# Patient Record
Sex: FEMALE | Race: WHITE | NOT HISPANIC OR LATINO | Employment: OTHER | URBAN - METROPOLITAN AREA
[De-identification: names, ages, dates, MRNs, and addresses within clinical notes are randomized per-mention and may not be internally consistent; named-entity substitution may affect disease eponyms.]

---

## 2017-01-09 ENCOUNTER — OFFICE VISIT (OUTPATIENT)
Dept: INTERNAL MEDICINE | Facility: CLINIC | Age: 82
End: 2017-01-09

## 2017-01-09 ENCOUNTER — APPOINTMENT (OUTPATIENT)
Dept: WOMENS IMAGING | Facility: HOSPITAL | Age: 82
End: 2017-01-09

## 2017-01-09 VITALS
HEIGHT: 57 IN | BODY MASS INDEX: 21.57 KG/M2 | SYSTOLIC BLOOD PRESSURE: 110 MMHG | HEART RATE: 80 BPM | DIASTOLIC BLOOD PRESSURE: 68 MMHG | OXYGEN SATURATION: 97 % | WEIGHT: 100 LBS | TEMPERATURE: 97.5 F

## 2017-01-09 DIAGNOSIS — J06.9 ACUTE URI: ICD-10-CM

## 2017-01-09 DIAGNOSIS — R04.2 HEMOPTYSIS: Primary | ICD-10-CM

## 2017-01-09 LAB
ALBUMIN SERPL-MCNC: 4.01 G/DL (ref 3.4–4.6)
ALBUMIN/GLOB SERPL: 1.1 G/DL
ALP SERPL-CCNC: 130 U/L (ref 46–116)
ALT SERPL W P-5'-P-CCNC: 16 U/L (ref 14–59)
ANION GAP SERPL CALCULATED.3IONS-SCNC: 9 MMOL/L
AST SERPL-CCNC: 22 U/L (ref 7–37)
BASOPHILS # BLD AUTO: 0.02 10*3/MM3 (ref 0–0.2)
BASOPHILS NFR BLD AUTO: 0.3 % (ref 0–2)
BILIRUB SERPL-MCNC: 0.4 MG/DL (ref 0.2–1)
BUN BLD-MCNC: 23 MG/DL (ref 6–22)
BUN/CREAT SERPL: 15 (ref 7–25)
CALCIUM SPEC-SCNC: 9.2 MG/DL (ref 8.6–10.5)
CHLORIDE SERPL-SCNC: 102 MMOL/L (ref 95–107)
CO2 SERPL-SCNC: 30 MMOL/L (ref 23–32)
CREAT BLD-MCNC: 1.53 MG/DL (ref 0.55–1.02)
DEPRECATED RDW RBC AUTO: 45.9 FL (ref 37–54)
EOSINOPHIL # BLD AUTO: 0.16 10*3/MM3 (ref 0–0.7)
EOSINOPHIL NFR BLD AUTO: 2.4 % (ref 0–5)
ERYTHROCYTE [DISTWIDTH] IN BLOOD BY AUTOMATED COUNT: 13.4 % (ref 11.5–15)
GFR SERPL CREATININE-BSD FRML MDRD: 33 ML/MIN/1.73
GLOBULIN UR ELPH-MCNC: 3.7 GM/DL
GLUCOSE BLD-MCNC: 93 MG/DL (ref 70–100)
HCT VFR BLD AUTO: 46.4 % (ref 34.1–44.9)
HGB BLD-MCNC: 14.6 G/DL (ref 11.2–15.7)
LYMPHOCYTES # BLD AUTO: 1.7 10*3/MM3 (ref 0.8–7)
LYMPHOCYTES NFR BLD AUTO: 26 % (ref 10–60)
MCH RBC QN AUTO: 29.9 PG (ref 26–34)
MCHC RBC AUTO-ENTMCNC: 31.5 G/DL (ref 31–37)
MCV RBC AUTO: 95.1 FL (ref 80–100)
MONOCYTES # BLD AUTO: 0.66 10*3/MM3 (ref 0–1)
MONOCYTES NFR BLD AUTO: 10.1 % (ref 0–13)
NEUTROPHILS # BLD AUTO: 4.01 10*3/MM3 (ref 1–11)
NEUTROPHILS NFR BLD AUTO: 61.2 % (ref 30–85)
PLATELET # BLD AUTO: 184 10*3/MM3 (ref 150–450)
PMV BLD AUTO: 8.9 FL (ref 6–12)
POTASSIUM BLD-SCNC: 4.5 MMOL/L (ref 3.3–5.3)
PROT SERPL-MCNC: 7.7 G/DL (ref 6.3–8.4)
RBC # BLD AUTO: 4.88 10*6/MM3 (ref 3.93–5.22)
SODIUM BLD-SCNC: 141 MMOL/L (ref 136–145)
WBC NRBC COR # BLD: 6.55 10*3/MM3 (ref 5–10)

## 2017-01-09 PROCEDURE — 99214 OFFICE O/P EST MOD 30 MIN: CPT | Performed by: NURSE PRACTITIONER

## 2017-01-09 PROCEDURE — 71020 CHG CHEST X-RAY 2 VW: CPT | Performed by: RADIOLOGY

## 2017-01-09 PROCEDURE — 85025 COMPLETE CBC W/AUTO DIFF WBC: CPT | Performed by: NURSE PRACTITIONER

## 2017-01-09 PROCEDURE — 71020 XR CHEST 2 VW: CPT | Performed by: NURSE PRACTITIONER

## 2017-01-09 PROCEDURE — 80053 COMPREHEN METABOLIC PANEL: CPT | Performed by: NURSE PRACTITIONER

## 2017-01-09 RX ORDER — AZITHROMYCIN 250 MG/1
TABLET, FILM COATED ORAL
Qty: 6 TABLET | Refills: 0 | Status: SHIPPED | OUTPATIENT
Start: 2017-01-09 | End: 2017-04-26

## 2017-01-09 NOTE — MR AVS SNAPSHOT
Marilynn Gil   1/9/2017 11:15 AM   Office Visit    Dept Phone:  [phone]   Encounter #:  [id number]    Provider:  NATIVIDAD Spears   Department:  [facility] INTERNAL MEDICINE                Your Full Care Plan              Today's Medication Changes          These changes are accurate as of: 1/9/17 12:35 PM.  If you have any questions, ask your nurse or doctor.               New Medication(s)Ordered:     azithromycin 250 MG tablet   Commonly known as:  ZITHROMAX Z-HUSAM   Take 2 tablets the first day, then 1 tablet daily for 4 days.   Started by:  NATIVIDAD Spears         Stop taking medication(s)listed here:     sucralfate 1 G tablet   Commonly known as:  CARAFATE   Stopped by:  NATIVIDAD Spears           vitamin D 36887 UNITS capsule capsule   Commonly known as:  ERGOCALCIFEROL   Stopped by:  NATIVIDAD Spears                Where to Get Your Medications      These medications were sent to [facility] [address] [place] [address] [place]  AT [facility]) &  - [phone] [facility] [phone] [address] [address]     Phone:  [phone]     azithromycin 250 MG tablet                  Your Updated Medication List          This list is accurate as of: 1/9/17 12:35 PM.  Always use your most recent med list.                aspirin 325 MG tablet       azithromycin 250 MG tablet   Commonly known as:  ZITHROMAX Z-HUSAM   Take 2 tablets the first day, then 1 tablet daily for 4 days.       diphenoxylate-atropine 2.5-0.025 MG per tablet   Commonly known as:  LOMOTIL       furosemide 20 MG tablet   Commonly known as:  LASIX       isosorbide mononitrate 60 MG 24 hr tablet   Commonly known as:  IMDUR       lisinopril 5 MG tablet   Commonly known as:  PRINIVIL,ZESTRIL       meclizine 25 MG chewable tablet chewable tablet       megestrol 40 MG/ML suspension   Commonly known as:  MEGACE       metoprolol tartrate 25 MG tablet   Commonly known as:  LOPRESSOR       NITROSTAT 0.4 MG SL tablet   Generic drug:  nitroglycerin       ondansetron 4 MG tablet   Commonly known as:  ZOFRAN       pantoprazole 40 MG EC tablet   Commonly known as:  PROTONIX       pentoxifylline 400 MG CR tablet   Commonly known as:  TRENtal       pilocarpine 5 MG tablet   Commonly known as:  SALAGEN       potassium chloride 10 MEQ CR tablet   Commonly known as:  K-DUR,KLOR-CON       prasugrel 5 MG tablet   Commonly known as:  EFFIENT       simvastatin 10 MG tablet   Commonly known as:  ZOCOR       zolpidem 10 MG tablet   Commonly known as:  AMBIEN   TAKE 1/2 TO 1 TABLET BY MOUTH EVERY NIGHT AT BEDTIME               We Performed the Following     XR Chest 2 View       You Were Diagnosed With        Codes Comments    Hemoptysis    -  Primary ICD-10-CM: R04.2  ICD-9-CM: 786.30     Acute URI     ICD-10-CM: J06.9  ICD-9-CM: 465.9       Instructions     None    Patient Instructions History      Upcoming Appointments     Visit Type Date Time Department    OFFICE VISIT 1/9/2017 11:15 AM [employer]    FOLLOW UP 4/26/2017  9:30 AM [employer]     Our records indicate that you have declined [facility] signup. If you would like to sign up for [employer], please email [email or web address] or call [phone] to obtain an activation code.             Other Info from Your Visit           Your Appointments     Apr 26, 2017  9:30 AM EDT   Follow Up with Joycelyn Zapata MD   [facility] (--)    [address] [place] [id number] [phone]           Arrive 15 minutes prior to appointment.              Allergies     Codeine      Other      Darvocet    Shellfish-derived Products      Sulfa Antibiotics        Reason for Visit     URI coughing up blood    Cough           Vital Signs     Blood Pressure Pulse Temperature Height Weight Oxygen Saturation    110/68  "(BP Location: Left arm, Patient Position: Sitting, Cuff Size: Adult) 80 97.5 °F (36.4 °C) (Oral) 57\" (144.8 cm) 100 lb (45.4 kg) 97%    Body Mass Index Smoking Status                21.64 kg/m2 Former Smoker          Problems and Diagnoses Noted     Coughing blood    -  Primary    Acute upper respiratory infection          Results     XR Chest 2 View               "

## 2017-01-10 NOTE — PROGRESS NOTES
Subjective   Marilynn Gil is a 81 y.o. female who presents due to hemoptysis.    HPI Comments: She c/o a mild headache last week with sneezing and nasal itching, sx have since resolved. However, she c/o a cough which has been productive of sputum. She noticed bright red blood Friday evening with coughing which has lingered since then. Denies shortness of breath. She denies abdominal pain, no rectal bleeding. She currently takes Effient and ASA due to hx of venous thrombosis.    URI    This is a new problem. The current episode started in the past 7 days. The problem has been gradually improving. There has been no fever. Associated symptoms include congestion, coughing and rhinorrhea. Pertinent negatives include no abdominal pain, chest pain, dysuria, ear pain, headaches, nausea, sore throat, vomiting or wheezing.   Cough   This is a new problem. The current episode started in the past 7 days. The cough is productive of bloody sputum. Associated symptoms include rhinorrhea. Pertinent negatives include no chest pain, chills, ear pain, fever, headaches, postnasal drip, sore throat or wheezing.        The following portions of the patient's history were reviewed and updated as appropriate: allergies, current medications, past social history and problem list.    Past Medical History   Diagnosis Date   • Anxiety    • CAD in native artery    • Chronic kidney disease, stage 3    • GERD (gastroesophageal reflux disease)    • Hypercholesterolemia    • Hypertension    • Insomnia    • Mesenteric ischemia, chronic    • Osteoporosis    • Pancreatitis    • Venous thrombosis and embolism          Current Outpatient Prescriptions:   •  aspirin 325 MG tablet, Take 325 mg by mouth daily., Disp: , Rfl:   •  diphenoxylate-atropine (LOMOTIL) 2.5-0.025 MG per tablet, Take 1 tablet by mouth. 1-2 tablet every six hours prn diarrhea, Disp: , Rfl:   •  furosemide (LASIX) 20 MG tablet, 1 tablet daily., Disp: , Rfl: 3  •  isosorbide  mononitrate (IMDUR) 60 MG 24 hr tablet, 1 tablet daily., Disp: , Rfl: 3  •  lisinopril (PRINIVIL,ZESTRIL) 5 MG tablet, Take 5 mg by mouth daily., Disp: , Rfl:   •  meclizine 25 MG chewable tablet chewable tablet, Chew 25 mg. 1 tablet every 6-8 hours PRN, Disp: , Rfl:   •  megestrol (MEGACE) 40 MG/ML suspension, TK 10 ML PO QHS, Disp: , Rfl: 3  •  metoprolol tartrate (LOPRESSOR) 25 MG tablet, 1/2 tablet two times daily, Disp: , Rfl: 3  •  NITROSTAT 0.4 MG SL tablet, 1 tab sublingual every 5 minutes x 3 as needed for chest pressure, Disp: , Rfl: 0  •  ondansetron (ZOFRAN) 4 MG tablet, Take 4 mg by mouth. 1 tablet every 4-6 hours as needed for nausea, Disp: , Rfl:   •  pantoprazole (PROTONIX) 40 MG EC tablet, 1 tablet daily., Disp: , Rfl:   •  pentoxifylline (TRENTal) 400 MG CR tablet, 1 tablet 3 (three) times a day., Disp: , Rfl: 3  •  pilocarpine (SALAGEN) 5 MG tablet, TK 1 T PO  TID, Disp: , Rfl: 1  •  potassium chloride (K-DUR,KLOR-CON) 10 MEQ CR tablet, 1 tablet daily., Disp: , Rfl: 4  •  prasugrel (EFFIENT) tablet, Take 5 mg by mouth daily., Disp: , Rfl:   •  simvastatin (ZOCOR) 10 MG tablet, TK 1 T PO D UTD, Disp: , Rfl: 3  •  zolpidem (AMBIEN) 10 MG tablet, TAKE 1/2 TO 1 TABLET BY MOUTH EVERY NIGHT AT BEDTIME, Disp: 90 tablet, Rfl: 0  •  azithromycin (ZITHROMAX Z-HUSAM) 250 MG tablet, Take 2 tablets the first day, then 1 tablet daily for 4 days., Disp: 6 tablet, Rfl: 0    Allergies   Allergen Reactions   • Codeine    • Other      Darvocet   • Shellfish-Derived Products    • Sulfa Antibiotics        Review of Systems   Constitutional: Negative for chills, fatigue, fever and unexpected weight change.   HENT: Positive for congestion and rhinorrhea. Negative for ear pain, postnasal drip, sinus pressure, sore throat and trouble swallowing.    Eyes: Negative for visual disturbance.   Respiratory: Positive for cough. Negative for chest tightness and wheezing.    Cardiovascular: Negative for chest pain, palpitations and  "leg swelling.   Gastrointestinal: Negative for abdominal pain, blood in stool, nausea and vomiting.   Endocrine: Negative for cold intolerance and heat intolerance.   Genitourinary: Negative for dysuria, frequency and urgency.   Musculoskeletal: Negative for arthralgias and joint swelling.   Skin: Negative for color change.   Allergic/Immunologic: Negative for immunocompromised state.   Neurological: Negative for syncope, weakness and headaches.   Hematological: Does not bruise/bleed easily.       Objective   Vitals:    01/09/17 1132   BP: 110/68   BP Location: Left arm   Patient Position: Sitting   Cuff Size: Adult   Pulse: 80   Temp: 97.5 °F (36.4 °C)   TempSrc: Oral   SpO2: 97%   Weight: 100 lb (45.4 kg)   Height: 57\" (144.8 cm)     Physical Exam   Constitutional: She is oriented to person, place, and time. She appears well-developed and well-nourished. No distress.   HENT:   Head: Normocephalic and atraumatic.   Right Ear: External ear normal.   Left Ear: External ear normal.   Eyes: Conjunctivae and EOM are normal. Pupils are equal, round, and reactive to light. No scleral icterus.   Neck: Normal range of motion. Neck supple. No thyromegaly present.   Cardiovascular: Normal rate, regular rhythm, normal heart sounds and intact distal pulses.  Exam reveals no gallop and no friction rub.    No murmur heard.  Pulmonary/Chest: Effort normal and breath sounds normal. No respiratory distress.   Abdominal: Soft. There is no tenderness.   Lymphadenopathy:     She has no cervical adenopathy.   Neurological: She is alert and oriented to person, place, and time.   Skin: Skin is warm and dry. No rash noted. No erythema.   Psychiatric: She has a normal mood and affect. Her behavior is normal.   Nursing note and vitals reviewed.      Assessment/Plan   Marilynn was seen today for uri and cough.    Diagnoses and all orders for this visit:    Hemoptysis  Comments:  no acute abnl on CXR (bronchial thickening present on CXR in " 2014)-will send for review  Orders:  -     XR Chest 2 View  -     CBC Auto Differential; Future  -     Comprehensive Metabolic Panel; Future  -     CBC Auto Differential  -     Comprehensive Metabolic Panel    Acute URI  Comments:  will treat for URI but also send for CT scan to re-evaluate lungs  Orders:  -     azithromycin (ZITHROMAX Z-HUSAM) 250 MG tablet; Take 2 tablets the first day, then 1 tablet daily for 4 days.

## 2017-01-11 ENCOUNTER — TELEPHONE (OUTPATIENT)
Dept: INTERNAL MEDICINE | Facility: CLINIC | Age: 82
End: 2017-01-11

## 2017-01-11 DIAGNOSIS — R04.2 HEMOPTYSIS: Primary | ICD-10-CM

## 2017-01-11 NOTE — TELEPHONE ENCOUNTER
----- Message from Loree Hernandez sent at 1/11/2017  1:39 PM EST -----  Contact: Pt  I called to notify the pt of her CT scan scheduled for 1/12/17 @ 4:30 pm.  Pt was unaware of order, but will try to make appt and understood need for CT.  However, pt is still feeling very poorly and coughing a great deal.  No longer coughing up blood, but is coughing up a lot of phlegm and generally feeling bad.  Pt said no new symptoms, but no better.    Pt would like to speak to Leida if she could please return her call today prior to her CT tomorrow.      Thanks    Pt's # [phone]

## 2017-01-11 NOTE — TELEPHONE ENCOUNTER
----- Message from Meena Philippe MA sent at 1/11/2017 12:23 PM EST -----  Pt calling for lab and xray results.    Pt#[phone]

## 2017-01-12 ENCOUNTER — HOSPITAL ENCOUNTER (OUTPATIENT)
Dept: CT IMAGING | Facility: HOSPITAL | Age: 82
Discharge: HOME | End: 2017-01-12
Admitting: NURSE PRACTITIONER

## 2017-01-12 DIAGNOSIS — R04.2 HEMOPTYSIS: ICD-10-CM

## 2017-01-12 LAB — CREAT BLDA-MCNC: 1.6 MG/DL (ref 0.6–1.3)

## 2017-01-12 PROCEDURE — 71250 CT THORAX DX C-: CPT

## 2017-01-12 PROCEDURE — 82565 ASSAY OF CREATININE: CPT

## 2017-01-12 NOTE — TELEPHONE ENCOUNTER
Discussed with patient and discussed radiologist CXR report. She is no longer coughing up blood but c/o worsening chest congestion and cough. Scheduled CT of chest tomorrow, will follow results.

## 2017-02-28 DIAGNOSIS — G47.00 INSOMNIA: ICD-10-CM

## 2017-03-01 DIAGNOSIS — G47.00 INSOMNIA: ICD-10-CM

## 2017-03-01 RX ORDER — ZOLPIDEM TARTRATE 10 MG/1
TABLET ORAL
Qty: 90 TABLET | Refills: 0 | OUTPATIENT
Start: 2017-03-01 | End: 2017-05-24 | Stop reason: SDUPTHER

## 2017-03-02 ENCOUNTER — TELEPHONE (OUTPATIENT)
Dept: INTERNAL MEDICINE | Facility: CLINIC | Age: 82
End: 2017-03-02

## 2017-03-02 RX ORDER — POTASSIUM CHLORIDE 750 MG/1
TABLET, EXTENDED RELEASE ORAL
Qty: 90 TABLET | Refills: 1 | Status: SHIPPED | OUTPATIENT
Start: 2017-03-02 | End: 2017-08-28 | Stop reason: SDUPTHER

## 2017-03-02 RX ORDER — ZOLPIDEM TARTRATE 10 MG/1
TABLET ORAL
Qty: 90 TABLET | Refills: 0 | OUTPATIENT
Start: 2017-03-02

## 2017-03-02 NOTE — TELEPHONE ENCOUNTER
----- Message from Leida Barajas sent at 3/2/2017  9:25 AM EST -----  Pt calling for a refill on her potassium chloride (K-DUR,KLOR-CON) 10 MEQ CR tablet    Please send to [facility] [id number] [place] [address] [phone] AT [facility]([place]) &  - [phone] [facility] [phone] FX

## 2017-03-02 NOTE — TELEPHONE ENCOUNTER
Refill for potassium requested by patient sent to pharmacy. Receipt of confirmation of receiving the Rx for potassium confirmed by pharmacy

## 2017-04-26 ENCOUNTER — OFFICE VISIT (OUTPATIENT)
Dept: INTERNAL MEDICINE | Facility: CLINIC | Age: 82
End: 2017-04-26

## 2017-04-26 VITALS
BODY MASS INDEX: 21.57 KG/M2 | DIASTOLIC BLOOD PRESSURE: 70 MMHG | SYSTOLIC BLOOD PRESSURE: 114 MMHG | WEIGHT: 100 LBS | HEIGHT: 57 IN | RESPIRATION RATE: 14 BRPM

## 2017-04-26 DIAGNOSIS — G89.29 OTHER CHRONIC BACK PAIN: ICD-10-CM

## 2017-04-26 DIAGNOSIS — R20.2 PARESTHESIA OF RIGHT LEG: ICD-10-CM

## 2017-04-26 DIAGNOSIS — I10 ESSENTIAL HYPERTENSION: Primary | ICD-10-CM

## 2017-04-26 DIAGNOSIS — M54.9 OTHER CHRONIC BACK PAIN: ICD-10-CM

## 2017-04-26 DIAGNOSIS — E78.00 HYPERCHOLESTEROLEMIA: ICD-10-CM

## 2017-04-26 DIAGNOSIS — K86.1 CHRONIC RECURRENT PANCREATITIS (HCC): ICD-10-CM

## 2017-04-26 LAB
ALBUMIN SERPL-MCNC: 4.12 G/DL (ref 3.4–4.6)
ALBUMIN/GLOB SERPL: 1.2 G/DL
ALP SERPL-CCNC: 145 U/L (ref 46–116)
ALT SERPL W P-5'-P-CCNC: 20 U/L (ref 14–59)
AMYLASE SERPL-CCNC: 117 U/L (ref 28–100)
ANION GAP SERPL CALCULATED.3IONS-SCNC: 11 MMOL/L
AST SERPL-CCNC: 26 U/L (ref 7–37)
BILIRUB SERPL-MCNC: 0.4 MG/DL (ref 0.2–1)
BUN BLD-MCNC: 35 MG/DL (ref 6–22)
BUN/CREAT SERPL: 22.2 (ref 7–25)
CALCIUM SPEC-SCNC: 9.4 MG/DL (ref 8.6–10.5)
CHLORIDE SERPL-SCNC: 103 MMOL/L (ref 95–107)
CHOLEST SERPL-MCNC: 255 MG/DL (ref 0–200)
CO2 SERPL-SCNC: 28 MMOL/L (ref 23–32)
CREAT BLD-MCNC: 1.58 MG/DL (ref 0.55–1.02)
GFR SERPL CREATININE-BSD FRML MDRD: 31 ML/MIN/1.73
GLOBULIN UR ELPH-MCNC: 3.4 GM/DL
GLUCOSE BLD-MCNC: 97 MG/DL (ref 70–100)
HDLC SERPL-MCNC: 49 MG/DL (ref 40–81)
LDLC SERPL CALC-MCNC: 146 MG/DL (ref 0–100)
LDLC/HDLC SERPL: 2.98 {RATIO}
LIPASE SERPL-CCNC: 48 U/L (ref 13–60)
POTASSIUM BLD-SCNC: 4.7 MMOL/L (ref 3.3–5.3)
PROT SERPL-MCNC: 7.5 G/DL (ref 6.3–8.4)
SODIUM BLD-SCNC: 142 MMOL/L (ref 136–145)
TRIGL SERPL-MCNC: 299 MG/DL (ref 0–150)
VLDLC SERPL-MCNC: 59.8 MG/DL

## 2017-04-26 PROCEDURE — 99214 OFFICE O/P EST MOD 30 MIN: CPT | Performed by: INTERNAL MEDICINE

## 2017-04-26 PROCEDURE — 80053 COMPREHEN METABOLIC PANEL: CPT | Performed by: INTERNAL MEDICINE

## 2017-04-26 PROCEDURE — 80061 LIPID PANEL: CPT | Performed by: INTERNAL MEDICINE

## 2017-04-26 NOTE — PROGRESS NOTES
Chief Complaint   Patient presents with   • Hypertension     4 month check up    • Hyperlipidemia        Subjective   Marilynn Gil is a 82 y.o. female     HPI: Hypertension:  No management changes were made at her last appointment.     She has not had problems with headaches, visual disturbance.  Recent blood pressures have been controlled.  She has not had associated symptoms of chest pain, syncope, edema, orthopnea, GALINDO, PND.   She is currently taking an ACE-I, BB.  Prudent diet and regular exercise have also been recommended. By report, there is good compliance and good tolerance of medication.     Hyperlipidemia:  No management changes were made at her last appointment. Her most recent lipid panel was done on 12/15/2016  Total cholesterol was 267, Triglycerides 305, HDL 55, .  She is currently taking a statin.  Prudent diet and regular exercise have also been recommended. By report, there is good compliance with treatment and fair tolerance.  She has leg cramps with simvastatin.       Leg numbness is worse - notices it after sitting, has almost fallen. Right leg.  Feels weaker. Foot especially is numb. She had nerve conduction test done last year.  She had flare up of abdominal symptoms recently with loose bowel movements    Dizziness:  Notices this when she gets up from sitting position, feesl faint and dizzy. No spinning. Sometimes feels dizzy when she is walking.     She has noticed some sharp left low back pain at times. This has been going on for several months. No change.     Her gastroenterologist will just be doing hospital work.  Her next appointment will be with a physician assistant.             The following portions of the patient's history were reviewed and updated as appropriate: allergies, current medications, past social history, problem list, past surgical history    Review of Systems   Constitutional: Negative for activity change and appetite change.   HENT: Negative for  "nosebleeds.    Eyes: Negative for visual disturbance.   Respiratory: Negative for cough and shortness of breath.    Cardiovascular: Negative for chest pain, palpitations and leg swelling.   Gastrointestinal: Positive for abdominal pain.   Neurological: Negative for headaches.   Psychiatric/Behavioral: Negative for sleep disturbance.       /70 (BP Location: Right arm, Patient Position: Sitting, Cuff Size: Adult)  Resp 14  Ht 57\" (144.8 cm)  Wt 100 lb (45.4 kg)  BMI 21.64 kg/m2     Objective   Physical Exam   Constitutional: She is oriented to person, place, and time. She appears well-developed and well-nourished. No distress.   Neck: Normal carotid pulses present. Carotid bruit is not present.   Cardiovascular: Normal rate, regular rhythm, S1 normal, S2 normal and intact distal pulses.  Exam reveals no gallop and no friction rub.    No murmur heard.  Pulses:       Carotid pulses are 2+ on the right side, and 2+ on the left side.  Pulmonary/Chest: Effort normal and breath sounds normal. No respiratory distress. She has no wheezes. She has no rhonchi. She has no rales. Chest wall is not dull to percussion.   Musculoskeletal: She exhibits no edema.   Neurological: She is alert and oriented to person, place, and time.   Right leg weaker than left   Skin: Skin is warm and dry.   Nursing note and vitals reviewed.      Assessment/Plan   Problem List Items Addressed This Visit        Cardiovascular and Mediastinum    Hypertension - Primary    Relevant Orders    Comprehensive Metabolic Panel    Lipid Panel    Hypercholesterolemia       Other    Chronic recurrent pancreatitis    Relevant Orders    Amylase    Lipase      Other Visit Diagnoses     Paresthesia of right leg        Relevant Orders    Ambulatory Referral to Physical Therapy Evaluate and treat    Other chronic back pain        Relevant Orders    Ambulatory Referral to Physical Therapy Evaluate and treat        Will try PT for back pain and leg numbness. She " has had several back surgeries in the past.

## 2017-05-04 ENCOUNTER — HOSPITAL ENCOUNTER (OUTPATIENT)
Dept: PHYSICAL THERAPY | Facility: HOSPITAL | Age: 82
Setting detail: THERAPIES SERIES
Discharge: HOME | End: 2017-05-04
Attending: INTERNAL MEDICINE

## 2017-05-04 DIAGNOSIS — R53.1 WEAKNESS GENERALIZED: ICD-10-CM

## 2017-05-04 DIAGNOSIS — R20.0 RIGHT LEG NUMBNESS: Primary | ICD-10-CM

## 2017-05-04 PROCEDURE — G8978 MOBILITY CURRENT STATUS: HCPCS | Performed by: PHYSICAL THERAPIST

## 2017-05-04 PROCEDURE — 97110 THERAPEUTIC EXERCISES: CPT | Performed by: PHYSICAL THERAPIST

## 2017-05-04 PROCEDURE — 97161 PT EVAL LOW COMPLEX 20 MIN: CPT | Performed by: PHYSICAL THERAPIST

## 2017-05-04 PROCEDURE — G8979 MOBILITY GOAL STATUS: HCPCS | Performed by: PHYSICAL THERAPIST

## 2017-05-08 ENCOUNTER — APPOINTMENT (OUTPATIENT)
Dept: PHYSICAL THERAPY | Facility: HOSPITAL | Age: 82
End: 2017-05-08
Attending: INTERNAL MEDICINE

## 2017-05-19 ENCOUNTER — DOCUMENTATION (OUTPATIENT)
Dept: PHYSICAL THERAPY | Facility: HOSPITAL | Age: 82
End: 2017-05-19

## 2017-05-19 DIAGNOSIS — R20.0 RIGHT LEG NUMBNESS: Primary | ICD-10-CM

## 2017-05-19 DIAGNOSIS — R53.1 WEAKNESS GENERALIZED: ICD-10-CM

## 2017-05-19 PROCEDURE — G8979 MOBILITY GOAL STATUS: HCPCS | Performed by: PHYSICAL THERAPIST

## 2017-05-19 PROCEDURE — G8980 MOBILITY D/C STATUS: HCPCS | Performed by: PHYSICAL THERAPIST

## 2017-05-24 DIAGNOSIS — G47.00 INSOMNIA: ICD-10-CM

## 2017-05-24 RX ORDER — ZOLPIDEM TARTRATE 10 MG/1
TABLET ORAL
Qty: 90 TABLET | Refills: 1 | OUTPATIENT
Start: 2017-05-24 | End: 2017-11-19 | Stop reason: SDUPTHER

## 2017-07-07 ENCOUNTER — DOCUMENTATION (OUTPATIENT)
Dept: PHYSICAL THERAPY | Facility: HOSPITAL | Age: 82
End: 2017-07-07

## 2017-08-28 RX ORDER — POTASSIUM CHLORIDE 750 MG/1
TABLET, EXTENDED RELEASE ORAL
Qty: 90 TABLET | Refills: 0 | Status: SHIPPED | OUTPATIENT
Start: 2017-08-28 | End: 2017-11-24 | Stop reason: SDUPTHER

## 2017-09-01 ENCOUNTER — OFFICE VISIT (OUTPATIENT)
Dept: INTERNAL MEDICINE | Facility: CLINIC | Age: 82
End: 2017-09-01

## 2017-09-01 VITALS
BODY MASS INDEX: 21.57 KG/M2 | WEIGHT: 100 LBS | SYSTOLIC BLOOD PRESSURE: 130 MMHG | DIASTOLIC BLOOD PRESSURE: 78 MMHG | HEIGHT: 57 IN

## 2017-09-01 DIAGNOSIS — K86.1 CHRONIC RECURRENT PANCREATITIS (HCC): ICD-10-CM

## 2017-09-01 DIAGNOSIS — I10 ESSENTIAL HYPERTENSION: Primary | ICD-10-CM

## 2017-09-01 DIAGNOSIS — G47.00 INSOMNIA, UNSPECIFIED TYPE: ICD-10-CM

## 2017-09-01 DIAGNOSIS — E78.00 HYPERCHOLESTEROLEMIA: ICD-10-CM

## 2017-09-01 LAB
ALBUMIN SERPL-MCNC: 3.76 G/DL (ref 3.4–4.6)
ALBUMIN/GLOB SERPL: 1 G/DL
ALP SERPL-CCNC: 132 U/L (ref 46–116)
ALT SERPL W P-5'-P-CCNC: 22 U/L (ref 14–59)
AMYLASE SERPL-CCNC: 114 U/L (ref 28–100)
ANION GAP SERPL CALCULATED.3IONS-SCNC: 7 MMOL/L
AST SERPL-CCNC: 24 U/L (ref 7–37)
BILIRUB SERPL-MCNC: 0.4 MG/DL (ref 0.2–1)
BUN BLD-MCNC: 32 MG/DL (ref 6–22)
BUN/CREAT SERPL: 18.8 (ref 7–25)
CALCIUM SPEC-SCNC: 9.5 MG/DL (ref 8.6–10.5)
CHLORIDE SERPL-SCNC: 104 MMOL/L (ref 95–107)
CHOLEST SERPL-MCNC: 224 MG/DL (ref 0–200)
CO2 SERPL-SCNC: 29 MMOL/L (ref 23–32)
CREAT BLD-MCNC: 1.7 MG/DL (ref 0.55–1.02)
GFR SERPL CREATININE-BSD FRML MDRD: 29 ML/MIN/1.73
GLOBULIN UR ELPH-MCNC: 3.9 GM/DL
GLUCOSE BLD-MCNC: 113 MG/DL (ref 70–100)
HDLC SERPL-MCNC: 47 MG/DL (ref 40–81)
LDLC SERPL CALC-MCNC: 133 MG/DL (ref 0–100)
LDLC/HDLC SERPL: 2.83 {RATIO}
LIPASE SERPL-CCNC: 58 U/L (ref 13–60)
POTASSIUM BLD-SCNC: 4.3 MMOL/L (ref 3.3–5.3)
PROT SERPL-MCNC: 7.7 G/DL (ref 6.3–8.4)
SODIUM BLD-SCNC: 140 MMOL/L (ref 136–145)
TRIGL SERPL-MCNC: 221 MG/DL (ref 0–150)
VLDLC SERPL-MCNC: 44.2 MG/DL

## 2017-09-01 PROCEDURE — 36415 COLL VENOUS BLD VENIPUNCTURE: CPT | Performed by: INTERNAL MEDICINE

## 2017-09-01 PROCEDURE — 99213 OFFICE O/P EST LOW 20 MIN: CPT | Performed by: INTERNAL MEDICINE

## 2017-09-01 PROCEDURE — 80061 LIPID PANEL: CPT | Performed by: INTERNAL MEDICINE

## 2017-09-01 PROCEDURE — 80053 COMPREHEN METABOLIC PANEL: CPT | Performed by: INTERNAL MEDICINE

## 2017-09-01 NOTE — PROGRESS NOTES
Chief Complaint   Patient presents with   • Hypertension     4 month follow up   • Hyperlipidemia        Subjective   Marilynn Gil is a 82 y.o. female with history of coronary artery disease, chronic kidney disease, chronic pancreatitis, hypertension and hyperlipidemia.  She has had recent follow-up with her cardiologist.  She would like referral to [facility] gastroenterologist.       HPI: Hypertension: This is a chronic problem.   No management changes were made at her last appointment.       She has not had problems with headaches, visual disturbance, dizziness.  Recent blood pressures have been controlled.    She has chest pain from time to time.   No dizziness.  Current treatment: ACE inhibitor, beta blocker, diuretic  Prudent diet and regular exercise have also been recommended. By report, there is good compliance and good tolerance of medication.  Medical history is significant for coronary artery disease, history of MI, hyperlipidemia.    Hyperlipidemia: Recent lipid panel results reviewed.  Despite statin, she does continue to have high cholesterol.  She has been unable to tolerate high doses of statins.  She suffers from myalgias with high doses.  She is trying to follow a prudent diet.      Chronic pancreatitis:  She has ongoing problems with epigastric discomfort.  This affects her appetite.  She denies nausea or vomiting at this current time.  She has ongoing problems with diarrhea.  She has history of rectal prolapse and has had several repairs.    Insomnia: She does not sleep well.  She takes zolpidem which helps a little bit.      The following portions of the patient's history were reviewed and updated as appropriate: allergies, current medications, past social history, problem list, past surgical history    Review of Systems   Constitutional: Positive for fatigue. Negative for activity change and appetite change (appetite is fair, no change).   HENT: Positive for trouble swallowing. Negative for  "nosebleeds.         Recently had a lesion removed from upper gum on the right.   Eyes: Positive for visual disturbance (has macular degeneration ARED eye vitamin recommended).   Respiratory: Negative for cough and shortness of breath.    Cardiovascular: Positive for chest pain (some, not \"terrible\", has had recent cardilogy follow up) and leg swelling (a little in the ankles sometimes, lasix helps). Negative for palpitations.   Gastrointestinal: Positive for abdominal pain and diarrhea.   Neurological: Negative for headaches.   Psychiatric/Behavioral: Positive for sleep disturbance (does not sleep well.  she takes zolpidem and benadry.).           Objective     /78  Ht 57\" (144.8 cm)  Wt 100 lb (45.4 kg)  BMI 21.64 kg/m2     Physical Exam   Constitutional: She is oriented to person, place, and time. She appears well-developed and well-nourished. No distress.   Neck: Normal carotid pulses present. Carotid bruit is not present.   Cardiovascular: Normal rate, regular rhythm, S1 normal, S2 normal and intact distal pulses.  Exam reveals no gallop and no friction rub.    No murmur heard.  Pulses:       Carotid pulses are 2+ on the right side, and 2+ on the left side.  Pulmonary/Chest: Effort normal and breath sounds normal. No respiratory distress. She has no wheezes. She has no rhonchi. She has no rales. Chest wall is not dull to percussion.   Musculoskeletal: She exhibits no edema.   Neurological: She is alert and oriented to person, place, and time.   Skin: Skin is warm and dry.   Nursing note and vitals reviewed.      Assessment/Plan   Problem List Items Addressed This Visit        Cardiovascular and Mediastinum    Hypertension - Primary    Relevant Orders    Comprehensive Metabolic Panel (Completed)    Lipid Panel (Completed)    Hypercholesterolemia       Other    Chronic recurrent pancreatitis    Relevant Orders    Amylase    Lipase    Ambulatory Referral to Gastroenterology    Insomnia            "

## 2017-10-11 ENCOUNTER — OFFICE VISIT (OUTPATIENT)
Dept: GASTROENTEROLOGY | Facility: CLINIC | Age: 82
End: 2017-10-11

## 2017-10-11 VITALS
HEIGHT: 57 IN | SYSTOLIC BLOOD PRESSURE: 112 MMHG | BODY MASS INDEX: 21.18 KG/M2 | TEMPERATURE: 98.3 F | DIASTOLIC BLOOD PRESSURE: 70 MMHG | WEIGHT: 98.2 LBS

## 2017-10-11 DIAGNOSIS — K21.9 GASTROESOPHAGEAL REFLUX DISEASE, ESOPHAGITIS PRESENCE NOT SPECIFIED: ICD-10-CM

## 2017-10-11 DIAGNOSIS — R13.10 DYSPHAGIA, UNSPECIFIED TYPE: Primary | ICD-10-CM

## 2017-10-11 DIAGNOSIS — K86.1 IDIOPATHIC CHRONIC PANCREATITIS (HCC): ICD-10-CM

## 2017-10-11 PROCEDURE — 99204 OFFICE O/P NEW MOD 45 MIN: CPT | Performed by: INTERNAL MEDICINE

## 2017-10-11 RX ORDER — ONDANSETRON 4 MG/1
4 TABLET, FILM COATED ORAL EVERY 8 HOURS PRN
Qty: 30 TABLET | Refills: 5 | Status: SHIPPED | OUTPATIENT
Start: 2017-10-11 | End: 2021-06-11

## 2017-10-11 RX ORDER — RANITIDINE 300 MG/1
300 TABLET ORAL NIGHTLY
Qty: 30 TABLET | Refills: 5 | Status: SHIPPED | OUTPATIENT
Start: 2017-10-11 | End: 2019-06-14

## 2017-10-11 NOTE — PROGRESS NOTES
Chief Complaint   Patient presents with   • Pancreatitis     Marilynn Gil is a 82 y.o. female who presents with Chronic pancreatitis.  Marilynn had followed with me in the past and then I had referred her on for additional consultation with Dr. Yogesh Boone.  She underwent an ERCP in 2013 with sphincterotomy.  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  She had continued symptoms and intermittent elevations in her amylase and lipase resulting in a second ERCP during a hospital admission in 2014.  At that time she had strictures noted in her distal pancreatic duct.  She was stented at that time.  She had evidence of chronic pancreatitis noted.  She has done better overall but continues to have fairly chronic episodes of epigastric pain.  She has felt better since she's been on pantoprazole once a day.  This was started by Dr. Edgard louise.  She does use Zofran when necessary.  She has had a couple of episodes were she's awoken in middle of the night and vomited.  She's an acid taste in her mouth.  She does not vomit old food.  She does struggle with gas and occasional loose stools.  She was tried on Creon in the past but she didn't really notice the benefit from that.  She has had trouble with her swallowing for some time.  She has a history of prior stroke.  She underwent a video swallow in 2016 at [facility] which showed some mild oropharyngeal dysfunction but a lot of esophageal back flow suggestive of esophageal dysmotility.  She has had esophageal dilation in the past which she did not report afforded her any significant improvement in her symptoms.  She has trouble with both solids and liquids and as a result is having difficulty gaining weight.   HPI  Past Medical History:   Diagnosis Date   • Anxiety    • CAD in native artery    • Chronic kidney disease, stage 3    • GERD (gastroesophageal reflux disease)    • Hypercholesterolemia    • Hypertension    • Insomnia    • Mesenteric ischemia, chronic    • Osteoporosis     • Pancreatitis    • Venous thrombosis and embolism      Past Surgical History:   Procedure Laterality Date   • BACK SURGERY      x 4   • BREAST MASS EXCISION      Benign   • CATARACT EXTRACTION Bilateral    • CHOLECYSTECTOMY  08/2013   • COLONOSCOPY  12/2012    scarred mucosa,internal hemorrhoids   • COLONOSCOPY  09/12/2012    Patent side to side ileo colonic anastomosis, one 7mm polyp in the ascending colon, erythematous and vascular pattern decreased mucosa in the transverse colon, scarred mucosa in the rectum, NBIH.  PATH: Tubular adenoma, Patchy mild chronic inflammation    • CORONARY ANGIOPLASTY      MI 1985, s/p angioplasty 1987   • ENDOSCOPY  12/06/2013    z line irreg 38cm from the incisors, tortuous esophagus, HH, gastritis, bilious gastric fluid, duodenitis.  PATH: Gastric mucosa with minimal chornic infalmmation and with histologic features suggestive of reactive gastropathy.    • PANCREAS SURGERY  05/2013    Pancreatic duct stricture, stent placed   • RECTAL PROLAPSE REPAIR  2007    Rectopexy and sigmoidectomy, s/p rectal prolapse repair x 2 prior to 2007   • SPHINCTEROTOMY  05/2013       Current Outpatient Prescriptions:   •  aspirin 325 MG tablet, Take 325 mg by mouth daily., Disp: , Rfl:   •  diphenoxylate-atropine (LOMOTIL) 2.5-0.025 MG per tablet, Take 1 tablet by mouth. 1-2 tablet every six hours prn diarrhea, Disp: , Rfl:   •  furosemide (LASIX) 20 MG tablet, 1 tablet daily., Disp: , Rfl: 3  •  isosorbide mononitrate (IMDUR) 60 MG 24 hr tablet, 1 tablet daily., Disp: , Rfl: 3  •  lisinopril (PRINIVIL,ZESTRIL) 5 MG tablet, Take 5 mg by mouth daily., Disp: , Rfl:   •  metoprolol tartrate (LOPRESSOR) 25 MG tablet, 1/2 tablet two times daily, Disp: , Rfl: 3  •  pantoprazole (PROTONIX) 40 MG EC tablet, 1 tablet daily., Disp: , Rfl:   •  pentoxifylline (TRENTal) 400 MG CR tablet, 1 tablet 3 (three) times a day., Disp: , Rfl: 3  •  potassium chloride (K-DUR,KLOR-CON) 10 MEQ CR tablet, TAKE 1 TABLET BY  MOUTH DAILY, Disp: 90 tablet, Rfl: 0  •  prasugrel (EFFIENT) tablet, Take 5 mg by mouth daily., Disp: , Rfl:   •  simvastatin (ZOCOR) 10 MG tablet, TK 1 T PO D UTD, Disp: , Rfl: 3  •  zolpidem (AMBIEN) 10 MG tablet, TAKE 1/2 TO 1 TABLET BY MOUTH AT BEDTIME AS NEEDED FOR SLEEP, Disp: 90 tablet, Rfl: 1  •  hydrocortisone (ANUSOL-HC) 2.5 % rectal cream, Apply to externally twice a day as needed, Disp: 1 each, Rfl: 1  •  meclizine 25 MG chewable tablet chewable tablet, Chew 25 mg. 1 tablet every 6-8 hours PRN, Disp: , Rfl:   •  NITROSTAT 0.4 MG SL tablet, 1 tab sublingual every 5 minutes x 3 as needed for chest pressure, Disp: , Rfl: 0  •  ondansetron (ZOFRAN) 4 MG tablet, Take 1 tablet by mouth Every 8 (Eight) Hours As Needed for Nausea or Vomiting. 1 tablet every 4-6 hours as needed for nausea, Disp: 30 tablet, Rfl: 5  •  Pancrelipase, Lip-Prot-Amyl, 19467 units capsule delayed-release particles, Take 1 capsule by mouth Daily With Breakfast, Lunch & Dinner., Disp: 90 capsule, Rfl: 3  •  raNITIdine (ZANTAC) 300 MG tablet, Take 1 tablet by mouth Every Night., Disp: 30 tablet, Rfl: 5  Allergies   Allergen Reactions   • Codeine    • Other      Darvocet   • Shellfish-Derived Products    • Sulfa Antibiotics      Social History     Social History   • Marital status:      Spouse name: N/A   • Number of children: N/A   • Years of education: N/A     Occupational History   • Not on file.     Social History Main Topics   • Smoking status: Former Smoker   • Smokeless tobacco: Not on file   • Alcohol use No   • Drug use: Not on file   • Sexual activity: Not on file     Other Topics Concern   • Not on file     Social History Narrative     Family History   Problem Relation Age of Onset   • Pancreatitis Paternal Aunt      Review of Systems   Constitutional: Positive for appetite change. Negative for unexpected weight change.   Gastrointestinal: Positive for abdominal pain, nausea and vomiting.     Vitals:    10/11/17 1440   BP:  112/70   Temp: 98.3 °F (36.8 °C)     Last Weight    10/11/17  1440   Weight: 98 lb 3.2 oz (44.5 kg)     Physical Exam   Constitutional: She appears well-developed and well-nourished.   HENT:   Head: Normocephalic and atraumatic.   Eyes: No scleral icterus.   Abdominal: Soft. She exhibits no distension and no mass. There is tenderness.   Neurological: She is alert.   Skin: Skin is warm and dry.   Psychiatric: She has a normal mood and affect.     No images are attached to the encounter.  No notes on file  Marilynn was seen today for pancreatitis.    Diagnoses and all orders for this visit:    Dysphagia, unspecified type  -     FL Esophagram Complete; Future    Gastroesophageal reflux disease, esophagitis presence not specified    Idiopathic chronic pancreatitis    Other orders  -     ondansetron (ZOFRAN) 4 MG tablet; Take 1 tablet by mouth Every 8 (Eight) Hours As Needed for Nausea or Vomiting. 1 tablet every 4-6 hours as needed for nausea  -     raNITIdine (ZANTAC) 300 MG tablet; Take 1 tablet by mouth Every Night.  -     Pancrelipase, Lip-Prot-Amyl, 70707 units capsule delayed-release particles; Take 1 capsule by mouth Daily With Breakfast, Lunch & Dinner.    Plan-  Discussed low-fat diet and nutritional supplements  We'll pursue an esophagram for further evaluation of her esophageal dysphagia-I suspect this is more dysmotility than anything  We'll add Zantac at night for further acid suppression.  I'm concerned about her nighttime regurgitation and jessica vomiting and the risk of aspiration.  She should continue pantoprazole the morning if this is afforded her significant symptomatic relief during the day  She had a recent abdominal ultrasound which did not show any significant pancreatic abnormalities.  Obviously this is not the ideal way to image the pancreas however her chronic kidney disease limits our ability to get an updated view of the pancreas without an invasive procedure  Going to restart her on low-dose  pancreatic enzymes

## 2017-10-12 ENCOUNTER — TELEPHONE (OUTPATIENT)
Dept: GASTROENTEROLOGY | Facility: CLINIC | Age: 82
End: 2017-10-12

## 2017-10-12 NOTE — TELEPHONE ENCOUNTER
Check we check with pharmacy to see which pancreatic enzyme replacement would be cheapest for her?

## 2017-10-13 NOTE — TELEPHONE ENCOUNTER
Called pt's pharmacy at [phone] and spoke with pharmacist who did not know of anything to substitute for the creon.      Called the pt and pt advised that she does have some creon 6000 u from 2016 and is asking if she can take this.  Advised pt would send message to Dr Amaro.      Also advised the pt to get on the creon website .  Advised pt that it looks like they do have pt asst program she can enroll.  Pt states she will look this up and call us if she has any problems.      Message sent to Dr Amaro.

## 2017-10-17 NOTE — TELEPHONE ENCOUNTER
Called pt and advised per Dr Amaro that it is ok to use the creon that she has and she recommends signing up for pt assistance. Pt verb understanding.

## 2017-10-23 ENCOUNTER — HOSPITAL ENCOUNTER (OUTPATIENT)
Dept: GENERAL RADIOLOGY | Facility: HOSPITAL | Age: 82
Discharge: HOME | End: 2017-10-23
Attending: INTERNAL MEDICINE | Admitting: INTERNAL MEDICINE

## 2017-10-23 DIAGNOSIS — R13.10 DYSPHAGIA, UNSPECIFIED TYPE: ICD-10-CM

## 2017-10-23 PROCEDURE — 74220 X-RAY XM ESOPHAGUS 1CNTRST: CPT

## 2017-10-26 ENCOUNTER — TELEPHONE (OUTPATIENT)
Dept: GASTROENTEROLOGY | Facility: CLINIC | Age: 82
End: 2017-10-26

## 2017-10-26 DIAGNOSIS — R13.10 DYSPHAGIA, UNSPECIFIED TYPE: Primary | ICD-10-CM

## 2017-10-26 DIAGNOSIS — R19.7 DIARRHEA, UNSPECIFIED TYPE: Primary | ICD-10-CM

## 2017-10-26 NOTE — TELEPHONE ENCOUNTER
----- Message from Garcia Coyle sent at 10/26/2017 11:07 AM EDT -----  Regarding: PT CALLED  Contact: [phone]   PT IS CALLING ABOUT HER ESOPHAGRAM RESULTS  SHE HAD ON Monday ? & ALSO THE MEDICATION CREON, STATED IS NOT WORKING & IS HAVING SOME SIDE AFFECTS FROM IT. PT WOULD LIKE A CALL BACK. THANKS

## 2017-10-26 NOTE — TELEPHONE ENCOUNTER
Spoke with pt - discussed esophagram results.  Need speech eval and maybe therapy.  She had eval at [facility] a few yrs back.  Will get those records and review with speech to see if she needs repeat and if they think they could help her.  Check stool for c diff given changes described.  Stop creon.  Rectal bleeding is chronic from hemorrhoids.  May try xifxan for gas/bloat/loose stools if c diff neg

## 2017-10-26 NOTE — TELEPHONE ENCOUNTER
"Call returned to pt.  States is taking Creon had on hand from DR Boone.  States stomach has \"gone crazy\".  Having 20 soft brown stools/day - states has feeling of urgency all the time and has to strain to pass stool.  Reports bright blood in stool.  Notes a lot of mucous - having to wear pad 2nd incontinence.      States checked on [employer] website as instructed with call of 10/12 - because on Medicare, does not qualify for financial assistance.  Asking what she should do about above symptoms.    Asking for esophagram results - advise that Dr Amaro will review.      Message to DR Amaro.  "

## 2017-10-27 NOTE — TELEPHONE ENCOUNTER
Called [facility] at [phone] and spoke with Meche and requested any swallow studies or esophagram results be faxed to [phone].

## 2017-11-19 DIAGNOSIS — G47.00 INSOMNIA: ICD-10-CM

## 2017-11-21 RX ORDER — ZOLPIDEM TARTRATE 10 MG/1
TABLET ORAL
Qty: 90 TABLET | Refills: 1 | OUTPATIENT
Start: 2017-11-21 | End: 2018-06-14 | Stop reason: SDUPTHER

## 2017-11-27 RX ORDER — POTASSIUM CHLORIDE 750 MG/1
TABLET, EXTENDED RELEASE ORAL
Qty: 90 TABLET | Refills: 3 | Status: SHIPPED | OUTPATIENT
Start: 2017-11-27 | End: 2018-12-26 | Stop reason: SDUPTHER

## 2017-11-30 ENCOUNTER — OFFICE VISIT (OUTPATIENT)
Dept: GASTROENTEROLOGY | Facility: CLINIC | Age: 82
End: 2017-11-30

## 2017-11-30 VITALS
SYSTOLIC BLOOD PRESSURE: 116 MMHG | BODY MASS INDEX: 21.83 KG/M2 | TEMPERATURE: 97.8 F | DIASTOLIC BLOOD PRESSURE: 70 MMHG | WEIGHT: 101.2 LBS | HEIGHT: 57 IN

## 2017-11-30 DIAGNOSIS — R19.7 DIARRHEA, UNSPECIFIED TYPE: Primary | ICD-10-CM

## 2017-11-30 DIAGNOSIS — R13.12 OROPHARYNGEAL DYSPHAGIA: ICD-10-CM

## 2017-11-30 DIAGNOSIS — K86.1 CHRONIC RECURRENT PANCREATITIS (HCC): ICD-10-CM

## 2017-11-30 PROCEDURE — 99213 OFFICE O/P EST LOW 20 MIN: CPT | Performed by: INTERNAL MEDICINE

## 2017-11-30 RX ORDER — DICYCLOMINE HYDROCHLORIDE 10 MG/1
10 CAPSULE ORAL
Qty: 60 CAPSULE | Refills: 2 | Status: SHIPPED | OUTPATIENT
Start: 2017-11-30 | End: 2021-11-23

## 2017-11-30 NOTE — PROGRESS NOTES
Chief Complaint   Patient presents with   • chronic pancreatitis   • Fecal Incontinence   • Black or Bloody Stool   • Abdominal Pain       Marilynn Gil is a  82 y.o. female here for a follow up visit for Diarrhea, fecal incontinence.  Loose stools been a long-standing problem for her.  This seems like it's getting worse.  Yesterday she was in and out of the bathroom all day.  This limits her ability to get out of the house.  She does sometimes see bright red blood on the toilet tissue.  She has known internal hemorrhoids.  She was having discomfort in her abdomen yesterday.  She's had no recent antibiotics and no fever.  She has Lomotil prescribed but she has not used that in a while.  This seems like a progression of her chronic problem.  She seems to be eating better and her weight is up 3 pounds.  We discussed the results of her recent esophagram and she does not wish to pursue any further evaluation of her oropharyngeal swallowing.  She has been noted to have oropharyngeal dysfunction on previous video fluoroscopic swallow study performed at [facility].  She denies melena.She tried a probiotic in the past and this is not afford her any relief.  Since her last visit she tried pancreatic enzymes and this increased her diarrhea.  HPI  Past Medical History:   Diagnosis Date   • Anxiety    • CAD in native artery    • Chronic kidney disease, stage 3    • GERD (gastroesophageal reflux disease)    • Hypercholesterolemia    • Hypertension    • Insomnia    • Mesenteric ischemia, chronic    • Osteoporosis    • Pancreatitis    • Venous thrombosis and embolism      Past Surgical History:   Procedure Laterality Date   • BACK SURGERY      x 4   • BREAST MASS EXCISION      Benign   • CATARACT EXTRACTION Bilateral    • CHOLECYSTECTOMY  08/2013   • COLONOSCOPY  12/2012    scarred mucosa,internal hemorrhoids   • COLONOSCOPY  09/12/2012    Patent side to side ileo colonic anastomosis, one 7mm polyp in the ascending  colon, erythematous and vascular pattern decreased mucosa in the transverse colon, scarred mucosa in the rectum, NBIH.  PATH: Tubular adenoma, Patchy mild chronic inflammation    • CORONARY ANGIOPLASTY      MI 1985, s/p angioplasty 1987   • ENDOSCOPY  12/06/2013    z line irreg 38cm from the incisors, tortuous esophagus, HH, gastritis, bilious gastric fluid, duodenitis.  PATH: Gastric mucosa with minimal chornic infalmmation and with histologic features suggestive of reactive gastropathy.    • PANCREAS SURGERY  05/2013    Pancreatic duct stricture, stent placed   • RECTAL PROLAPSE REPAIR  2007    Rectopexy and sigmoidectomy, s/p rectal prolapse repair x 2 prior to 2007   • SPHINCTEROTOMY  05/2013       Current Outpatient Prescriptions:   •  aspirin 325 MG tablet, Take 325 mg by mouth daily., Disp: , Rfl:   •  diphenoxylate-atropine (LOMOTIL) 2.5-0.025 MG per tablet, Take 1 tablet by mouth. 1-2 tablet every six hours prn diarrhea, Disp: , Rfl:   •  furosemide (LASIX) 20 MG tablet, 1 tablet daily., Disp: , Rfl: 3  •  isosorbide mononitrate (IMDUR) 60 MG 24 hr tablet, 1 tablet daily., Disp: , Rfl: 3  •  lisinopril (PRINIVIL,ZESTRIL) 5 MG tablet, Take 5 mg by mouth daily., Disp: , Rfl:   •  metoprolol tartrate (LOPRESSOR) 25 MG tablet, 1/2 tablet two times daily, Disp: , Rfl: 3  •  ondansetron (ZOFRAN) 4 MG tablet, Take 1 tablet by mouth Every 8 (Eight) Hours As Needed for Nausea or Vomiting. 1 tablet every 4-6 hours as needed for nausea, Disp: 30 tablet, Rfl: 5  •  pantoprazole (PROTONIX) 40 MG EC tablet, 1 tablet daily., Disp: , Rfl:   •  pentoxifylline (TRENTal) 400 MG CR tablet, 1 tablet 3 (three) times a day., Disp: , Rfl: 3  •  potassium chloride (K-DUR,KLOR-CON) 10 MEQ CR tablet, TAKE 1 TABLET BY MOUTH DAILY, Disp: 90 tablet, Rfl: 3  •  prasugrel (EFFIENT) tablet, Take 5 mg by mouth daily., Disp: , Rfl:   •  raNITIdine (ZANTAC) 300 MG tablet, Take 1 tablet by mouth Every Night., Disp: 30 tablet, Rfl: 5  •   simvastatin (ZOCOR) 10 MG tablet, TK 1 T PO D UTD, Disp: , Rfl: 3  •  zolpidem (AMBIEN) 10 MG tablet, TAKE 1/2 TO 1 TABLET BY MOUTH EVERY NIGHT AT BEDTIME AS NEEDED FOR SLEEP, Disp: 90 tablet, Rfl: 1  •  dicyclomine (BENTYL) 10 MG capsule, Take 1 capsule by mouth 4 (Four) Times a Day Before Meals & at Bedtime As Needed (cramping)., Disp: 60 capsule, Rfl: 2  •  hydrocortisone (ANUSOL-HC) 2.5 % rectal cream, Apply to externally twice a day as needed, Disp: 1 each, Rfl: 1  •  meclizine 25 MG chewable tablet chewable tablet, Chew 25 mg. 1 tablet every 6-8 hours PRN, Disp: , Rfl:   •  NITROSTAT 0.4 MG SL tablet, 1 tab sublingual every 5 minutes x 3 as needed for chest pressure, Disp: , Rfl: 0  PRN Meds:.  Allergies   Allergen Reactions   • Codeine    • Other      Darvocet   • Shellfish-Derived Products    • Sulfa Antibiotics      Social History     Social History   • Marital status:      Spouse name: N/A   • Number of children: N/A   • Years of education: N/A     Occupational History   • Not on file.     Social History Main Topics   • Smoking status: Former Smoker     Packs/day: 2.00     Types: Cigarettes     Quit date: 1990   • Smokeless tobacco: Never Used   • Alcohol use No   • Drug use: No   • Sexual activity: Not on file     Other Topics Concern   • Not on file     Social History Narrative     Family History   Problem Relation Age of Onset   • Pancreatitis Paternal Aunt      Review of Systems   Constitutional: Negative for appetite change and unexpected weight change.   Gastrointestinal: Positive for abdominal pain and diarrhea.   Neurological: Positive for tremors.     Vitals:    11/30/17 1204   BP: 116/70   Temp: 97.8 °F (36.6 °C)     Last Weight    11/30/17  1204   Weight: 101 lb 3.2 oz (45.9 kg)     Physical Exam   Constitutional: She appears well-developed and well-nourished.   HENT:   Head: Normocephalic and atraumatic.   Eyes: No scleral icterus.   Pulmonary/Chest: Effort normal.   Abdominal: Soft. She  exhibits no distension.   Neurological: She is alert.   Resting tremor   Skin: Skin is warm and dry.   Psychiatric: She has a normal mood and affect.     No images are attached to the encounter.  Diagnoses and all orders for this visit:    Diarrhea, unspecified type    Chronic recurrent pancreatitis    Oropharyngeal dysphagia    Other orders  -     dicyclomine (BENTYL) 10 MG capsule; Take 1 capsule by mouth 4 (Four) Times a Day Before Meals & at Bedtime As Needed (cramping).      Plan-  Her diarrhea is worse than it has been previously.  No signs of infection.  I think his progression of her usual symptoms.  We'll start her back on scheduled Lomotil 1 in the morning and then she can repeat as needed throughout the day with a maximum of 4 daily.  We discussed pursuing additional studies for her dysphagia and she is deferred additional testing at this time.  She seems to be maintaining her weight and taking an adequate by mouth.  She is going to call me in 2 weeks with a progress report.  She has tried a probiotic in the past with no improvement.  Trial Bentyl low dose when necessary for abdominal discomfort.

## 2017-12-01 ENCOUNTER — TELEPHONE (OUTPATIENT)
Dept: GASTROENTEROLOGY | Facility: CLINIC | Age: 82
End: 2017-12-01

## 2017-12-01 NOTE — TELEPHONE ENCOUNTER
Call received from Nicole @ [facility] ST @ [phone].  States have received ST referral from 11/9.   Therapist is requesting swallow study. See o/v note of 11/30 - pt declining further testing at this time.      VM to Nicole to advise of same - contact office with any questions.

## 2018-01-10 ENCOUNTER — OFFICE VISIT (OUTPATIENT)
Dept: INTERNAL MEDICINE | Facility: CLINIC | Age: 83
End: 2018-01-10

## 2018-01-10 VITALS
DIASTOLIC BLOOD PRESSURE: 60 MMHG | HEIGHT: 57 IN | SYSTOLIC BLOOD PRESSURE: 94 MMHG | WEIGHT: 100 LBS | BODY MASS INDEX: 21.57 KG/M2

## 2018-01-10 DIAGNOSIS — I10 ESSENTIAL HYPERTENSION: ICD-10-CM

## 2018-01-10 DIAGNOSIS — E78.00 HYPERCHOLESTEROLEMIA: ICD-10-CM

## 2018-01-10 DIAGNOSIS — K86.1 CHRONIC RECURRENT PANCREATITIS (HCC): ICD-10-CM

## 2018-01-10 DIAGNOSIS — Z00.00 MEDICARE ANNUAL WELLNESS VISIT, SUBSEQUENT: Primary | ICD-10-CM

## 2018-01-10 LAB
ALBUMIN SERPL-MCNC: 4.2 G/DL (ref 3.5–5.2)
ALBUMIN/GLOB SERPL: 1.1 G/DL
ALP SERPL-CCNC: 143 U/L (ref 39–117)
ALT SERPL W P-5'-P-CCNC: 8 U/L (ref 1–33)
AMYLASE SERPL-CCNC: 120 U/L (ref 28–100)
ANION GAP SERPL CALCULATED.3IONS-SCNC: 6.9 MMOL/L
AST SERPL-CCNC: 18 U/L (ref 1–32)
BILIRUB SERPL-MCNC: 0.4 MG/DL (ref 0.1–1.2)
BUN BLD-MCNC: 26 MG/DL (ref 8–23)
BUN/CREAT SERPL: 17.9 (ref 7–25)
CALCIUM SPEC-SCNC: 9.5 MG/DL (ref 8.6–10.5)
CHLORIDE SERPL-SCNC: 102 MMOL/L (ref 98–107)
CHOLEST SERPL-MCNC: 221 MG/DL (ref 0–200)
CO2 SERPL-SCNC: 33.1 MMOL/L (ref 22–29)
CREAT BLD-MCNC: 1.45 MG/DL (ref 0.57–1)
GFR SERPL CREATININE-BSD FRML MDRD: 35 ML/MIN/1.73
GLOBULIN UR ELPH-MCNC: 3.8 GM/DL
GLUCOSE BLD-MCNC: 104 MG/DL (ref 65–99)
HDLC SERPL-MCNC: 49 MG/DL (ref 40–60)
LDLC SERPL CALC-MCNC: 135 MG/DL (ref 0–100)
LDLC/HDLC SERPL: 2.75 {RATIO}
LIPASE SERPL-CCNC: 66 U/L (ref 13–60)
POTASSIUM BLD-SCNC: 4.5 MMOL/L (ref 3.5–5.2)
PROT SERPL-MCNC: 8 G/DL (ref 6–8.5)
SODIUM BLD-SCNC: 142 MMOL/L (ref 136–145)
TRIGL SERPL-MCNC: 187 MG/DL (ref 0–150)
VLDLC SERPL-MCNC: 37.4 MG/DL (ref 5–40)

## 2018-01-10 PROCEDURE — 99213 OFFICE O/P EST LOW 20 MIN: CPT | Performed by: INTERNAL MEDICINE

## 2018-01-10 PROCEDURE — 90670 PCV13 VACCINE IM: CPT | Performed by: INTERNAL MEDICINE

## 2018-01-10 PROCEDURE — 83690 ASSAY OF LIPASE: CPT | Performed by: INTERNAL MEDICINE

## 2018-01-10 PROCEDURE — 36415 COLL VENOUS BLD VENIPUNCTURE: CPT | Performed by: INTERNAL MEDICINE

## 2018-01-10 PROCEDURE — 80061 LIPID PANEL: CPT | Performed by: INTERNAL MEDICINE

## 2018-01-10 PROCEDURE — 82150 ASSAY OF AMYLASE: CPT | Performed by: INTERNAL MEDICINE

## 2018-01-10 PROCEDURE — 80053 COMPREHEN METABOLIC PANEL: CPT | Performed by: INTERNAL MEDICINE

## 2018-01-10 PROCEDURE — G0009 ADMIN PNEUMOCOCCAL VACCINE: HCPCS | Performed by: INTERNAL MEDICINE

## 2018-01-10 PROCEDURE — G0439 PPPS, SUBSEQ VISIT: HCPCS | Performed by: INTERNAL MEDICINE

## 2018-01-10 NOTE — PROGRESS NOTES
QUICK REFERENCE INFORMATION:  The ABCs of the Annual Wellness Visit    Subsequent Medicare Wellness Visit    HEALTH RISK ASSESSMENT    1935    Recent Hospitalizations:  No hospitalization(s) within the last year..        Current Medical Providers:  Patient Care Team:  Joycelyn Zapata MD as PCP - General  Joycelyn Zapata MD as PCP - Family Medicine  Joycelyn Zapata MD as PCP - Claims Attributed        Smoking Status:  History   Smoking Status   • Former Smoker   • Packs/day: 2.00   • Types: Cigarettes   • Quit date: 1990   Smokeless Tobacco   • Never Used       Alcohol Consumption:  History   Alcohol Use No       Depression Screen:   PHQ-2/PHQ-9 Depression Screening 1/10/2018   Little interest or pleasure in doing things 0   Feeling down, depressed, or hopeless 0   Trouble falling or staying asleep, or sleeping too much 1   Feeling tired or having little energy 1   Poor appetite or overeating 0   Feeling bad about yourself - or that you are a failure or have let yourself or your family down 0   Trouble concentrating on things, such as reading the newspaper or watching television 0   Moving or speaking so slowly that other people could have noticed. Or the opposite - being so fidgety or restless that you have been moving around a lot more than usual 0   Thoughts that you would be better off dead, or of hurting yourself in some way 0   Total Score 2   If you checked off any problems, how difficult have these problems made it for you to do your work, take care of things at home, or get along with other people? Not difficult at all       Health Habits and Functional and Cognitive Screening:  Functional & Cognitive Status 1/10/2018   Do you have difficulty preparing food and eating? No   Do you have difficulty bathing yourself, getting dressed or grooming yourself? No   Do you have difficulty using the toilet? No   Do you have difficulty moving around from place to place? Yes   Do you have trouble with steps or getting out  of a bed or a chair? No   In the past year have you fallen or experienced a near fall? No   Current Diet Low Fat Diet   Dental Exam Up to date   Eye Exam Up to date   Exercise (times per week) 0 times per week   Current Exercise Activities Include None   Do you need help using the phone?  No   Are you deaf or do you have serious difficulty hearing?  No   Do you need help with transportation? No   Do you need help shopping? No   Do you need help preparing meals?  No   Do you need help with housework?  No   Do you need help with laundry? No   Do you need help taking your medications? No   Do you need help managing money? No   Have you felt unusual stress, anger or loneliness in the last month? No   Who do you live with? Alone   If you need help, do you have trouble finding someone available to you? No   Have you been bothered in the last four weeks by sexual problems? No   Do you have difficulty concentrating, remembering or making decisions? No           Does the patient have evidence of cognitive impairment? No    Aspirin use counseling: Taking ASA appropriately as indicated      Recent Lab Results:  CMP:  Lab Results   Component Value Date    GLU 96 03/21/2016    BUN 32 (H) 09/01/2017    CREATININE 1.70 (H) 09/01/2017    EGFRIFNONA 29 (L) 09/01/2017    EGFRIFAFRI 39 (L) 03/21/2016    BCR 18.8 09/01/2017     09/01/2017    K 4.3 09/01/2017    CO2 29.0 09/01/2017    CALCIUM 9.5 09/01/2017    PROTENTOTREF 7.1 03/21/2016    ALBUMIN 3.76 09/01/2017    LABGLOBREF 2.7 03/21/2016    LABIL2 1.0 09/01/2017    BILITOT 0.4 09/01/2017    ALKPHOS 132 (H) 09/01/2017    AST 24 09/01/2017    ALT 22 09/01/2017     Lipid Panel:  Lab Results   Component Value Date    CHOL 224 (H) 09/01/2017    TRIG 221 (H) 09/01/2017    HDL 47 09/01/2017    VLDL 44.2 09/01/2017    LDLCALC 133 (H) 09/01/2017    LDLHDL 2.83 09/01/2017     HbA1c:       Visual Acuity:  No exam data present    Age-appropriate Screening Schedule:  Refer to the list  below for future screening recommendations based on patient's age, sex and/or medical conditions. Orders for these recommended tests are listed in the plan section. The patient has been provided with a written plan.    Health Maintenance   Topic Date Due   • TDAP/TD VACCINES (1 - Tdap) 03/23/1954   • PNEUMOCOCCAL VACCINES (65+ LOW/MEDIUM RISK) (2 of 2 - PPSV23) 09/14/2011   • MAMMOGRAM  03/21/2016   • ZOSTER VACCINE  03/21/2016   • DXA SCAN  08/16/2016   • LIPID PANEL  09/01/2018   • INFLUENZA VACCINE  Completed              Subjective   History of Present Illness    Marilynn Gil is a 82 y.o. female who presents for an Subsequent Wellness Visit.    The following portions of the patient's history were reviewed and updated as appropriate: allergies, current medications, past family history, past medical history, past social history, past surgical history and problem list.    Outpatient Medications Prior to Visit   Medication Sig Dispense Refill   • aspirin 325 MG tablet Take 325 mg by mouth daily.     • dicyclomine (BENTYL) 10 MG capsule Take 1 capsule by mouth 4 (Four) Times a Day Before Meals & at Bedtime As Needed (cramping). 60 capsule 2   • diphenoxylate-atropine (LOMOTIL) 2.5-0.025 MG per tablet Take 1 tablet by mouth. 1-2 tablet every six hours prn diarrhea     • furosemide (LASIX) 20 MG tablet 1 tablet daily.  3   • hydrocortisone (ANUSOL-HC) 2.5 % rectal cream Apply to externally twice a day as needed 1 each 1   • isosorbide mononitrate (IMDUR) 60 MG 24 hr tablet 1 tablet daily.  3   • lisinopril (PRINIVIL,ZESTRIL) 5 MG tablet Take 5 mg by mouth daily.     • meclizine 25 MG chewable tablet chewable tablet Chew 25 mg. 1 tablet every 6-8 hours PRN     • metoprolol tartrate (LOPRESSOR) 25 MG tablet 1/2 tablet two times daily  3   • NITROSTAT 0.4 MG SL tablet 1 tab sublingual every 5 minutes x 3 as needed for chest pressure  0   • ondansetron (ZOFRAN) 4 MG tablet Take 1 tablet by mouth Every 8 (Eight) Hours  "As Needed for Nausea or Vomiting. 1 tablet every 4-6 hours as needed for nausea 30 tablet 5   • pantoprazole (PROTONIX) 40 MG EC tablet 1 tablet daily.     • pentoxifylline (TRENTal) 400 MG CR tablet 1 tablet 3 (three) times a day.  3   • potassium chloride (K-DUR,KLOR-CON) 10 MEQ CR tablet TAKE 1 TABLET BY MOUTH DAILY 90 tablet 3   • prasugrel (EFFIENT) tablet Take 5 mg by mouth daily.     • raNITIdine (ZANTAC) 300 MG tablet Take 1 tablet by mouth Every Night. 30 tablet 5   • simvastatin (ZOCOR) 10 MG tablet TK 1 T PO D UTD  3   • zolpidem (AMBIEN) 10 MG tablet TAKE 1/2 TO 1 TABLET BY MOUTH EVERY NIGHT AT BEDTIME AS NEEDED FOR SLEEP 90 tablet 1     No facility-administered medications prior to visit.        Patient Active Problem List   Diagnosis   • Chronic recurrent pancreatitis   • Hypertension   • Hypercholesterolemia   • CAD in native artery   • Chronic kidney disease, stage 3   • Insomnia   • Dysphagia       Advance Care Planning:  has an advance directive - a copy HAS NOT been provided. Have asked the patient to send this to us to add to record.    Identification of Risk Factors:  Risk factors include: increased fall risk and chronic pain.    Review of Systems    Compared to one year ago, the patient feels her physical health is the same.  Compared to one year ago, the patient feels her mental health is the same.    Objective     Physical Exam    Vitals:    01/10/18 1057   BP: 94/60   Weight: 45.4 kg (100 lb)   Height: 144.8 cm (57\")       Body mass index is 21.64 kg/(m^2).  Discussed the patient's BMI with her. BMI is within normal parameters. No follow-up required.    Assessment/Plan   Patient Self-Management and Personalized Health Advice  The patient has been provided with information about: diet and exercise and preventive services including:   · Pneumococcal vaccine .    Visit Diagnoses:  No diagnosis found.    No orders of the defined types were placed in this encounter.      Outpatient Encounter " Prescriptions as of 1/10/2018   Medication Sig Dispense Refill   • aspirin 325 MG tablet Take 325 mg by mouth daily.     • dicyclomine (BENTYL) 10 MG capsule Take 1 capsule by mouth 4 (Four) Times a Day Before Meals & at Bedtime As Needed (cramping). 60 capsule 2   • diphenoxylate-atropine (LOMOTIL) 2.5-0.025 MG per tablet Take 1 tablet by mouth. 1-2 tablet every six hours prn diarrhea     • furosemide (LASIX) 20 MG tablet 1 tablet daily.  3   • hydrocortisone (ANUSOL-HC) 2.5 % rectal cream Apply to externally twice a day as needed 1 each 1   • isosorbide mononitrate (IMDUR) 60 MG 24 hr tablet 1 tablet daily.  3   • lisinopril (PRINIVIL,ZESTRIL) 5 MG tablet Take 5 mg by mouth daily.     • meclizine 25 MG chewable tablet chewable tablet Chew 25 mg. 1 tablet every 6-8 hours PRN     • metoprolol tartrate (LOPRESSOR) 25 MG tablet 1/2 tablet two times daily  3   • NITROSTAT 0.4 MG SL tablet 1 tab sublingual every 5 minutes x 3 as needed for chest pressure  0   • ondansetron (ZOFRAN) 4 MG tablet Take 1 tablet by mouth Every 8 (Eight) Hours As Needed for Nausea or Vomiting. 1 tablet every 4-6 hours as needed for nausea 30 tablet 5   • pantoprazole (PROTONIX) 40 MG EC tablet 1 tablet daily.     • pentoxifylline (TRENTal) 400 MG CR tablet 1 tablet 3 (three) times a day.  3   • potassium chloride (K-DUR,KLOR-CON) 10 MEQ CR tablet TAKE 1 TABLET BY MOUTH DAILY 90 tablet 3   • prasugrel (EFFIENT) tablet Take 5 mg by mouth daily.     • raNITIdine (ZANTAC) 300 MG tablet Take 1 tablet by mouth Every Night. 30 tablet 5   • simvastatin (ZOCOR) 10 MG tablet TK 1 T PO D UTD  3   • zolpidem (AMBIEN) 10 MG tablet TAKE 1/2 TO 1 TABLET BY MOUTH EVERY NIGHT AT BEDTIME AS NEEDED FOR SLEEP 90 tablet 1     No facility-administered encounter medications on file as of 1/10/2018.        Reviewed use of high risk medication in the elderly: not applicable  Reviewed for potential of harmful drug interactions in the elderly: yes    Follow Up:  No  Follow-up on file.     An After Visit Summary and PPPS with all of these plans were given to the patient.         discussed age-appropriate screening schedule.  She has had the pneumococcal 23 vaccine in the past.  We'll administer Prevnar today.  She declines mammogram, zoster vaccine, bone density,  advised her she could get the tetanus shot at her pharmacy.

## 2018-01-10 NOTE — PATIENT INSTRUCTIONS
Medicare Wellness  Personal Prevention Plan of Service     Date of Office Visit:  01/10/2018  Encounter Provider:  Joycelyn Zapata MD  Place of Service:  [facility]  Patient Name: Marilynn Gil  :  1935    As part of the Medicare Wellness portion of your visit today, we are providing you with this personalized preventive plan of services (PPPS). This plan is based upon recommendations of the [place] Preventive Services Task Force (USPSTF) and the Advisory Committee on Immunization Practices (ACIP).    This lists the preventive care services that should be considered, and provides dates of when you are due. Items listed as completed are up-to-date and do not require any further intervention.    Health Maintenance   Topic Date Due   • TDAP/TD VACCINES (1 - Tdap) 1956   • PNEUMOCOCCAL VACCINES (65+ LOW/MEDIUM RISK) (2 of 2 - PPSV23) 2011   • MEDICARE ANNUAL WELLNESS  2016   • MAMMOGRAM  2016   • ZOSTER VACCINE  2016   • DXA SCAN  2016   • LIPID PANEL  2018   • INFLUENZA VACCINE  Completed       No orders of the defined types were placed in this encounter.      No Follow-up on file.

## 2018-01-10 NOTE — PROGRESS NOTES
Chief Complaint   Patient presents with   • Annual Exam     subsequent wellness exam   • Hypertension   • Hyperlipidemia        Subjective   Marilnyn Gil is a 82 y.o. female     HPI:        Hypertension: This is a chronic problem.   No management changes were made at her last appointment.       She has not had problems with visual disturbance, dizziness.  She has  Headaches at times.  Tylenol helps.  No associated vision changes.  Recent blood pressures have been controlled.    She has not had associated symptoms of chest pain, syncope, edema, orthopnea, GALINDO, PND.     Current treatment: ACE-I, BB  Prudent diet and regular exercise have also been recommended. By report, there is good compliance and good tolerance of medication.     Hyperlipidemia:  This is a chronic problem.   No management changes were made at her last appointment.   Her most recent lipid panel was done on 9/1/2017.  Total cholesterol was 224, Triglycerides 221, HDL 47, .   Current treatment: Statin medication.   Prudent diet and regular exercise have also been recommended. By report, there is good compliance with treatment and good tolerance.    She has not experienced statin associated myalgias, dyspepsia.  She has not had liver enzyme elevation.  Medical history is significant for coronary artery disease.    Chronic pancreatitis:  Symptoms include abdominal pain.  She has recently had gastroenterology evaluation.  Her last amylase done here was slightly elevated.      Coronary artery disease: She has had recent cardiology follow-up.       The following portions of the patient's history were reviewed and updated as appropriate: allergies, current medications, past social history, problem list, past surgical history    Review of Systems   Constitutional: Negative for activity change and appetite change.   HENT: Negative for nosebleeds.    Eyes: Negative for visual disturbance.   Respiratory: Negative for cough and shortness of breath.   "  Cardiovascular: Positive for chest pain (sometimes, NTG helps). Negative for palpitations and leg swelling.   Neurological: Positive for dizziness and headaches. Negative for syncope.   Psychiatric/Behavioral: Negative for sleep disturbance.           Objective     BP 94/60  Ht 144.8 cm (57\")  Wt 45.4 kg (100 lb)  BMI 21.64 kg/m2     Physical Exam   Constitutional: She is oriented to person, place, and time. She appears well-developed and well-nourished. No distress.   Neck: Normal carotid pulses present. Carotid bruit is not present.   Cardiovascular: Normal rate, regular rhythm, S1 normal, S2 normal and intact distal pulses.  Exam reveals no gallop and no friction rub.    No murmur heard.  Pulses:       Carotid pulses are 2+ on the right side, and 2+ on the left side.  Pulmonary/Chest: Effort normal and breath sounds normal. No respiratory distress. She has no wheezes. She has no rhonchi. She has no rales. Chest wall is not dull to percussion.   Musculoskeletal: She exhibits no edema.   Neurological: She is alert and oriented to person, place, and time.   Skin: Skin is warm and dry.   Nursing note and vitals reviewed.      Assessment/Plan   Problem List Items Addressed This Visit        Cardiovascular and Mediastinum    Hypertension    Relevant Orders    Comprehensive Metabolic Panel    Lipid Panel    Hypercholesterolemia       Other    Chronic recurrent pancreatitis    Relevant Orders    Amylase    Lipase      Other Visit Diagnoses     Medicare annual wellness visit, subsequent    -  Primary            "

## 2018-01-16 ENCOUNTER — TREATMENT (OUTPATIENT)
Dept: GASTROENTEROLOGY | Facility: CLINIC | Age: 83
End: 2018-01-16

## 2018-01-17 ENCOUNTER — OFFICE VISIT (OUTPATIENT)
Dept: GASTROENTEROLOGY | Facility: CLINIC | Age: 83
End: 2018-01-17

## 2018-01-17 VITALS
BODY MASS INDEX: 22.27 KG/M2 | WEIGHT: 99 LBS | DIASTOLIC BLOOD PRESSURE: 68 MMHG | HEIGHT: 56 IN | SYSTOLIC BLOOD PRESSURE: 118 MMHG

## 2018-01-17 DIAGNOSIS — K90.9 DIARRHEA DUE TO MALABSORPTION: ICD-10-CM

## 2018-01-17 DIAGNOSIS — R13.12 OROPHARYNGEAL DYSPHAGIA: Primary | ICD-10-CM

## 2018-01-17 DIAGNOSIS — R10.13 EPIGASTRIC PAIN: ICD-10-CM

## 2018-01-17 DIAGNOSIS — R19.7 DIARRHEA DUE TO MALABSORPTION: ICD-10-CM

## 2018-01-17 PROCEDURE — 99213 OFFICE O/P EST LOW 20 MIN: CPT | Performed by: INTERNAL MEDICINE

## 2018-01-17 NOTE — PROGRESS NOTES
Chief Complaint   Patient presents with   • Follow-up       Marilynn Gil is a  82 y.o. female here for a follow up visit for oropharyngeal dysphasia, chronic pancreatitis and diarrhea.    The diarrhea persists - she takes bentyl 2 times daily.  She thinks this helps a little but still having issues with control.  She is not using lomotil regularly.    Her swallowing continues to be challenging.  She has had a recent negative esophagram and previous [facility]SS at [facility] after her CVA.  She does not wish to repeat this at this time but it showed oropharyngeal dysphagia.  She avoids meats and eats a modified diet.    She complains of midepigastric pain.  Recent mildly elevated panc enzymes.  She did not tolerate pancreatic enzyme replacement as it increased her diarrhea.  She does think the nausea is better and gerd at night improved with zantac.  HPI  Past Medical History:   Diagnosis Date   • Anxiety    • CAD in native artery    • Chronic kidney disease, stage 3    • GERD (gastroesophageal reflux disease)    • Hypercholesterolemia    • Hypertension    • Insomnia    • Mesenteric ischemia, chronic    • Osteoporosis    • Pancreatitis    • Venous thrombosis and embolism      Past Surgical History:   Procedure Laterality Date   • BACK SURGERY      x 4   • BREAST MASS EXCISION      Benign   • CATARACT EXTRACTION Bilateral    • CHOLECYSTECTOMY  08/2013   • COLONOSCOPY  12/2012    scarred mucosa,internal hemorrhoids   • COLONOSCOPY  09/12/2012    Patent side to side ileo colonic anastomosis, one 7mm polyp in the ascending colon, erythematous and vascular pattern decreased mucosa in the transverse colon, scarred mucosa in the rectum, NBIH.  PATH: Tubular adenoma, Patchy mild chronic inflammation    • CORONARY ANGIOPLASTY      MI 1985, s/p angioplasty 1987   • ENDOSCOPY  12/06/2013    z line irreg 38cm from the incisors, tortuous esophagus, HH, gastritis, bilious gastric fluid, duodenitis.  PATH: Gastric mucosa with minimal  chornic infalmmation and with histologic features suggestive of reactive gastropathy.    • PANCREAS SURGERY  05/2013    Pancreatic duct stricture, stent placed   • RECTAL PROLAPSE REPAIR  2007    Rectopexy and sigmoidectomy, s/p rectal prolapse repair x 2 prior to 2007   • SPHINCTEROTOMY  05/2013       Current Outpatient Prescriptions:   •  aspirin 325 MG tablet, Take 325 mg by mouth daily., Disp: , Rfl:   •  dicyclomine (BENTYL) 10 MG capsule, Take 1 capsule by mouth 4 (Four) Times a Day Before Meals & at Bedtime As Needed (cramping)., Disp: 60 capsule, Rfl: 2  •  diphenoxylate-atropine (LOMOTIL) 2.5-0.025 MG per tablet, Take 1 tablet by mouth. 1-2 tablet every six hours prn diarrhea, Disp: , Rfl:   •  furosemide (LASIX) 20 MG tablet, 1 tablet daily., Disp: , Rfl: 3  •  hydrocortisone (ANUSOL-HC) 2.5 % rectal cream, Apply to externally twice a day as needed, Disp: 1 each, Rfl: 1  •  isosorbide mononitrate (IMDUR) 60 MG 24 hr tablet, 1 tablet daily., Disp: , Rfl: 3  •  lisinopril (PRINIVIL,ZESTRIL) 5 MG tablet, Take 5 mg by mouth daily., Disp: , Rfl:   •  meclizine 25 MG chewable tablet chewable tablet, Chew 25 mg. 1 tablet every 6-8 hours PRN, Disp: , Rfl:   •  metoprolol tartrate (LOPRESSOR) 25 MG tablet, 1/2 tablet two times daily, Disp: , Rfl: 3  •  NITROSTAT 0.4 MG SL tablet, 1 tab sublingual every 5 minutes x 3 as needed for chest pressure, Disp: , Rfl: 0  •  ondansetron (ZOFRAN) 4 MG tablet, Take 1 tablet by mouth Every 8 (Eight) Hours As Needed for Nausea or Vomiting. 1 tablet every 4-6 hours as needed for nausea, Disp: 30 tablet, Rfl: 5  •  pantoprazole (PROTONIX) 40 MG EC tablet, 1 tablet daily., Disp: , Rfl:   •  pentoxifylline (TRENTal) 400 MG CR tablet, 1 tablet 3 (three) times a day., Disp: , Rfl: 3  •  potassium chloride (K-DUR,KLOR-CON) 10 MEQ CR tablet, TAKE 1 TABLET BY MOUTH DAILY, Disp: 90 tablet, Rfl: 3  •  prasugrel (EFFIENT) tablet, Take 5 mg by mouth daily., Disp: , Rfl:   •  raNITIdine  (ZANTAC) 300 MG tablet, Take 1 tablet by mouth Every Night., Disp: 30 tablet, Rfl: 5  •  simvastatin (ZOCOR) 10 MG tablet, TK 1 T PO D UTD, Disp: , Rfl: 3  •  zolpidem (AMBIEN) 10 MG tablet, TAKE 1/2 TO 1 TABLET BY MOUTH EVERY NIGHT AT BEDTIME AS NEEDED FOR SLEEP, Disp: 90 tablet, Rfl: 1  PRN Meds:.  Allergies   Allergen Reactions   • Codeine    • Other      Darvocet   • Shellfish-Derived Products    • Sulfa Antibiotics      Social History     Social History   • Marital status:      Spouse name: N/A   • Number of children: N/A   • Years of education: N/A     Occupational History   • Not on file.     Social History Main Topics   • Smoking status: Former Smoker     Packs/day: 2.00     Types: Cigarettes     Quit date: 1990   • Smokeless tobacco: Never Used   • Alcohol use No   • Drug use: No   • Sexual activity: Not on file     Other Topics Concern   • Not on file     Social History Narrative     Family History   Problem Relation Age of Onset   • Pancreatitis Paternal Aunt      Review of Systems   Constitutional: Positive for appetite change. Negative for unexpected weight change.   Gastrointestinal: Positive for abdominal distention, abdominal pain, diarrhea and nausea.   Neurological: Positive for tremors and weakness.     Vitals:    01/17/18 1329   BP: 118/68     Last Weight    01/17/18  1329   Weight: 44.9 kg (99 lb)     Physical Exam   Constitutional: She appears well-developed and well-nourished.   HENT:   Head: Normocephalic and atraumatic.   Eyes: No scleral icterus.   Abdominal: Soft. She exhibits no distension and no mass. There is tenderness.       Neurological: She is alert.   Skin: Skin is warm and dry.   Psychiatric: She has a normal mood and affect.     No images are attached to the encounter.  Diagnoses and all orders for this visit:    Oropharyngeal dysphagia    Diarrhea due to malabsorption    Epigastric pain         Plan:  - schedule lomotil in the morning and can take second dose in am or  later if not significantly improved and still can take up to 4 total prn  - decrease bentyl - she thinks it may be making her stomach hurt  - increase po - try alternative supplements such as glucerna and ensure clear as the others made her vomit  - continued modification of diet given swallowing difficulties

## 2018-02-21 ENCOUNTER — OFFICE VISIT (OUTPATIENT)
Dept: INTERNAL MEDICINE | Facility: CLINIC | Age: 83
End: 2018-02-21

## 2018-02-21 VITALS
SYSTOLIC BLOOD PRESSURE: 132 MMHG | HEIGHT: 56 IN | DIASTOLIC BLOOD PRESSURE: 70 MMHG | WEIGHT: 100 LBS | BODY MASS INDEX: 22.5 KG/M2

## 2018-02-21 DIAGNOSIS — G60.9 IDIOPATHIC PERIPHERAL NEUROPATHY: Primary | ICD-10-CM

## 2018-02-21 LAB
ERYTHROCYTE [SEDIMENTATION RATE] IN BLOOD: 28 MM/HR (ref 0–30)
FOLATE SERPL-MCNC: 19.94 NG/ML (ref 4.78–24.2)
TSH SERPL DL<=0.05 MIU/L-ACNC: 1.11 MIU/ML (ref 0.27–4.2)
VIT B12 BLD-MCNC: 411 PG/ML (ref 211–946)

## 2018-02-21 PROCEDURE — 85652 RBC SED RATE AUTOMATED: CPT | Performed by: INTERNAL MEDICINE

## 2018-02-21 PROCEDURE — 82607 VITAMIN B-12: CPT | Performed by: INTERNAL MEDICINE

## 2018-02-21 PROCEDURE — 99214 OFFICE O/P EST MOD 30 MIN: CPT | Performed by: INTERNAL MEDICINE

## 2018-02-21 PROCEDURE — 84443 ASSAY THYROID STIM HORMONE: CPT | Performed by: INTERNAL MEDICINE

## 2018-02-21 PROCEDURE — 82746 ASSAY OF FOLIC ACID SERUM: CPT | Performed by: INTERNAL MEDICINE

## 2018-02-21 RX ORDER — GABAPENTIN 100 MG/1
100 CAPSULE ORAL NIGHTLY
Qty: 30 CAPSULE | Refills: 3 | Status: SHIPPED | OUTPATIENT
Start: 2018-02-21 | End: 2018-03-19 | Stop reason: ALTCHOICE

## 2018-02-21 NOTE — PROGRESS NOTES
"Chief Complaint   Patient presents with   • Tingling     tingling and burning in hands, redness        Subjective   Marilynn Gil is a 82 y.o. female     HPI: She comes in for evaluation of tingling and burning of her hands and feet.  This is a new problem This has been going on for a month.  It is persistent, it happens daily and at night, it seems to be getting worse.  It is worse at night.  At night, she has burning in the palms of her hands.  She has to hold onto a glass of ice water to help it.  During the day, she has tingling of her palms of her hands and soles of her feet.  If she is asleep, she'll wake up with burning in her palms of her hands.  She has noticed problems grasping things and sometimes drops things.        The following portions of the patient's history were reviewed and updated as appropriate: allergies, current medications, past social history, problem list, past surgical history    Review of Systems   Constitutional: Positive for fatigue. Negative for appetite change, chills and fever.   Respiratory: Positive for shortness of breath (no change). Negative for cough and wheezing.    Cardiovascular: Positive for chest pain (sometimes). Negative for palpitations and leg swelling.   Endocrine: Positive for cold intolerance (a little). Negative for heat intolerance.   Musculoskeletal: Negative for arthralgias.   Skin: Negative for rash (the palms of her hands look red when they burn but otherwise she has no skin changes.).   Neurological: Positive for dizziness. Negative for weakness.           Objective     /70  Ht 142.2 cm (56\")  Wt 45.4 kg (100 lb)  BMI 22.42 kg/m2     Physical Exam   Constitutional: She appears well-developed and well-nourished. No distress.   Eyes: Conjunctivae are normal.   Neck: Neck supple.   Cardiovascular: Normal rate and regular rhythm.  Exam reveals no gallop and no friction rub.    No murmur heard.  Pulses:       Radial pulses are 2+ on the right side, " and 2+ on the left side.        Dorsalis pedis pulses are 1+ on the right side, and 1+ on the left side.   Pulmonary/Chest: Effort normal and breath sounds normal. She has no wheezes. She has no rhonchi. She has no rales.   Musculoskeletal: Normal range of motion. She exhibits no edema or tenderness.   Neurological:   Some decreased sensitivity to light touch on the tips of her fingers bilaterally.   Nursing note and vitals reviewed.      Assessment/Plan   Problem List Items Addressed This Visit     None      Visit Diagnoses     Idiopathic peripheral neuropathy    -  Primary    Relevant Orders    TSH Rfx On Abnormal To Free T4    Vitamin B12 & Folate    ZINA With / DsDNA, RNP, Sjogrens A / B, Smith    Sedimentation Rate

## 2018-02-22 LAB — ANA SER QL: NEGATIVE

## 2018-02-27 ENCOUNTER — TELEPHONE (OUTPATIENT)
Dept: INTERNAL MEDICINE | Facility: CLINIC | Age: 83
End: 2018-02-27

## 2018-02-27 DIAGNOSIS — R20.2 PARESTHESIA: Primary | ICD-10-CM

## 2018-02-27 NOTE — TELEPHONE ENCOUNTER
I put in referral to a neurologist.  I recommend she come to the acute clinic tomorrow to further evaluate the cold feeling. OK to stop neurontin

## 2018-02-27 NOTE — TELEPHONE ENCOUNTER
"----- Message from Liz KADE Guerra sent at 2/27/2018  3:21 PM EST -----  Contact: pt - Dr Zapata' pt - RE: Referral, new Rx, Appt  Pt calling and would like to inform that Gabapentin is not helping, pt would like to know if Rx can be discontinued, as it makes pt dizzy - like a \"hang-over\". Pt would like to informs soul like referral for neurologist consult. Pt informs that pt is staying cold, especially on (L) side - like ice, using 4 blankets, wearing long shirts & pants. Pt was given results of lab. Could you please call pt to discuss? Please advise. Thanks        Pt # [phone]  "

## 2018-02-27 NOTE — TELEPHONE ENCOUNTER
Ms Gil states its a referral to a neurologist for her hands and feet. Also is concerned about the coldness she has, last night was really bad

## 2018-03-14 ENCOUNTER — TELEPHONE (OUTPATIENT)
Dept: GASTROENTEROLOGY | Facility: CLINIC | Age: 83
End: 2018-03-14

## 2018-03-14 RX ORDER — PANTOPRAZOLE SODIUM 40 MG/1
40 TABLET, DELAYED RELEASE ORAL DAILY
Qty: 90 TABLET | Refills: 3 | Status: SHIPPED | OUTPATIENT
Start: 2018-03-14 | End: 2019-06-14 | Stop reason: SDUPTHER

## 2018-03-14 NOTE — TELEPHONE ENCOUNTER
----- Message from Sheila Zhao sent at 3/14/2018 11:56 AM EDT -----  Regarding: PT CALLED - SCRIPT FOR PANTOPRAZOLE 40MG QD  Contact: [phone]  PT STATED DR CONRAD HAD AGREED TO FILL SCRIPT FOR HER. PHARM INFO IS RUT. PT ASK FOR 90 DAY SCRIPT WITH REFILL.

## 2018-03-19 ENCOUNTER — OFFICE VISIT (OUTPATIENT)
Dept: NEUROLOGY | Facility: CLINIC | Age: 83
End: 2018-03-19

## 2018-03-19 VITALS
BODY MASS INDEX: 21.57 KG/M2 | WEIGHT: 100 LBS | SYSTOLIC BLOOD PRESSURE: 119 MMHG | DIASTOLIC BLOOD PRESSURE: 60 MMHG | HEIGHT: 57 IN

## 2018-03-19 DIAGNOSIS — G25.0 ESSENTIAL TREMOR: Primary | ICD-10-CM

## 2018-03-19 DIAGNOSIS — G60.0 HEREDITARY MOTOR AND SENSORY NEUROPATHY: ICD-10-CM

## 2018-03-19 DIAGNOSIS — G62.9 POLYNEUROPATHY: ICD-10-CM

## 2018-03-19 PROCEDURE — 99214 OFFICE O/P EST MOD 30 MIN: CPT | Performed by: PSYCHIATRY & NEUROLOGY

## 2018-03-19 RX ORDER — DULOXETIN HYDROCHLORIDE 30 MG/1
30 CAPSULE, DELAYED RELEASE ORAL NIGHTLY
Qty: 30 CAPSULE | Refills: 5 | Status: SHIPPED | OUTPATIENT
Start: 2018-03-19 | End: 2018-06-21

## 2018-03-19 NOTE — PROGRESS NOTES
Subjective:     Patient ID: Marilynn Gil is a 82 y.o. female.    History of Present Illness     Patient is an 83-year-old right-handed woman who was seen for further evaluation of numbness and tingling in her hands and feet.  She's had this for several months but his been worse over the past 6 weeks.  Her palms are read as are the soles of her feet.  She will wake up with this is mainly at nighttime.  This is the first time I'm seeing this patient as a patient.  I did perform a test on the patient 2 years ago which was EMG and nerve conduction study of the right lower extremity which was normal.  She has never been through chemotherapy.  She is not diabetic.  She is not on any new medication.  I reviewed her medicines.  She was tried on gabapentin which she could not tolerate.  She also has a tremor which is in her voice head and hands.  This is been present for a few years.  He is on metoprolol.  The following portions of the patient's history were reviewed and updated as appropriate: allergies, current medications, past family history, past medical history, past social history, past surgical history and problem list.    Family History   Problem Relation Age of Onset   • Cancer Brother    • Pancreatitis Paternal Aunt    • Diabetes Father      Active Ambulatory Problems     Diagnosis Date Noted   • Chronic recurrent pancreatitis    • Hypertension    • Hypercholesterolemia    • CAD in native artery    • Chronic kidney disease, stage 3    • Insomnia 09/01/2017   • Dysphagia 10/11/2017   • Polyneuropathy 03/19/2018   • Essential tremor 03/19/2018     Resolved Ambulatory Problems     Diagnosis Date Noted   • No Resolved Ambulatory Problems     Past Medical History:   Diagnosis Date   • Anxiety    • CAD in native artery    • Chronic kidney disease, stage 3    • GERD (gastroesophageal reflux disease)    • Hypercholesterolemia    • Hypertension    • Insomnia    • Mesenteric ischemia, chronic    • Osteoporosis    •  Pancreatitis    • Stroke    • Venous thrombosis and embolism      Social History     Social History   • Marital status:      Spouse name: N/A   • Number of children: N/A   • Years of education: N/A     Occupational History   • Not on file.     Social History Main Topics   • Smoking status: Former Smoker     Packs/day: 2.00     Types: Cigarettes     Quit date: 1990   • Smokeless tobacco: Never Used   • Alcohol use No   • Drug use: No   • Sexual activity: Not on file     Other Topics Concern   • Not on file     Social History Narrative   • No narrative on file       Current Outpatient Prescriptions:   •  aspirin 325 MG tablet, Take 325 mg by mouth daily., Disp: , Rfl:   •  dicyclomine (BENTYL) 10 MG capsule, Take 1 capsule by mouth 4 (Four) Times a Day Before Meals & at Bedtime As Needed (cramping)., Disp: 60 capsule, Rfl: 2  •  diphenoxylate-atropine (LOMOTIL) 2.5-0.025 MG per tablet, Take 1 tablet by mouth. 1-2 tablet every six hours prn diarrhea, Disp: , Rfl:   •  furosemide (LASIX) 20 MG tablet, 1 tablet daily., Disp: , Rfl: 3  •  hydrocortisone (ANUSOL-HC) 2.5 % rectal cream, Apply to externally twice a day as needed, Disp: 1 each, Rfl: 1  •  isosorbide mononitrate (IMDUR) 60 MG 24 hr tablet, 1 tablet daily., Disp: , Rfl: 3  •  lisinopril (PRINIVIL,ZESTRIL) 5 MG tablet, Take 5 mg by mouth daily., Disp: , Rfl:   •  metoprolol tartrate (LOPRESSOR) 25 MG tablet, 1/2 tablet two times daily, Disp: , Rfl: 3  •  NITROSTAT 0.4 MG SL tablet, 1 tab sublingual every 5 minutes x 3 as needed for chest pressure, Disp: , Rfl: 0  •  ondansetron (ZOFRAN) 4 MG tablet, Take 1 tablet by mouth Every 8 (Eight) Hours As Needed for Nausea or Vomiting. 1 tablet every 4-6 hours as needed for nausea, Disp: 30 tablet, Rfl: 5  •  pantoprazole (PROTONIX) 40 MG EC tablet, Take 1 tablet by mouth Daily., Disp: 90 tablet, Rfl: 3  •  pentoxifylline (TRENTal) 400 MG CR tablet, 1 tablet 3 (three) times a day., Disp: , Rfl: 3  •  potassium  chloride (K-DUR,KLOR-CON) 10 MEQ CR tablet, TAKE 1 TABLET BY MOUTH DAILY, Disp: 90 tablet, Rfl: 3  •  prasugrel (EFFIENT) tablet, Take 5 mg by mouth daily., Disp: , Rfl:   •  raNITIdine (ZANTAC) 300 MG tablet, Take 1 tablet by mouth Every Night., Disp: 30 tablet, Rfl: 5  •  simvastatin (ZOCOR) 10 MG tablet, TK 1 T PO D UTD, Disp: , Rfl: 3  •  zolpidem (AMBIEN) 10 MG tablet, TAKE 1/2 TO 1 TABLET BY MOUTH EVERY NIGHT AT BEDTIME AS NEEDED FOR SLEEP, Disp: 90 tablet, Rfl: 1  •  DULoxetine (CYMBALTA) 30 MG capsule, Take 1 capsule by mouth Every Night., Disp: 30 capsule, Rfl: 5  •  meclizine 25 MG chewable tablet chewable tablet, Chew 25 mg. 1 tablet every 6-8 hours PRN, Disp: , Rfl:     Review of Systems   Constitutional: Positive for diaphoresis and fatigue. Negative for activity change, appetite change, chills, fever and unexpected weight change.   HENT: Positive for trouble swallowing. Negative for congestion, dental problem, drooling, ear discharge, ear pain, facial swelling, hearing loss, mouth sores, nosebleeds, postnasal drip, rhinorrhea, sinus pain, sinus pressure, sneezing, sore throat, tinnitus and voice change.    Eyes: Positive for itching. Negative for photophobia, pain, discharge, redness and visual disturbance.   Respiratory: Positive for cough, choking and wheezing. Negative for apnea, chest tightness, shortness of breath and stridor.    Cardiovascular: Positive for chest pain. Negative for palpitations and leg swelling.   Gastrointestinal: Positive for abdominal pain, diarrhea and nausea. Negative for abdominal distention, anal bleeding, blood in stool, constipation, rectal pain and vomiting.   Endocrine: Negative.    Musculoskeletal: Positive for back pain and myalgias. Negative for arthralgias, gait problem, joint swelling, neck pain and neck stiffness.   Skin: Negative.    Allergic/Immunologic: Negative.    Neurological: Positive for dizziness, tremors, weakness, light-headedness and numbness.  Negative for seizures, syncope, facial asymmetry, speech difficulty and headaches.   Hematological: Negative.    Psychiatric/Behavioral: Negative.         Objective:    Neurologic Exam  Mental status examination showed normal orientation, memory, and speech.  Attention span and concentration were normal.  Fund of knowledge was normal.  Funduscopic showed no abnormality.  Visual fields were full.  Pupillary reflexes were 5 mm, symmetric, and equally reactive to light.  Eye movements were full and conjugate.  Gag reflex was normal.  Hearing was normal.  Muscles of mastication were normal.  No facial weakness was noted.  Shoulder shrug strength was normal bilaterally.  Tongue protrudes midline.  There is no drift of outstretched arms.  Deep tendon reflexes are 2+ knees and absent at the ankles.  She has a tremor in her voice head and hands that is low amplitude high frequency.  No focal weakness or atrophy was noted.  Tone was normal in all extremities.  No cerebellar signs were noted.  No abnormal movements were noted.  The patient's gait was normal.  No other pathologic reflexes such as Babinski's sign were noted.  No sensory abnormalities were noted.  Physical Exam    Assessment/Plan:     Marilynn was seen today for neurologic problem.    Diagnoses and all orders for this visit:    Essential tremor    Polyneuropathy  -     EMG & Nerve Conduction Test; Future    Hereditary motor and sensory neuropathy   -     EMG & Nerve Conduction Test; Future    Other orders  -     DULoxetine (CYMBALTA) 30 MG capsule; Take 1 capsule by mouth Every Night.  -     Cancel: EMG & Nerve Conduction Test; Standing       The patient's symptoms are indicative of a polyneuropathy.  Etiology is not clear.  She is artery had a B12 TSH and sedimentation rate that are normal.  He has failed gabapentin.  I started her on Cymbalta 30 mg every night.  I set up EMG and nerve conduction studies of all 4 extremities.  I plan to see her back in the office  in one month.  I have not ordered further labs at this point. Thank you for allowing me to share in the care of this patient.  Pierre Shearer M.D.

## 2018-04-03 ENCOUNTER — HOSPITAL ENCOUNTER (OUTPATIENT)
Dept: INFUSION THERAPY | Facility: HOSPITAL | Age: 83
Discharge: HOME | End: 2018-04-03
Attending: PSYCHIATRY & NEUROLOGY | Admitting: PSYCHIATRY & NEUROLOGY

## 2018-04-03 DIAGNOSIS — G60.0 HEREDITARY MOTOR AND SENSORY NEUROPATHY: ICD-10-CM

## 2018-04-03 DIAGNOSIS — G62.9 POLYNEUROPATHY: ICD-10-CM

## 2018-04-03 PROCEDURE — 95912 NRV CNDJ TEST 11-12 STUDIES: CPT

## 2018-04-03 PROCEDURE — 95912 NRV CNDJ TEST 11-12 STUDIES: CPT | Performed by: PSYCHIATRY & NEUROLOGY

## 2018-04-03 NOTE — PROCEDURES
EMG REPORT    Indication: Burning and erythema of the palms and soles.    Findings: Bilateral median and ulnar sensory study showed normal latencies and amplitudes.  Bilateral median motor study showed normal distal latencies velocities and amplitudes.  Bilateral ulnar motor study showed normal distal latencies and amplitudes.  Left peroneal motor study showed normal distal latency velocity and amplitude.  Bilateral tibial motor study showed normal distal latencies amplitudes and F wave latencies.    Impression: Normal nerve conduction studies of all 4 extremities.    Thank you for sending this patient for further evaluation.  Pierre Shearer M.D.

## 2018-04-13 ENCOUNTER — TELEPHONE (OUTPATIENT)
Dept: GASTROENTEROLOGY | Facility: CLINIC | Age: 83
End: 2018-04-13

## 2018-04-13 ENCOUNTER — LAB REQUISITION (OUTPATIENT)
Dept: LAB | Facility: HOSPITAL | Age: 83
End: 2018-04-13

## 2018-04-13 DIAGNOSIS — Z00.00 ROUTINE GENERAL MEDICAL EXAMINATION AT A HEALTH CARE FACILITY: ICD-10-CM

## 2018-04-13 LAB
AMYLASE SERPL-CCNC: 102 U/L (ref 28–100)
ANION GAP SERPL CALCULATED.3IONS-SCNC: 12.9 MMOL/L
BASOPHILS # BLD AUTO: 0.01 10*3/MM3 (ref 0–0.2)
BASOPHILS NFR BLD AUTO: 0.2 % (ref 0–1.5)
BUN BLD-MCNC: 16 MG/DL (ref 8–23)
BUN/CREAT SERPL: 14.4 (ref 7–25)
CALCIUM SPEC-SCNC: 8.8 MG/DL (ref 8.6–10.5)
CHLORIDE SERPL-SCNC: 101 MMOL/L (ref 98–107)
CO2 SERPL-SCNC: 29.1 MMOL/L (ref 22–29)
CREAT BLD-MCNC: 1.11 MG/DL (ref 0.57–1)
DEPRECATED RDW RBC AUTO: 49.7 FL (ref 37–54)
EOSINOPHIL # BLD AUTO: 0.16 10*3/MM3 (ref 0–0.7)
EOSINOPHIL NFR BLD AUTO: 2.7 % (ref 0.3–6.2)
ERYTHROCYTE [DISTWIDTH] IN BLOOD BY AUTOMATED COUNT: 13.9 % (ref 11.7–13)
GFR SERPL CREATININE-BSD FRML MDRD: 47 ML/MIN/1.73
GLUCOSE BLD-MCNC: 91 MG/DL (ref 65–99)
HCT VFR BLD AUTO: 39.1 % (ref 35.6–45.5)
HGB BLD-MCNC: 11.9 G/DL (ref 11.9–15.5)
IMM GRANULOCYTES # BLD: 0 10*3/MM3 (ref 0–0.03)
IMM GRANULOCYTES NFR BLD: 0 % (ref 0–0.5)
LIPASE SERPL-CCNC: 28 U/L (ref 13–60)
LYMPHOCYTES # BLD AUTO: 1.56 10*3/MM3 (ref 0.9–4.8)
LYMPHOCYTES NFR BLD AUTO: 26.3 % (ref 19.6–45.3)
MCH RBC QN AUTO: 30.3 PG (ref 26.9–32)
MCHC RBC AUTO-ENTMCNC: 30.4 G/DL (ref 32.4–36.3)
MCV RBC AUTO: 99.5 FL (ref 80.5–98.2)
MONOCYTES # BLD AUTO: 0.67 10*3/MM3 (ref 0.2–1.2)
MONOCYTES NFR BLD AUTO: 11.3 % (ref 5–12)
NEUTROPHILS # BLD AUTO: 3.53 10*3/MM3 (ref 1.9–8.1)
NEUTROPHILS NFR BLD AUTO: 59.5 % (ref 42.7–76)
PLATELET # BLD AUTO: 150 10*3/MM3 (ref 140–500)
PMV BLD AUTO: 9.4 FL (ref 6–12)
POTASSIUM BLD-SCNC: 3.7 MMOL/L (ref 3.5–5.2)
RBC # BLD AUTO: 3.93 10*6/MM3 (ref 3.9–5.2)
SODIUM BLD-SCNC: 143 MMOL/L (ref 136–145)
WBC NRBC COR # BLD: 5.93 10*3/MM3 (ref 4.5–10.7)

## 2018-04-13 PROCEDURE — 83690 ASSAY OF LIPASE: CPT

## 2018-04-13 PROCEDURE — 82150 ASSAY OF AMYLASE: CPT

## 2018-04-13 PROCEDURE — 80048 BASIC METABOLIC PNL TOTAL CA: CPT

## 2018-04-13 PROCEDURE — 85025 COMPLETE CBC W/AUTO DIFF WBC: CPT

## 2018-04-13 NOTE — TELEPHONE ENCOUNTER
----- Message from Garcia Coyle sent at 4/13/2018  2:14 PM EDT -----  Regarding: PT ANDREEA HEARN IS CALLING   Contact: [phone]  PT ANDREEA HEARN IS CALLING TO MAKE PT AN APPT BUT  DOESN'T HAVE ANYTHING & THE NP DOESN'T EITHER UNTIL MAY 1ST. HE DOESN'T THINK SHE CAN WAIT THAT LONG & WANTS TO SPEAK WITH A NURSE ABOUT HER CONDITION, PT HAD A BAD ACCIDENT & FELL & BROKE HER NOSE & BROKE HER ELBOW AND A FEW OTHERS. HE WOULD LIKE A CALL BACK CONCERNING HER SYMPTOMS.

## 2018-04-13 NOTE — TELEPHONE ENCOUNTER
I can see her on Monday -1130  I agree that she needs an xray and labs and she needs meds to help her have a BM which the [facility] MD should be able to order in the interim (miralax would be fine)

## 2018-04-13 NOTE — TELEPHONE ENCOUNTER
Called pt's nephew and he reports that one week ago his aunt fell.  She broke her nose, cut her lip, fx'd her c6 and elbow. He reports that his aunt took a bad step and this is why she has fallen.  He states she has been at [facility] and is currently in [facility] .  He reports that the pt feels like her pancreatitis may be acting up. She is currently not able to keep food down  and is very nauseated.  He is also reporting that she has not had bm in one wk.  The MD at rehab is ordering some lab work and an abd xray.  They want her to get in as soon possible. He is asking for Dr Amaro's opinion on this.  Advised would send message to Dr Amaro and would also send message to manager to find appt.  Advised the nephew to have [facility] fax us the lab and xray results to [phone].  He verb understanding.

## 2018-04-16 ENCOUNTER — OFFICE VISIT (OUTPATIENT)
Dept: GASTROENTEROLOGY | Facility: CLINIC | Age: 83
End: 2018-04-16

## 2018-04-16 VITALS
HEIGHT: 59 IN | SYSTOLIC BLOOD PRESSURE: 126 MMHG | TEMPERATURE: 97.4 F | BODY MASS INDEX: 20.2 KG/M2 | WEIGHT: 100.2 LBS | DIASTOLIC BLOOD PRESSURE: 62 MMHG

## 2018-04-16 DIAGNOSIS — T40.2X5A THERAPEUTIC OPIOID-INDUCED CONSTIPATION (OIC): ICD-10-CM

## 2018-04-16 DIAGNOSIS — K21.9 GASTROESOPHAGEAL REFLUX DISEASE, ESOPHAGITIS PRESENCE NOT SPECIFIED: ICD-10-CM

## 2018-04-16 DIAGNOSIS — K59.03 THERAPEUTIC OPIOID-INDUCED CONSTIPATION (OIC): ICD-10-CM

## 2018-04-16 DIAGNOSIS — R13.10 DYSPHAGIA, UNSPECIFIED TYPE: ICD-10-CM

## 2018-04-16 DIAGNOSIS — R60.0 LEG EDEMA, LEFT: ICD-10-CM

## 2018-04-16 DIAGNOSIS — K86.1 IDIOPATHIC CHRONIC PANCREATITIS (HCC): ICD-10-CM

## 2018-04-16 PROCEDURE — 99214 OFFICE O/P EST MOD 30 MIN: CPT | Performed by: INTERNAL MEDICINE

## 2018-04-16 NOTE — PROGRESS NOTES
Chief Complaint   Patient presents with   • Diarrhea   • Difficulty Swallowing       Marilynn Gil is a  83 y.o. female here for a follow up visit for Difficulty swallowing, nausea vomiting and regurgitation.  She recently suffered trauma after a fall including orbital fractures, aspiration to the inside of her mouth, cervical fracture and right upper extremity fracture.  She's been rehabilitating at the [facility].  She is receiving narcotic pain medications.  She called 3 days ago reporting that she had not had a bowel movement in 2 weeks.  She was nauseated and dry heaving all day.  She states that anything she puts in her mouth was coming back up.    She has a history of chronic pancreatitis and has chronic nausea baseline.  She also has esophageal dysmotility and has had difficulty tolerating diet at times.  She does not typically suffer from constipation.  Her weight had been fairly stable of late but certainly in the low range of normal.    She did receive some laxatives over the weekend and had a bowel movement finally.  She is complaining of a lot of heartburn and regurgitation.  She is on pantoprazole once a day.  She was just started on Reglan by the nursing facility and she has been receiving regular Zofran.    She complains of left lower extremity swelling.  She's had a history of DVT in the past.  She was on Lasix chronically for this but this was stopped after she was discharged from [facility].  She was on 20 mg of Lasix in the past..   HPI  Past Medical History:   Diagnosis Date   • Anxiety    • CAD in native artery    • Chronic kidney disease, stage 3    • GERD (gastroesophageal reflux disease)    • Hypercholesterolemia    • Hypertension    • Insomnia    • Mesenteric ischemia, chronic    • Osteoporosis    • Pancreatitis    • Stroke    • Venous thrombosis and embolism      Past Surgical History:   Procedure Laterality Date   • BACK SURGERY      x 4   • BREAST MASS EXCISION       Benign   • CATARACT EXTRACTION Bilateral    • CHOLECYSTECTOMY  08/2013   • COLONOSCOPY  12/2012    scarred mucosa,internal hemorrhoids   • COLONOSCOPY  09/12/2012    Patent side to side ileo colonic anastomosis, one 7mm polyp in the ascending colon, erythematous and vascular pattern decreased mucosa in the transverse colon, scarred mucosa in the rectum, NBIH.  PATH: Tubular adenoma, Patchy mild chronic inflammation    • CORONARY ANGIOPLASTY      MI 1985, s/p angioplasty 1987   • ENDOSCOPY  12/06/2013    z line irreg 38cm from the incisors, tortuous esophagus, HH, gastritis, bilious gastric fluid, duodenitis.  PATH: Gastric mucosa with minimal chornic infalmmation and with histologic features suggestive of reactive gastropathy.    • PANCREAS SURGERY  05/2013    Pancreatic duct stricture, stent placed   • RECTAL PROLAPSE REPAIR  2007    Rectopexy and sigmoidectomy, s/p rectal prolapse repair x 2 prior to 2007   • SPHINCTEROTOMY  05/2013       Current Outpatient Prescriptions:   •  aspirin 325 MG tablet, Take 325 mg by mouth daily., Disp: , Rfl:   •  dicyclomine (BENTYL) 10 MG capsule, Take 1 capsule by mouth 4 (Four) Times a Day Before Meals & at Bedtime As Needed (cramping)., Disp: 60 capsule, Rfl: 2  •  diphenoxylate-atropine (LOMOTIL) 2.5-0.025 MG per tablet, Take 1 tablet by mouth. 1-2 tablet every six hours prn diarrhea, Disp: , Rfl:   •  DULoxetine (CYMBALTA) 30 MG capsule, Take 1 capsule by mouth Every Night., Disp: 30 capsule, Rfl: 5  •  furosemide (LASIX) 20 MG tablet, 1 tablet daily., Disp: , Rfl: 3  •  hydrocortisone (ANUSOL-HC) 2.5 % rectal cream, Apply to externally twice a day as needed, Disp: 1 each, Rfl: 1  •  isosorbide mononitrate (IMDUR) 60 MG 24 hr tablet, 1 tablet daily., Disp: , Rfl: 3  •  lisinopril (PRINIVIL,ZESTRIL) 5 MG tablet, Take 5 mg by mouth daily., Disp: , Rfl:   •  meclizine 25 MG chewable tablet chewable tablet, Chew 25 mg. 1 tablet every 6-8 hours PRN, Disp: , Rfl:   •  metoprolol  tartrate (LOPRESSOR) 25 MG tablet, 1/2 tablet two times daily, Disp: , Rfl: 3  •  NITROSTAT 0.4 MG SL tablet, 1 tab sublingual every 5 minutes x 3 as needed for chest pressure, Disp: , Rfl: 0  •  ondansetron (ZOFRAN) 4 MG tablet, Take 1 tablet by mouth Every 8 (Eight) Hours As Needed for Nausea or Vomiting. 1 tablet every 4-6 hours as needed for nausea, Disp: 30 tablet, Rfl: 5  •  pantoprazole (PROTONIX) 40 MG EC tablet, Take 1 tablet by mouth Daily., Disp: 90 tablet, Rfl: 3  •  pentoxifylline (TRENTal) 400 MG CR tablet, 1 tablet 3 (three) times a day., Disp: , Rfl: 3  •  potassium chloride (K-DUR,KLOR-CON) 10 MEQ CR tablet, TAKE 1 TABLET BY MOUTH DAILY, Disp: 90 tablet, Rfl: 3  •  prasugrel (EFFIENT) tablet, Take 5 mg by mouth daily., Disp: , Rfl:   •  raNITIdine (ZANTAC) 300 MG tablet, Take 1 tablet by mouth Every Night., Disp: 30 tablet, Rfl: 5  •  simvastatin (ZOCOR) 10 MG tablet, TK 1 T PO D UTD, Disp: , Rfl: 3  •  zolpidem (AMBIEN) 10 MG tablet, TAKE 1/2 TO 1 TABLET BY MOUTH EVERY NIGHT AT BEDTIME AS NEEDED FOR SLEEP, Disp: 90 tablet, Rfl: 1  PRN Meds:.  Allergies   Allergen Reactions   • Codeine    • Other      Darvocet   • Shellfish-Derived Products    • Sulfa Antibiotics      Social History     Social History   • Marital status:      Spouse name: N/A   • Number of children: N/A   • Years of education: N/A     Occupational History   • Not on file.     Social History Main Topics   • Smoking status: Former Smoker     Packs/day: 2.00     Types: Cigarettes     Quit date: 1990   • Smokeless tobacco: Never Used   • Alcohol use No   • Drug use: No   • Sexual activity: Not on file     Other Topics Concern   • Not on file     Social History Narrative   • No narrative on file     Family History   Problem Relation Age of Onset   • Cancer Brother    • Pancreatitis Paternal Aunt    • Diabetes Father      Review of Systems   Constitutional: Positive for appetite change and unexpected weight change.   HENT: Positive  for trouble swallowing.    Cardiovascular: Positive for leg swelling.   Gastrointestinal: Positive for abdominal distention, constipation, nausea and vomiting. Negative for blood in stool.   Neurological: Positive for weakness.   All other systems reviewed and are negative.    Vitals:    04/16/18 1151   BP: 126/62   Temp: 97.4 °F (36.3 °C)     1    04/16/18  1151   Weight: 45.5 kg (100 lb 3.2 oz)     Physical Exam   Constitutional: She is oriented to person, place, and time. She appears well-developed and well-nourished.   HENT:   Head: Normocephalic and atraumatic.   Eyes: Conjunctivae are normal. No scleral icterus.   Neck: Normal range of motion. Neck supple.   Pulmonary/Chest: Effort normal.   Abdominal: Soft. Bowel sounds are normal. She exhibits no distension. There is tenderness.       Musculoskeletal: She exhibits edema and tenderness.   2+ left lower extremity edema  Left calf tenderness   Neurological: She is alert and oriented to person, place, and time.   Skin: Skin is warm and dry.   Psychiatric: She has a normal mood and affect.   Nursing note and vitals reviewed.    No images are attached to the encounter.  Diagnoses and all orders for this visit:    Dysphagia, unspecified type  Comments:  Due to esophageal dysmotility, with oropharyngeal component    Leg edema, left  -     Duplex Venous Lower Extremity - Left CAR; Future    Idiopathic chronic pancreatitis    Gastroesophageal reflux disease, esophagitis presence not specified    Therapeutic opioid-induced constipation (OIC)             Plan:  - increase pantoprazole to 40 mg bid  - recommend using narcotics as sparingly as possible- substitute tylenol when possible and when narcotics needed, use the least possible dose  - add liquid nutritional supplements such as ensure clear 3 times daily in addition to soft diet  - continue zofran q 6 hours - agree with trial reglan  - miralax and sennakot prn for opioid induced constipation which seems to be  better  - recommend doppler u/s lle to r/o DVT with swelling which is worse and hx DVT - if negative, may need to resume her previous home dose lasix 20 mg

## 2018-04-16 NOTE — TELEPHONE ENCOUNTER
Called pt's nephew and left vm for him to call back.     Lab results received from [facility] and scanned under media tab.

## 2018-04-17 ENCOUNTER — APPOINTMENT (OUTPATIENT)
Dept: CARDIOLOGY | Facility: HOSPITAL | Age: 83
End: 2018-04-17
Attending: INTERNAL MEDICINE

## 2018-04-25 ENCOUNTER — TELEPHONE (OUTPATIENT)
Dept: GASTROENTEROLOGY | Facility: CLINIC | Age: 83
End: 2018-04-25

## 2018-04-26 ENCOUNTER — TELEPHONE (OUTPATIENT)
Dept: NEUROLOGY | Facility: CLINIC | Age: 83
End: 2018-04-26

## 2018-04-26 NOTE — TELEPHONE ENCOUNTER
Pt recently fell and is in a lot pain she a xray done and would like Tyron to look over it. She also wanted a f/u appt so scheduled her for 5/17.

## 2018-04-26 NOTE — TELEPHONE ENCOUNTER
Attempt call to pt - memory full.      VM to Kenroy jones (see HIPAA auth of 3/19/18) with request to contact office.

## 2018-04-27 NOTE — TELEPHONE ENCOUNTER
Call to sonKenroy.   pt still @ [facility] on [address].  States pt's cell # is  - demographic updated.    Call to that #.  Pt  had doppler and was positive for blood clot.  On blood thinner.   continues therapy at [facility] and hopes to be able to return home in 1-2 weeks.  Expresses much appreciation to Dr Amaro for her help.    Update to Dr Amaro.

## 2018-05-17 ENCOUNTER — OFFICE VISIT (OUTPATIENT)
Dept: INTERNAL MEDICINE | Facility: CLINIC | Age: 83
End: 2018-05-17

## 2018-05-17 VITALS
DIASTOLIC BLOOD PRESSURE: 82 MMHG | WEIGHT: 92 LBS | BODY MASS INDEX: 18.55 KG/M2 | HEIGHT: 59 IN | SYSTOLIC BLOOD PRESSURE: 124 MMHG

## 2018-05-17 DIAGNOSIS — K86.1 CHRONIC RECURRENT PANCREATITIS (HCC): ICD-10-CM

## 2018-05-17 DIAGNOSIS — E78.00 HYPERCHOLESTEROLEMIA: ICD-10-CM

## 2018-05-17 DIAGNOSIS — I10 ESSENTIAL HYPERTENSION: Primary | ICD-10-CM

## 2018-05-17 DIAGNOSIS — Z87.81: ICD-10-CM

## 2018-05-17 LAB
ALBUMIN SERPL-MCNC: 3.9 G/DL (ref 3.5–5.2)
ALBUMIN/GLOB SERPL: 1.3 G/DL
ALP SERPL-CCNC: 115 U/L (ref 39–117)
ALT SERPL W P-5'-P-CCNC: 10 U/L (ref 1–33)
ANION GAP SERPL CALCULATED.3IONS-SCNC: 12.7 MMOL/L
ARTICHOKE IGE QN: 102 MG/DL (ref 0–100)
AST SERPL-CCNC: 15 U/L (ref 1–32)
BILIRUB SERPL-MCNC: 0.3 MG/DL (ref 0.1–1.2)
BUN BLD-MCNC: 15 MG/DL (ref 8–23)
BUN/CREAT SERPL: 13.2 (ref 7–25)
CALCIUM SPEC-SCNC: 9.4 MG/DL (ref 8.6–10.5)
CHLORIDE SERPL-SCNC: 102 MMOL/L (ref 98–107)
CHOLEST SERPL-MCNC: 232 MG/DL (ref 0–200)
CO2 SERPL-SCNC: 27.3 MMOL/L (ref 22–29)
CREAT BLD-MCNC: 1.14 MG/DL (ref 0.57–1)
GFR SERPL CREATININE-BSD FRML MDRD: 46 ML/MIN/1.73
GLOBULIN UR ELPH-MCNC: 3 GM/DL
GLUCOSE BLD-MCNC: 95 MG/DL (ref 65–99)
HDLC SERPL-MCNC: 47 MG/DL (ref 40–60)
LDLC SERPL CALC-MCNC: ABNORMAL MG/DL (ref 0–100)
LDLC/HDLC SERPL: ABNORMAL {RATIO}
POTASSIUM BLD-SCNC: 4.1 MMOL/L (ref 3.5–5.2)
PROT SERPL-MCNC: 6.9 G/DL (ref 6–8.5)
SODIUM BLD-SCNC: 142 MMOL/L (ref 136–145)
TRIGL SERPL-MCNC: 443 MG/DL (ref 0–150)
VLDLC SERPL-MCNC: ABNORMAL MG/DL (ref 5–40)

## 2018-05-17 PROCEDURE — 99214 OFFICE O/P EST MOD 30 MIN: CPT | Performed by: INTERNAL MEDICINE

## 2018-05-17 PROCEDURE — 80061 LIPID PANEL: CPT | Performed by: INTERNAL MEDICINE

## 2018-05-17 PROCEDURE — 80053 COMPREHEN METABOLIC PANEL: CPT | Performed by: INTERNAL MEDICINE

## 2018-05-17 PROCEDURE — 36415 COLL VENOUS BLD VENIPUNCTURE: CPT | Performed by: INTERNAL MEDICINE

## 2018-05-17 PROCEDURE — 83721 ASSAY OF BLOOD LIPOPROTEIN: CPT | Performed by: INTERNAL MEDICINE

## 2018-05-17 NOTE — PROGRESS NOTES
Chief Complaint   Patient presents with   • Hyperlipidemia     4 month follow up/rehab follow up   • Hypertension       Subjective   Marilynn Gil is a 83 y.o. female.     Since her last appointment here, she was hospitalized at [facility] after a fall.  This happened in April.  She had a fracture in the neck area.  A cervical collar was recommended.  [facility]  She was discharged home from there about 2 weeks ago.  She has been wearing the cervical collar and wonders if she can stop it.   She had a follow-up C-spine series on May 4.  In this fall, she also fractured her nose.  She has been having some discomfort in her nose since then.      Hyperlipidemia   This is a chronic problem. Recent lipid tests were reviewed and are variable. She has no history of diabetes, hypothyroidism or obesity. There are no known factors aggravating her hyperlipidemia. Pertinent negatives include no chest pain, leg pain, myalgias or shortness of breath. Current antihyperlipidemic treatment includes statins and diet change. The current treatment provides moderate improvement of lipids.   Hypertension   This is a chronic problem. The problem is controlled. Associated symptoms include neck pain. Pertinent negatives include no blurred vision, chest pain, headaches, orthopnea, palpitations, peripheral edema, PND or shortness of breath. There are no associated agents to hypertension. Current antihypertension treatment includes ACE inhibitors and diuretics. The current treatment provides significant improvement. There are no compliance problems.  Hypertensive end-organ damage includes CAD/MI.        The following portions of the patient's history were reviewed and updated as appropriate: allergies, current medications, past family history, past medical history, past social history, past surgical history and problem list.    Review of Systems   Constitutional: Negative for appetite change.   HENT:  Negative for nosebleeds.    Eyes: Negative for blurred vision and double vision.   Respiratory: Negative for cough and shortness of breath.    Cardiovascular: Negative for chest pain, palpitations, orthopnea, leg swelling and PND.   Musculoskeletal: Positive for neck pain. Negative for myalgias.         Current Outpatient Prescriptions:   •  aspirin 325 MG tablet, Take 325 mg by mouth daily., Disp: , Rfl:   •  dicyclomine (BENTYL) 10 MG capsule, Take 1 capsule by mouth 4 (Four) Times a Day Before Meals & at Bedtime As Needed (cramping)., Disp: 60 capsule, Rfl: 2  •  diphenoxylate-atropine (LOMOTIL) 2.5-0.025 MG per tablet, Take 1 tablet by mouth. 1-2 tablet every six hours prn diarrhea, Disp: , Rfl:   •  DULoxetine (CYMBALTA) 30 MG capsule, Take 1 capsule by mouth Every Night., Disp: 30 capsule, Rfl: 5  •  furosemide (LASIX) 20 MG tablet, 1 tablet daily., Disp: , Rfl: 3  •  hydrocortisone (ANUSOL-HC) 2.5 % rectal cream, Apply to externally twice a day as needed, Disp: 1 each, Rfl: 1  •  isosorbide mononitrate (IMDUR) 60 MG 24 hr tablet, 1 tablet daily., Disp: , Rfl: 3  •  lisinopril (PRINIVIL,ZESTRIL) 5 MG tablet, Take 5 mg by mouth daily., Disp: , Rfl:   •  meclizine 25 MG chewable tablet chewable tablet, Chew 25 mg. 1 tablet every 6-8 hours PRN, Disp: , Rfl:   •  metoprolol tartrate (LOPRESSOR) 25 MG tablet, 1/2 tablet two times daily, Disp: , Rfl: 3  •  NITROSTAT 0.4 MG SL tablet, 1 tab sublingual every 5 minutes x 3 as needed for chest pressure, Disp: , Rfl: 0  •  ondansetron (ZOFRAN) 4 MG tablet, Take 1 tablet by mouth Every 8 (Eight) Hours As Needed for Nausea or Vomiting. 1 tablet every 4-6 hours as needed for nausea, Disp: 30 tablet, Rfl: 5  •  pantoprazole (PROTONIX) 40 MG EC tablet, Take 1 tablet by mouth Daily., Disp: 90 tablet, Rfl: 3  •  pentoxifylline (TRENTal) 400 MG CR tablet, 1 tablet 3 (three) times a day., Disp: , Rfl: 3  •  potassium chloride (K-DUR,KLOR-CON) 10 MEQ CR tablet, TAKE 1 TABLET BY  "MOUTH DAILY, Disp: 90 tablet, Rfl: 3  •  prasugrel (EFFIENT) tablet, Take 5 mg by mouth daily., Disp: , Rfl:   •  raNITIdine (ZANTAC) 300 MG tablet, Take 1 tablet by mouth Every Night., Disp: 30 tablet, Rfl: 5  •  simvastatin (ZOCOR) 10 MG tablet, TK 1 T PO D UTD, Disp: , Rfl: 3  •  zolpidem (AMBIEN) 10 MG tablet, TAKE 1/2 TO 1 TABLET BY MOUTH EVERY NIGHT AT BEDTIME AS NEEDED FOR SLEEP, Disp: 90 tablet, Rfl: 1        Objective     /82   Ht 149.9 cm (59\")   Wt 41.7 kg (92 lb)   BMI 18.58 kg/m²     Physical Exam   Constitutional: She is oriented to person, place, and time. She appears well-developed and well-nourished. No distress.   Neck: Normal carotid pulses present. Carotid bruit is not present.   Cardiovascular: Regular rhythm, S1 normal and S2 normal.  Exam reveals no gallop and no friction rub.    No murmur heard.  Pulses:       Carotid pulses are 2+ on the right side, and 2+ on the left side.  Pulmonary/Chest: Effort normal and breath sounds normal. She has no wheezes. She has no rhonchi. She has no rales. Chest wall is not dull to percussion.   Musculoskeletal: She exhibits no edema.   Neurological: She is alert and oriented to person, place, and time.   Skin: Skin is warm and dry.   Nursing note and vitals reviewed.        Assessment/Plan   Marilynn was seen today for hyperlipidemia and hypertension.    Diagnoses and all orders for this visit:    Essential hypertension  -     Comprehensive Metabolic Panel; Future  -     Lipid Panel; Future  -     Comprehensive Metabolic Panel  -     Lipid Panel    Hypercholesterolemia    H/O fracture of nose  -     Ambulatory Referral to ENT (Otolaryngology)    Chronic recurrent pancreatitis      Reviewed recent C-spine series.  On the report, it indicates that previously identified fracture was not seen.  She has been wearing a cervical collar for just over 6 weeks.  At this time, we're going to stop it.         "

## 2018-06-14 DIAGNOSIS — G47.00 INSOMNIA: ICD-10-CM

## 2018-06-15 RX ORDER — ZOLPIDEM TARTRATE 10 MG/1
TABLET ORAL
Qty: 30 TABLET | Refills: 0 | OUTPATIENT
Start: 2018-06-15 | End: 2018-07-16 | Stop reason: SDUPTHER

## 2018-06-21 ENCOUNTER — OFFICE VISIT (OUTPATIENT)
Dept: NEUROLOGY | Facility: CLINIC | Age: 83
End: 2018-06-21

## 2018-06-21 VITALS
BODY MASS INDEX: 19.15 KG/M2 | DIASTOLIC BLOOD PRESSURE: 62 MMHG | SYSTOLIC BLOOD PRESSURE: 104 MMHG | HEIGHT: 59 IN | OXYGEN SATURATION: 90 % | HEART RATE: 67 BPM | WEIGHT: 95 LBS

## 2018-06-21 DIAGNOSIS — G25.0 ESSENTIAL TREMOR: ICD-10-CM

## 2018-06-21 DIAGNOSIS — G62.9 POLYNEUROPATHY: Primary | ICD-10-CM

## 2018-06-21 PROCEDURE — 99213 OFFICE O/P EST LOW 20 MIN: CPT | Performed by: PSYCHIATRY & NEUROLOGY

## 2018-06-21 RX ORDER — PREGABALIN 25 MG/1
25 CAPSULE ORAL NIGHTLY
Qty: 30 CAPSULE | Refills: 2 | Status: SHIPPED | OUTPATIENT
Start: 2018-06-21 | End: 2018-10-02

## 2018-06-21 NOTE — PROGRESS NOTES
Subjective:     Patient ID: Marilynn Gil is a 83 y.o. female.    History of Present Illness     Patient was last seen in March.  She is seen for neuropathy presumably.  She had an EMG done following that visit which showed normal nerve conduction studies of all 4 extremities.  She has never been on chemotherapy.  I placed her on Cymbalta at the last visit and she was unable to tolerate this because of GI side effects.  Her symptoms have worsened.  He complains that her hands are red and burning.  Reviewed normal CBC.    The following portions of the patient's history were reviewed and updated as appropriate: allergies, current medications, past family history, past medical history, past social history, past surgical history and problem list.  Current Outpatient Prescriptions on File Prior to Visit   Medication Sig Dispense Refill   • aspirin 325 MG tablet Take 325 mg by mouth daily.     • diphenoxylate-atropine (LOMOTIL) 2.5-0.025 MG per tablet Take 1 tablet by mouth. 1-2 tablet every six hours prn diarrhea     • furosemide (LASIX) 20 MG tablet 1 tablet daily.  3   • isosorbide mononitrate (IMDUR) 60 MG 24 hr tablet 1 tablet daily.  3   • lisinopril (PRINIVIL,ZESTRIL) 5 MG tablet Take 5 mg by mouth daily.     • metoprolol tartrate (LOPRESSOR) 25 MG tablet 1/2 tablet two times daily  3   • NITROSTAT 0.4 MG SL tablet 1 tab sublingual every 5 minutes x 3 as needed for chest pressure  0   • ondansetron (ZOFRAN) 4 MG tablet Take 1 tablet by mouth Every 8 (Eight) Hours As Needed for Nausea or Vomiting. 1 tablet every 4-6 hours as needed for nausea 30 tablet 5   • pantoprazole (PROTONIX) 40 MG EC tablet Take 1 tablet by mouth Daily. 90 tablet 3   • pentoxifylline (TRENTal) 400 MG CR tablet 1 tablet 3 (three) times a day.  3   • potassium chloride (K-DUR,KLOR-CON) 10 MEQ CR tablet TAKE 1 TABLET BY MOUTH DAILY 90 tablet 3   • prasugrel (EFFIENT) tablet Take 5 mg by mouth daily.     • raNITIdine (ZANTAC) 300 MG tablet  Take 1 tablet by mouth Every Night. 30 tablet 5   • simvastatin (ZOCOR) 10 MG tablet TK 1 T PO D UTD  3   • zolpidem (AMBIEN) 10 MG tablet TAKE 1/2 TO 1 TABLET BY MOUTH AT BEDTIME AS NEEDED FOR SLEEP 30 tablet 0   • dicyclomine (BENTYL) 10 MG capsule Take 1 capsule by mouth 4 (Four) Times a Day Before Meals & at Bedtime As Needed (cramping). 60 capsule 2   • hydrocortisone (ANUSOL-HC) 2.5 % rectal cream Apply to externally twice a day as needed 1 each 1   • meclizine 25 MG chewable tablet chewable tablet Chew 25 mg. 1 tablet every 6-8 hours PRN     • [DISCONTINUED] DULoxetine (CYMBALTA) 30 MG capsule Take 1 capsule by mouth Every Night. 30 capsule 5     No current facility-administered medications on file prior to visit.        Review of Systems   Constitutional: Negative for chills, fatigue and fever.   HENT: Positive for trouble swallowing (food and occasionally liquid). Negative for ear pain and tinnitus.    Eyes: Positive for visual disturbance (since the fall she has noticed her vision her worsened). Negative for photophobia and pain.   Respiratory: Negative for cough, chest tightness and shortness of breath.    Cardiovascular: Negative for chest pain, palpitations and leg swelling.   Gastrointestinal: Positive for nausea. Negative for blood in stool and vomiting.   Genitourinary: Negative for difficulty urinating, hematuria and pelvic pain.   Musculoskeletal: Positive for gait problem and neck pain (pt broke her neck still has pain). Negative for neck stiffness.   Skin: Negative for rash.   Allergic/Immunologic: Negative for environmental allergies, food allergies and immunocompromised state.   Neurological: Positive for dizziness, tremors and headaches (Daily). Negative for seizures, syncope, facial asymmetry, speech difficulty, weakness, light-headedness and numbness.   Hematological: Bruises/bleeds easily.   Psychiatric/Behavioral: Positive for sleep disturbance. Negative for agitation, behavioral problems,  confusion, decreased concentration, dysphoric mood, hallucinations, self-injury and suicidal ideas. The patient is not nervous/anxious and is not hyperactive.         Objective:    Neurologic Exam  Mental status examination was appropriate.  Funduscopy, visual fields, eye movements and pupillary reflexes were normal.  No facial weakness was noted.  Gait was normal.  No pattern of focal weakness was noted.  Physical Exam    Assessment/Plan:     Marilynn was seen today for tremors.    Diagnoses and all orders for this visit:    Polyneuropathy    Essential tremor    Other orders  -     pregabalin (LYRICA) 25 MG capsule; Take 1 capsule by mouth Every Night.         The only other reasonable medicine to try is Lyrica.  I placed her on a very small dose 25 mg at night.  Phone follow-up in 3 weeks.  When necessary follow-up in the office at this point. Thank you for allowing me to share in the care of this patient.  Pierre Shearer M.D.

## 2018-07-16 DIAGNOSIS — G47.00 INSOMNIA: ICD-10-CM

## 2018-07-16 RX ORDER — ZOLPIDEM TARTRATE 10 MG/1
TABLET ORAL
Qty: 30 TABLET | Refills: 0 | Status: SHIPPED | OUTPATIENT
Start: 2018-07-16 | End: 2018-08-14 | Stop reason: SDUPTHER

## 2018-08-14 DIAGNOSIS — G47.00 INSOMNIA: ICD-10-CM

## 2018-08-14 RX ORDER — ZOLPIDEM TARTRATE 10 MG/1
TABLET ORAL
Qty: 90 TABLET | Refills: 0 | OUTPATIENT
Start: 2018-08-14 | End: 2018-11-12 | Stop reason: SDUPTHER

## 2018-10-02 ENCOUNTER — OFFICE VISIT (OUTPATIENT)
Dept: INTERNAL MEDICINE | Facility: CLINIC | Age: 83
End: 2018-10-02

## 2018-10-02 VITALS
DIASTOLIC BLOOD PRESSURE: 78 MMHG | HEART RATE: 64 BPM | WEIGHT: 99 LBS | BODY MASS INDEX: 19.96 KG/M2 | SYSTOLIC BLOOD PRESSURE: 130 MMHG | OXYGEN SATURATION: 95 % | HEIGHT: 59 IN

## 2018-10-02 DIAGNOSIS — E78.00 HYPERCHOLESTEROLEMIA: ICD-10-CM

## 2018-10-02 DIAGNOSIS — M50.90 CERVICAL DISC DISEASE: ICD-10-CM

## 2018-10-02 DIAGNOSIS — I10 ESSENTIAL HYPERTENSION: Primary | ICD-10-CM

## 2018-10-02 LAB
ALBUMIN SERPL-MCNC: 4.5 G/DL (ref 3.5–5.2)
ALBUMIN/GLOB SERPL: 1.3 G/DL
ALP SERPL-CCNC: 133 U/L (ref 39–117)
ALT SERPL W P-5'-P-CCNC: 8 U/L (ref 1–33)
ANION GAP SERPL CALCULATED.3IONS-SCNC: 14.7 MMOL/L
AST SERPL-CCNC: 16 U/L (ref 1–32)
BILIRUB SERPL-MCNC: 0.4 MG/DL (ref 0.1–1.2)
BUN BLD-MCNC: 41 MG/DL (ref 8–23)
BUN/CREAT SERPL: 20.8 (ref 7–25)
CALCIUM SPEC-SCNC: 10.4 MG/DL (ref 8.6–10.5)
CHLORIDE SERPL-SCNC: 104 MMOL/L (ref 98–107)
CHOLEST SERPL-MCNC: 249 MG/DL (ref 0–200)
CO2 SERPL-SCNC: 27.3 MMOL/L (ref 22–29)
CREAT BLD-MCNC: 1.97 MG/DL (ref 0.57–1)
GFR SERPL CREATININE-BSD FRML MDRD: 24 ML/MIN/1.73
GLOBULIN UR ELPH-MCNC: 3.6 GM/DL
GLUCOSE BLD-MCNC: 116 MG/DL (ref 65–99)
HDLC SERPL-MCNC: 63 MG/DL (ref 40–60)
LDLC SERPL CALC-MCNC: 141 MG/DL (ref 0–100)
LDLC/HDLC SERPL: 2.24 {RATIO}
POTASSIUM BLD-SCNC: 5.3 MMOL/L (ref 3.5–5.2)
PROT SERPL-MCNC: 8.1 G/DL (ref 6–8.5)
SODIUM BLD-SCNC: 146 MMOL/L (ref 136–145)
TRIGL SERPL-MCNC: 223 MG/DL (ref 0–150)
VLDLC SERPL-MCNC: 44.6 MG/DL (ref 5–40)

## 2018-10-02 PROCEDURE — 99214 OFFICE O/P EST MOD 30 MIN: CPT | Performed by: INTERNAL MEDICINE

## 2018-10-02 PROCEDURE — G0008 ADMIN INFLUENZA VIRUS VAC: HCPCS | Performed by: INTERNAL MEDICINE

## 2018-10-02 PROCEDURE — 90662 IIV NO PRSV INCREASED AG IM: CPT | Performed by: INTERNAL MEDICINE

## 2018-10-02 PROCEDURE — 80061 LIPID PANEL: CPT | Performed by: INTERNAL MEDICINE

## 2018-10-02 PROCEDURE — 36415 COLL VENOUS BLD VENIPUNCTURE: CPT | Performed by: INTERNAL MEDICINE

## 2018-10-02 PROCEDURE — 80053 COMPREHEN METABOLIC PANEL: CPT | Performed by: INTERNAL MEDICINE

## 2018-10-02 NOTE — PROGRESS NOTES
Chief Complaint   Patient presents with   • Hyperlipidemia   • Hypertension       Subjective   Marilynn Gil is a 83 y.o. female.     Hyperlipidemia   This is a chronic problem. Recent lipid tests were reviewed and are variable. She has no history of diabetes, hypothyroidism or obesity. Pertinent negatives include no chest pain, leg pain, myalgias or shortness of breath. Current antihyperlipidemic treatment includes statins. The current treatment provides moderate improvement of lipids. There are no compliance problems.    Hypertension   This is a chronic problem. The problem is controlled. Associated symptoms include palpitations (some). Pertinent negatives include no blurred vision, chest pain, orthopnea, peripheral edema, PND or shortness of breath. Current antihypertension treatment includes beta blockers, ACE inhibitors, diuretics and lifestyle changes. The current treatment provides moderate improvement. There are no compliance problems.  Hypertensive end-organ damage includes CAD/MI.    She has ongoing problems with neck pain.  Her neck is been bothering her since she had a fall in April 2018.  One of the spinous processes in her cervical spine was broken.  She wore a neck collar for several months.  A follow-up CT scan did not identify the fracture.  Other findings on the CT scan included cervical disc disease.    The following portions of the patient's history were reviewed and updated as appropriate: allergies, current medications, past family history, past medical history, past social history, past surgical history and problem list.    Review of Systems   Constitutional: Negative for appetite change.   HENT: Negative for nosebleeds.         Throat tickle sometimes   Eyes: Negative for blurred vision and double vision.   Respiratory: Negative for cough and shortness of breath.    Cardiovascular: Positive for palpitations (some). Negative for chest pain, orthopnea, leg swelling and PND.    Musculoskeletal: Negative for myalgias.   Neurological: Positive for dizziness. Negative for headache.         Current Outpatient Prescriptions:   •  aspirin 325 MG tablet, Take 325 mg by mouth daily., Disp: , Rfl:   •  dicyclomine (BENTYL) 10 MG capsule, Take 1 capsule by mouth 4 (Four) Times a Day Before Meals & at Bedtime As Needed (cramping)., Disp: 60 capsule, Rfl: 2  •  diphenoxylate-atropine (LOMOTIL) 2.5-0.025 MG per tablet, Take 1 tablet by mouth. 1-2 tablet every six hours prn diarrhea, Disp: , Rfl:   •  furosemide (LASIX) 20 MG tablet, 1 tablet daily., Disp: , Rfl: 3  •  isosorbide mononitrate (IMDUR) 60 MG 24 hr tablet, 1 tablet daily., Disp: , Rfl: 3  •  lisinopril (PRINIVIL,ZESTRIL) 5 MG tablet, Take 5 mg by mouth daily., Disp: , Rfl:   •  meclizine 25 MG chewable tablet chewable tablet, Chew 25 mg. 1 tablet every 6-8 hours PRN, Disp: , Rfl:   •  metoprolol tartrate (LOPRESSOR) 25 MG tablet, 1/2 tablet two times daily, Disp: , Rfl: 3  •  NITROSTAT 0.4 MG SL tablet, 1 tab sublingual every 5 minutes x 3 as needed for chest pressure, Disp: , Rfl: 0  •  ondansetron (ZOFRAN) 4 MG tablet, Take 1 tablet by mouth Every 8 (Eight) Hours As Needed for Nausea or Vomiting. 1 tablet every 4-6 hours as needed for nausea, Disp: 30 tablet, Rfl: 5  •  pantoprazole (PROTONIX) 40 MG EC tablet, Take 1 tablet by mouth Daily., Disp: 90 tablet, Rfl: 3  •  pentoxifylline (TRENTal) 400 MG CR tablet, 1 tablet 3 (three) times a day., Disp: , Rfl: 3  •  potassium chloride (K-DUR,KLOR-CON) 10 MEQ CR tablet, TAKE 1 TABLET BY MOUTH DAILY, Disp: 90 tablet, Rfl: 3  •  prasugrel (EFFIENT) tablet, Take 5 mg by mouth daily., Disp: , Rfl:   •  raNITIdine (ZANTAC) 300 MG tablet, Take 1 tablet by mouth Every Night., Disp: 30 tablet, Rfl: 5  •  simvastatin (ZOCOR) 10 MG tablet, TK 1 T PO D UTD, Disp: , Rfl: 3  •  zolpidem (AMBIEN) 10 MG tablet, TAKE 1/2 TO 1 TABLET BY MOUTH AT BEDTIME AS NEEDED FOR SLEEP, Disp: 90 tablet, Rfl:  "0        Objective     /78 (BP Location: Left arm, Patient Position: Sitting, Cuff Size: Adult)   Pulse 64   Ht 149.9 cm (59\")   Wt 44.9 kg (99 lb)   SpO2 95%   BMI 20.00 kg/m²     Physical Exam   Constitutional: She is oriented to person, place, and time. She appears well-developed and well-nourished. No distress.   Neck: Normal carotid pulses present. Carotid bruit is not present.   Cardiovascular: Regular rhythm, S1 normal and S2 normal.  Exam reveals no gallop and no friction rub.    No murmur heard.  Pulses:       Carotid pulses are 2+ on the right side, and 2+ on the left side.  Pulmonary/Chest: Effort normal and breath sounds normal. She has no wheezes. She has no rhonchi. She has no rales. Chest wall is not dull to percussion.   Musculoskeletal: She exhibits no edema.   Neurological: She is alert and oriented to person, place, and time.   Skin: Skin is warm and dry.   Nursing note and vitals reviewed.        Assessment/Plan   Marilynn was seen today for hyperlipidemia and hypertension.    Diagnoses and all orders for this visit:    Essential hypertension  -     Comprehensive Metabolic Panel; Future  -     Lipid Panel; Future  -     Comprehensive Metabolic Panel  -     Lipid Panel    Hypercholesterolemia    Cervical disc disease    Other orders  -     Fluzone High Dose =>65Years      We discussed her ongoing problems with neck pain extensively.  It may be related to disc disease, arthritis.  We discussed doing physical therapy.  She has problems with dizziness with position changes.  Advised her we could also do physical therapy for that.  However, she might have more dizziness throughout the treatment.  Therefore, she would like to hold off on physical therapy.  We discussed doing an MRI.  She is not sure she pursue epidural injections or surgical consultation.  Therefore, we will hold off on doing an MRI.  She will try over-the-counter topical treatments.         "

## 2018-11-07 ENCOUNTER — OFFICE VISIT (OUTPATIENT)
Dept: GASTROENTEROLOGY | Facility: CLINIC | Age: 83
End: 2018-11-07

## 2018-11-07 VITALS
BODY MASS INDEX: 19.76 KG/M2 | HEIGHT: 59 IN | WEIGHT: 98 LBS | SYSTOLIC BLOOD PRESSURE: 108 MMHG | TEMPERATURE: 98 F | DIASTOLIC BLOOD PRESSURE: 70 MMHG

## 2018-11-07 DIAGNOSIS — K21.9 GASTROESOPHAGEAL REFLUX DISEASE, ESOPHAGITIS PRESENCE NOT SPECIFIED: ICD-10-CM

## 2018-11-07 DIAGNOSIS — R13.10 DYSPHAGIA, UNSPECIFIED TYPE: Primary | ICD-10-CM

## 2018-11-07 DIAGNOSIS — K59.01 SLOW TRANSIT CONSTIPATION: ICD-10-CM

## 2018-11-07 PROCEDURE — 99213 OFFICE O/P EST LOW 20 MIN: CPT | Performed by: INTERNAL MEDICINE

## 2018-11-07 NOTE — PROGRESS NOTES
Chief Complaint   Patient presents with   • Difficulty Swallowing   • Diarrhea       Marilynn Gil is a  83 y.o. female here for a follow up visit for dysphagia and diarrhea.  When she was discharged from rehabilitation she weighed 90 pounds.  She currently weighs 98 pounds.  She reports that she's not drinking much because it makes her full.  Recent labs indicate an increase in her creatinine to 1.97.  She reports that she has multiple small bowel movements throughout the day with a sense of incomplete evacuation.  She feels like she has a lot of gas.  She sometimes has indigestion that limits the types of foods that she can eat.  It will often radiate to her back.  Her heartburn is fairly well controlled.  She takes pantoprazole in the morning and ranitidine at night.  She sees no blood in her stool.    She has a history of chronic pancreatitis and has chronic nausea baseline.  She also has esophageal dysmotility as well as some oropharyngeal component (noted on esophagramm 10/17) and has had difficulty tolerating diet at times.  She does not typically suffer from constipation.  Her weight had been fairly stable of late but certainly in the low range of normal.    HPI  Past Medical History:   Diagnosis Date   • Anxiety    • CAD in native artery    • Chronic kidney disease, stage 3 (CMS/HCC)    • GERD (gastroesophageal reflux disease)    • Hypercholesterolemia    • Hypertension    • Insomnia    • Mesenteric ischemia, chronic (CMS/HCC)    • Osteoporosis    • Pancreatitis    • Stroke (CMS/HCC)    • Venous thrombosis and embolism      Past Surgical History:   Procedure Laterality Date   • BACK SURGERY      x 4   • BREAST MASS EXCISION      Benign   • CATARACT EXTRACTION Bilateral    • CHOLECYSTECTOMY  08/2013   • COLONOSCOPY  12/2012    scarred mucosa,internal hemorrhoids   • COLONOSCOPY  09/12/2012    Patent side to side ileo colonic anastomosis, one 7mm polyp in the ascending colon, erythematous and vascular  pattern decreased mucosa in the transverse colon, scarred mucosa in the rectum, NBIH.  PATH: Tubular adenoma, Patchy mild chronic inflammation    • CORONARY ANGIOPLASTY      MI 1985, s/p angioplasty 1987   • ENDOSCOPY  12/06/2013    z line irreg 38cm from the incisors, tortuous esophagus, HH, gastritis, bilious gastric fluid, duodenitis.  PATH: Gastric mucosa with minimal chornic infalmmation and with histologic features suggestive of reactive gastropathy.    • PANCREAS SURGERY  05/2013    Pancreatic duct stricture, stent placed   • RECTAL PROLAPSE REPAIR  2007    Rectopexy and sigmoidectomy, s/p rectal prolapse repair x 2 prior to 2007   • SPHINCTEROTOMY  05/2013       Current Outpatient Prescriptions:   •  aspirin 325 MG tablet, Take 325 mg by mouth daily., Disp: , Rfl:   •  dicyclomine (BENTYL) 10 MG capsule, Take 1 capsule by mouth 4 (Four) Times a Day Before Meals & at Bedtime As Needed (cramping)., Disp: 60 capsule, Rfl: 2  •  diphenoxylate-atropine (LOMOTIL) 2.5-0.025 MG per tablet, Take 1 tablet by mouth. 1-2 tablet every six hours prn diarrhea, Disp: , Rfl:   •  furosemide (LASIX) 20 MG tablet, 1 tablet daily., Disp: , Rfl: 3  •  isosorbide mononitrate (IMDUR) 60 MG 24 hr tablet, 1 tablet daily., Disp: , Rfl: 3  •  lisinopril (PRINIVIL,ZESTRIL) 5 MG tablet, Take 5 mg by mouth daily., Disp: , Rfl:   •  meclizine 25 MG chewable tablet chewable tablet, Chew 25 mg. 1 tablet every 6-8 hours PRN, Disp: , Rfl:   •  metoprolol tartrate (LOPRESSOR) 25 MG tablet, 1/2 tablet two times daily, Disp: , Rfl: 3  •  NITROSTAT 0.4 MG SL tablet, 1 tab sublingual every 5 minutes x 3 as needed for chest pressure, Disp: , Rfl: 0  •  ondansetron (ZOFRAN) 4 MG tablet, Take 1 tablet by mouth Every 8 (Eight) Hours As Needed for Nausea or Vomiting. 1 tablet every 4-6 hours as needed for nausea, Disp: 30 tablet, Rfl: 5  •  pantoprazole (PROTONIX) 40 MG EC tablet, Take 1 tablet by mouth Daily., Disp: 90 tablet, Rfl: 3  •   pentoxifylline (TRENTal) 400 MG CR tablet, 1 tablet 3 (three) times a day., Disp: , Rfl: 3  •  potassium chloride (K-DUR,KLOR-CON) 10 MEQ CR tablet, TAKE 1 TABLET BY MOUTH DAILY, Disp: 90 tablet, Rfl: 3  •  prasugrel (EFFIENT) tablet, Take 5 mg by mouth daily., Disp: , Rfl:   •  raNITIdine (ZANTAC) 300 MG tablet, Take 1 tablet by mouth Every Night., Disp: 30 tablet, Rfl: 5  •  simvastatin (ZOCOR) 10 MG tablet, TK 1 T PO D UTD, Disp: , Rfl: 3  •  zolpidem (AMBIEN) 10 MG tablet, TAKE 1/2 TO 1 TABLET BY MOUTH AT BEDTIME AS NEEDED FOR SLEEP, Disp: 90 tablet, Rfl: 0  PRN Meds:.  Allergies   Allergen Reactions   • Codeine    • Other      Darvocet   • Shellfish-Derived Products    • Sulfa Antibiotics      Social History     Social History   • Marital status:      Spouse name: N/A   • Number of children: 2   • Years of education: N/A     Occupational History   • NOT EMPLOYED      Social History Main Topics   • Smoking status: Former Smoker     Packs/day: 2.00     Types: Cigarettes     Quit date: 1990   • Smokeless tobacco: Never Used   • Alcohol use No   • Drug use: No   • Sexual activity: Defer     Other Topics Concern   • Not on file     Social History Narrative    LIVES ALONE     Family History   Problem Relation Age of Onset   • Cancer Brother    • Pancreatitis Paternal Aunt    • Diabetes Father      Review of Systems   Constitutional: Positive for appetite change and fatigue. Negative for unexpected weight change.   HENT: Positive for trouble swallowing.    Gastrointestinal: Positive for constipation. Negative for blood in stool.   Musculoskeletal: Positive for neck pain.   Neurological: Positive for weakness.   All other systems reviewed and are negative.    Vitals:    11/07/18 1259   BP: 108/70   Temp: 98 °F (36.7 °C)     1    11/07/18  1259   Weight: 44.5 kg (98 lb)     Physical Exam   Constitutional: She appears well-developed and well-nourished.   HENT:   Head: Normocephalic and atraumatic.   Eyes: No  scleral icterus.   Pulmonary/Chest: Effort normal.   Abdominal: Soft. She exhibits no distension.   Neurological: She is alert.   Baseline tremor   Skin: Skin is warm and dry.   Psychiatric: She has a normal mood and affect.     No images are attached to the encounter.  Diagnoses and all orders for this visit:    Dysphagia, unspecified type  Comments:  Esophageal dysmotility and oropharyngeal component    Slow transit constipation    Gastroesophageal reflux disease, esophagitis presence not specified    Plan-  Add fiber supplement daily as well as stool softener    Discussed need to increase caloric intake as well as fluid volume daily.  She seems volume depleted and her recent labs indicate worsening kidney function.    Trial IB Theron for gas-if her constipation improves so will her gas.  We discussed this.    Despite her difficulties with swallowing she is able to eat fairly normal diet with some exceptions.  Recommended that she weighed herself weekly as she has no room to lose any additional weight.    She will call me in a few weeks with a progress report regarding her bowels and the changes we made today.

## 2018-11-12 DIAGNOSIS — G47.00 INSOMNIA: ICD-10-CM

## 2018-11-13 RX ORDER — ZOLPIDEM TARTRATE 10 MG/1
TABLET ORAL
Qty: 90 TABLET | Refills: 0 | OUTPATIENT
Start: 2018-11-13 | End: 2019-02-08 | Stop reason: SDUPTHER

## 2018-12-26 RX ORDER — POTASSIUM CHLORIDE 750 MG/1
TABLET, EXTENDED RELEASE ORAL
Qty: 90 TABLET | Refills: 0 | Status: SHIPPED | OUTPATIENT
Start: 2018-12-26 | End: 2019-03-26 | Stop reason: SDUPTHER

## 2019-02-08 DIAGNOSIS — G47.00 INSOMNIA: ICD-10-CM

## 2019-02-08 RX ORDER — ZOLPIDEM TARTRATE 10 MG/1
TABLET ORAL
Qty: 90 TABLET | Refills: 0 | OUTPATIENT
Start: 2019-02-08 | End: 2019-05-09 | Stop reason: SDUPTHER

## 2019-03-26 RX ORDER — POTASSIUM CHLORIDE 750 MG/1
TABLET, EXTENDED RELEASE ORAL
Qty: 90 TABLET | Refills: 0 | Status: SHIPPED | OUTPATIENT
Start: 2019-03-26 | End: 2019-06-25 | Stop reason: SDUPTHER

## 2019-05-02 ENCOUNTER — OFFICE VISIT (OUTPATIENT)
Dept: INTERNAL MEDICINE | Facility: CLINIC | Age: 84
End: 2019-05-02

## 2019-05-02 VITALS
BODY MASS INDEX: 20.16 KG/M2 | TEMPERATURE: 97.7 F | SYSTOLIC BLOOD PRESSURE: 112 MMHG | HEIGHT: 59 IN | WEIGHT: 100 LBS | DIASTOLIC BLOOD PRESSURE: 66 MMHG

## 2019-05-02 DIAGNOSIS — J21.9 ACUTE BRONCHIOLITIS DUE TO UNSPECIFIED ORGANISM: Primary | ICD-10-CM

## 2019-05-02 PROCEDURE — 99213 OFFICE O/P EST LOW 20 MIN: CPT | Performed by: INTERNAL MEDICINE

## 2019-05-02 RX ORDER — AMOXICILLIN 875 MG/1
875 TABLET, COATED ORAL 2 TIMES DAILY
Qty: 14 TABLET | Refills: 0 | Status: SHIPPED | OUTPATIENT
Start: 2019-05-02 | End: 2019-05-09

## 2019-05-02 NOTE — PROGRESS NOTES
Chief Complaint   Patient presents with   • URI     cough and congestion       Subjective   Marilynn Gil is a 84 y.o. female.     URI    This is a new problem. The current episode started in the past 7 days. The problem has been unchanged. There has been no fever. Associated symptoms include congestion (productive of green mucus), coughing, diarrhea (some one day), nausea, rhinorrhea, sinus pain and wheezing. Pertinent negatives include no chest pain, ear pain, headaches, sore throat, swollen glands or vomiting. Treatments tried: mucinex, delsym cough syrup. The treatment provided no relief.        The following portions of the patient's history were reviewed and updated as appropriate: allergies, current medications, past family history, past medical history, past social history, past surgical history and problem list.    Review of Systems   Constitutional: Positive for appetite change and diaphoresis (some in the am. not new). Negative for chills and fever.   HENT: Positive for congestion (productive of green mucus) and rhinorrhea. Negative for ear pain and sore throat.    Respiratory: Positive for cough and wheezing. Negative for shortness of breath.    Cardiovascular: Negative for chest pain, palpitations and leg swelling.   Gastrointestinal: Positive for diarrhea (some one day) and nausea. Negative for vomiting.         Current Outpatient Medications:   •  aspirin 325 MG tablet, Take 325 mg by mouth daily., Disp: , Rfl:   •  dicyclomine (BENTYL) 10 MG capsule, Take 1 capsule by mouth 4 (Four) Times a Day Before Meals & at Bedtime As Needed (cramping)., Disp: 60 capsule, Rfl: 2  •  diphenoxylate-atropine (LOMOTIL) 2.5-0.025 MG per tablet, Take 1 tablet by mouth. 1-2 tablet every six hours prn diarrhea, Disp: , Rfl:   •  furosemide (LASIX) 20 MG tablet, 1 tablet daily., Disp: , Rfl: 3  •  isosorbide mononitrate (IMDUR) 60 MG 24 hr tablet, 1 tablet daily., Disp: , Rfl: 3  •  lisinopril (PRINIVIL,ZESTRIL) 5 MG  "tablet, Take 5 mg by mouth daily., Disp: , Rfl:   •  meclizine 25 MG chewable tablet chewable tablet, Chew 25 mg. 1 tablet every 6-8 hours PRN, Disp: , Rfl:   •  metoprolol tartrate (LOPRESSOR) 25 MG tablet, 1/2 tablet two times daily, Disp: , Rfl: 3  •  NITROSTAT 0.4 MG SL tablet, 1 tab sublingual every 5 minutes x 3 as needed for chest pressure, Disp: , Rfl: 0  •  ondansetron (ZOFRAN) 4 MG tablet, Take 1 tablet by mouth Every 8 (Eight) Hours As Needed for Nausea or Vomiting. 1 tablet every 4-6 hours as needed for nausea, Disp: 30 tablet, Rfl: 5  •  pantoprazole (PROTONIX) 40 MG EC tablet, Take 1 tablet by mouth Daily., Disp: 90 tablet, Rfl: 3  •  pentoxifylline (TRENTal) 400 MG CR tablet, 1 tablet 3 (three) times a day., Disp: , Rfl: 3  •  potassium chloride (K-DUR,KLOR-CON) 10 MEQ CR tablet, TAKE 1 TABLET BY MOUTH DAILY, Disp: 90 tablet, Rfl: 0  •  prasugrel (EFFIENT) tablet, Take 5 mg by mouth daily., Disp: , Rfl:   •  raNITIdine (ZANTAC) 300 MG tablet, Take 1 tablet by mouth Every Night., Disp: 30 tablet, Rfl: 5  •  simvastatin (ZOCOR) 10 MG tablet, TK 1 T PO D UTD, Disp: , Rfl: 3  •  zolpidem (AMBIEN) 10 MG tablet, TAKE 1/2 TO 1 TABLET BY MOUTH AT BEDTIME AS NEEDED FOR SLEEP, Disp: 90 tablet, Rfl: 0  •  amoxicillin (AMOXIL) 875 MG tablet, Take 1 tablet by mouth 2 (Two) Times a Day for 7 days., Disp: 14 tablet, Rfl: 0  •  Fluticasone Furoate-Vilanterol (BREO ELLIPTA) 100-25 MCG/INH inhaler, Inhale 1 puff Daily., Disp: 1 each, Rfl: 0        Objective     /66   Temp 97.7 °F (36.5 °C)   Ht 149.9 cm (59\")   Wt 45.4 kg (100 lb)   BMI 20.20 kg/m²     Physical Exam   Constitutional: She is oriented to person, place, and time. She appears well-developed and well-nourished. No distress.   HENT:   Right Ear: Tympanic membrane and ear canal normal.   Left Ear: Tympanic membrane and ear canal normal.   Nose: Right sinus exhibits no maxillary sinus tenderness and no frontal sinus tenderness. Left sinus exhibits no " maxillary sinus tenderness and no frontal sinus tenderness.   Mouth/Throat: Oropharynx is clear and moist.   Cardiovascular: Normal rate and regular rhythm.   Pulmonary/Chest: Effort normal. She has wheezes. She has no rhonchi. She has no rales.   She coughs frequently.  Moist cough.   Musculoskeletal: She exhibits no edema.   Lymphadenopathy:     She has no cervical adenopathy.   Neurological: She is alert and oriented to person, place, and time.   Skin: Skin is warm and dry.   Nursing note and vitals reviewed.        Assessment/Plan   Marilynn was seen today for uri.    Diagnoses and all orders for this visit:    Acute bronchiolitis due to unspecified organism    Other orders  -     amoxicillin (AMOXIL) 875 MG tablet; Take 1 tablet by mouth 2 (Two) Times a Day for 7 days.  -     Fluticasone Furoate-Vilanterol (BREO ELLIPTA) 100-25 MCG/INH inhaler; Inhale 1 puff Daily.

## 2019-05-09 DIAGNOSIS — G47.00 INSOMNIA: ICD-10-CM

## 2019-05-10 RX ORDER — ZOLPIDEM TARTRATE 10 MG/1
TABLET ORAL
Qty: 90 TABLET | Refills: 0 | OUTPATIENT
Start: 2019-05-10 | End: 2019-08-05 | Stop reason: SDUPTHER

## 2019-06-14 ENCOUNTER — OFFICE VISIT (OUTPATIENT)
Dept: INTERNAL MEDICINE | Facility: CLINIC | Age: 84
End: 2019-06-14

## 2019-06-14 VITALS
WEIGHT: 99 LBS | OXYGEN SATURATION: 95 % | HEART RATE: 75 BPM | DIASTOLIC BLOOD PRESSURE: 62 MMHG | SYSTOLIC BLOOD PRESSURE: 126 MMHG | HEIGHT: 56 IN | BODY MASS INDEX: 22.27 KG/M2

## 2019-06-14 DIAGNOSIS — Z00.00 MEDICARE ANNUAL WELLNESS VISIT, SUBSEQUENT: Primary | ICD-10-CM

## 2019-06-14 DIAGNOSIS — E78.00 HYPERCHOLESTEROLEMIA: ICD-10-CM

## 2019-06-14 DIAGNOSIS — I10 ESSENTIAL HYPERTENSION: ICD-10-CM

## 2019-06-14 PROCEDURE — G0439 PPPS, SUBSEQ VISIT: HCPCS | Performed by: INTERNAL MEDICINE

## 2019-06-14 PROCEDURE — 99213 OFFICE O/P EST LOW 20 MIN: CPT | Performed by: INTERNAL MEDICINE

## 2019-06-14 RX ORDER — PANTOPRAZOLE SODIUM 40 MG/1
40 TABLET, DELAYED RELEASE ORAL DAILY
Qty: 90 TABLET | Refills: 3 | Status: SHIPPED | OUTPATIENT
Start: 2019-06-14 | End: 2019-10-31

## 2019-06-14 NOTE — PROGRESS NOTES
Chief Complaint   Patient presents with   • Medicare Wellness-subsequent   • Hypertension   • Hyperlipidemia       Subjective   Marilynn Gil is a 84 y.o. female.     Hypertension   This is a chronic problem. The problem is controlled. Associated symptoms include chest pain and palpitations. Pertinent negatives include no blurred vision, orthopnea, peripheral edema, PND or shortness of breath. Current antihypertension treatment includes ACE inhibitors, diuretics, beta blockers and lifestyle changes. The current treatment provides significant improvement. There are no compliance problems.  Hypertensive end-organ damage includes CAD/MI.   Hyperlipidemia   This is a chronic problem. Recent lipid tests were reviewed and are variable. She has no history of diabetes or obesity. Associated symptoms include chest pain. Pertinent negatives include no leg pain, myalgias or shortness of breath. Current antihyperlipidemic treatment includes statins and diet change. The current treatment provides mild improvement of lipids. There are no compliance problems.         The following portions of the patient's history were reviewed and updated as appropriate: allergies, current medications, past family history, past medical history, past social history, past surgical history and problem list.    Review of Systems   Constitutional: Negative for appetite change.   HENT: Negative for nosebleeds.    Eyes: Negative for blurred vision and double vision.   Respiratory: Negative for cough and shortness of breath.    Cardiovascular: Positive for chest pain and palpitations. Negative for orthopnea, leg swelling and PND.   Gastrointestinal: Positive for diarrhea, nausea and indigestion.   Musculoskeletal: Negative for myalgias.   Hematological: Bruises/bleeds easily.         Current Outpatient Medications:   •  aspirin 325 MG tablet, Take 325 mg by mouth daily., Disp: , Rfl:   •  BREO ELLIPTA 100-25 MCG/INH inhaler, INHALE 1 PUFF BY MOUTH  "DAILY, Disp: 60 each, Rfl: 4  •  dicyclomine (BENTYL) 10 MG capsule, Take 1 capsule by mouth 4 (Four) Times a Day Before Meals & at Bedtime As Needed (cramping)., Disp: 60 capsule, Rfl: 2  •  diphenoxylate-atropine (LOMOTIL) 2.5-0.025 MG per tablet, Take 1 tablet by mouth. 1-2 tablet every six hours prn diarrhea, Disp: , Rfl:   •  furosemide (LASIX) 20 MG tablet, 1 tablet daily., Disp: , Rfl: 3  •  isosorbide mononitrate (IMDUR) 60 MG 24 hr tablet, 1 tablet daily., Disp: , Rfl: 3  •  lisinopril (PRINIVIL,ZESTRIL) 5 MG tablet, Take 5 mg by mouth daily., Disp: , Rfl:   •  meclizine 25 MG chewable tablet chewable tablet, Chew 25 mg. 1 tablet every 6-8 hours PRN, Disp: , Rfl:   •  metoprolol tartrate (LOPRESSOR) 25 MG tablet, 1/2 tablet two times daily, Disp: , Rfl: 3  •  NITROSTAT 0.4 MG SL tablet, 1 tab sublingual every 5 minutes x 3 as needed for chest pressure, Disp: , Rfl: 0  •  ondansetron (ZOFRAN) 4 MG tablet, Take 1 tablet by mouth Every 8 (Eight) Hours As Needed for Nausea or Vomiting. 1 tablet every 4-6 hours as needed for nausea, Disp: 30 tablet, Rfl: 5  •  pantoprazole (PROTONIX) 40 MG EC tablet, Take 1 tablet by mouth Daily., Disp: 90 tablet, Rfl: 3  •  pentoxifylline (TRENTal) 400 MG CR tablet, 1 tablet 3 (three) times a day., Disp: , Rfl: 3  •  potassium chloride (K-DUR,KLOR-CON) 10 MEQ CR tablet, TAKE 1 TABLET BY MOUTH DAILY, Disp: 90 tablet, Rfl: 0  •  prasugrel (EFFIENT) tablet, Take 5 mg by mouth daily., Disp: , Rfl:   •  simvastatin (ZOCOR) 10 MG tablet, TK 1 T PO D UTD, Disp: , Rfl: 3  •  zolpidem (AMBIEN) 10 MG tablet, TAKE 1/2 TO 1 TABLET BY MOUTH EVERY NIGHT AT BEDTIME AS NEEDED FOR SLEEP, Disp: 90 tablet, Rfl: 0        Objective     /62   Pulse 75   Ht 142.2 cm (56\")   Wt 44.9 kg (99 lb)   SpO2 95%   BMI 22.20 kg/m²     Physical Exam   Constitutional: She is oriented to person, place, and time. She appears well-developed and well-nourished. No distress.   Neck: Normal carotid pulses " present. Carotid bruit is not present.   Cardiovascular: Regular rhythm, S1 normal and S2 normal. Exam reveals no gallop and no friction rub.   No murmur heard.  Pulses:       Carotid pulses are 2+ on the right side, and 2+ on the left side.  Pulmonary/Chest: Effort normal and breath sounds normal. She has no wheezes. She has no rhonchi. She has no rales. Chest wall is not dull to percussion.   Musculoskeletal: She exhibits no edema.   Neurological: She is alert and oriented to person, place, and time.   Skin: Skin is warm and dry.   Nursing note and vitals reviewed.        Assessment/Plan   Marilynn was seen today for medicare wellness-subsequent, hypertension and hyperlipidemia.    Diagnoses and all orders for this visit:    Medicare annual wellness visit, subsequent    Essential hypertension  -     CBC (No Diff); Future  -     Comprehensive Metabolic Panel; Future  -     Lipid Panel; Future    Hypercholesterolemia    Other orders  -     pantoprazole (PROTONIX) 40 MG EC tablet; Take 1 tablet by mouth Daily.

## 2019-06-14 NOTE — PROGRESS NOTES
QUICK REFERENCE INFORMATION:  The ABCs of the Annual Wellness Visit    Subsequent Medicare Wellness Visit     HEALTH RISK ASSESSMENT    : 1935    Recent Hospitalizations:  No hospitalization(s) within the last year..  ccc      Current Medical Providers:  Patient Care Team:  Joycelyn Zapata MD as PCP - General  Joycelyn Zapata MD as PCP - Family Medicine  [facility] - [facility] Attributed        Smoking Status:  Social History     Tobacco Use   Smoking Status Former Smoker   • Packs/day: 2.00   • Types: Cigarettes   • Last attempt to quit:    • Years since quittin.4   Smokeless Tobacco Never Used       Alcohol Consumption:  Social History     Substance and Sexual Activity   Alcohol Use No       Depression Screen:   PHQ-2/PHQ-9 Depression Screening 2019   Little interest or pleasure in doing things 0   Feeling down, depressed, or hopeless 0   Trouble falling or staying asleep, or sleeping too much 0   Feeling tired or having little energy 1   Poor appetite or overeating 1   Feeling bad about yourself - or that you are a failure or have let yourself or your family down 0   Trouble concentrating on things, such as reading the newspaper or watching television 0   Moving or speaking so slowly that other people could have noticed. Or the opposite - being so fidgety or restless that you have been moving around a lot more than usual 0   Thoughts that you would be better off dead, or of hurting yourself in some way 0   Total Score 2   If you checked off any problems, how difficult have these problems made it for you to do your work, take care of things at home, or get along with other people? Not difficult at all       Health Habits and Functional and Cognitive Screening:  Functional & Cognitive Status 2019   Do you have difficulty preparing food and eating? No   Do you have difficulty bathing yourself, getting dressed or grooming yourself? No   Do you have difficulty using the toilet?  No   Do you have difficulty moving around from place to place? No   Do you have trouble with steps or getting out of a bed or a chair? No   In the past year have you fallen or experienced a near fall? No   Current Diet Well Balanced Diet   Dental Exam Up to date   Eye Exam Up to date   Exercise (times per week) 7 times per week   Current Exercise Activities Include Walking   Do you need help using the phone?  No   Are you deaf or do you have serious difficulty hearing?  No   Do you need help with transportation? No   Do you need help shopping? No   Do you need help preparing meals?  No   Do you need help with housework?  No   Do you need help with laundry? No   Do you need help taking your medications? No   Do you need help managing money? No   Do you ever drive or ride in a car without wearing a seat belt? No   Have you felt unusual stress, anger or loneliness in the last month? No   Who do you live with? Alone   If you need help, do you have trouble finding someone available to you? No   Have you been bothered in the last four weeks by sexual problems? No   Do you have difficulty concentrating, remembering or making decisions? No           Does the patient have evidence of cognitive impairment? No    Asiprin use counseling: Taking ASA appropriately as indicated      Recent Lab Results:    Lab Results   Component Value Date    GLU 96 03/21/2016        Lab Results   Component Value Date    CHOL 249 (H) 10/02/2018    TRIG 223 (H) 10/02/2018    HDL 63 (H) 10/02/2018    VLDL 44.6 (H) 10/02/2018    LDLHDL 2.24 10/02/2018           Age-appropriate Screening Schedule:  Refer to the list below for future screening recommendations based on patient's age, sex and/or medical conditions. Orders for these recommended tests are listed in the plan section. The patient has been provided with a written plan.    Health Maintenance   Topic Date Due   • ZOSTER VACCINE (1 of 2) 03/23/1987   • INFLUENZA VACCINE  08/01/2019   • LIPID  PANEL  10/02/2019   • TDAP/TD VACCINES (2 - Td) 04/05/2028   • PNEUMOCOCCAL VACCINES (65+ LOW/MEDIUM RISK)  Completed   • MAMMOGRAM  Discontinued   • DXA SCAN  Discontinued        Subjective   History of Present Illness    Marilynn Gil is a 84 y.o. female who presents for an Annual Wellness Visit.    The following portions of the patient's history were reviewed and updated as appropriate: allergies, current medications, past family history, past medical history, past social history, past surgical history and problem list.    Outpatient Medications Prior to Visit   Medication Sig Dispense Refill   • aspirin 325 MG tablet Take 325 mg by mouth daily.     • BREO ELLIPTA 100-25 MCG/INH inhaler INHALE 1 PUFF BY MOUTH DAILY 60 each 4   • dicyclomine (BENTYL) 10 MG capsule Take 1 capsule by mouth 4 (Four) Times a Day Before Meals & at Bedtime As Needed (cramping). 60 capsule 2   • diphenoxylate-atropine (LOMOTIL) 2.5-0.025 MG per tablet Take 1 tablet by mouth. 1-2 tablet every six hours prn diarrhea     • furosemide (LASIX) 20 MG tablet 1 tablet daily.  3   • isosorbide mononitrate (IMDUR) 60 MG 24 hr tablet 1 tablet daily.  3   • lisinopril (PRINIVIL,ZESTRIL) 5 MG tablet Take 5 mg by mouth daily.     • meclizine 25 MG chewable tablet chewable tablet Chew 25 mg. 1 tablet every 6-8 hours PRN     • metoprolol tartrate (LOPRESSOR) 25 MG tablet 1/2 tablet two times daily  3   • NITROSTAT 0.4 MG SL tablet 1 tab sublingual every 5 minutes x 3 as needed for chest pressure  0   • ondansetron (ZOFRAN) 4 MG tablet Take 1 tablet by mouth Every 8 (Eight) Hours As Needed for Nausea or Vomiting. 1 tablet every 4-6 hours as needed for nausea 30 tablet 5   • pentoxifylline (TRENTal) 400 MG CR tablet 1 tablet 3 (three) times a day.  3   • potassium chloride (K-DUR,KLOR-CON) 10 MEQ CR tablet TAKE 1 TABLET BY MOUTH DAILY 90 tablet 0   • prasugrel (EFFIENT) tablet Take 5 mg by mouth daily.     • simvastatin (ZOCOR) 10 MG tablet TK 1 T PO  "D UTD  3   • zolpidem (AMBIEN) 10 MG tablet TAKE 1/2 TO 1 TABLET BY MOUTH EVERY NIGHT AT BEDTIME AS NEEDED FOR SLEEP 90 tablet 0   • pantoprazole (PROTONIX) 40 MG EC tablet Take 1 tablet by mouth Daily. 90 tablet 3   • raNITIdine (ZANTAC) 300 MG tablet Take 1 tablet by mouth Every Night. 30 tablet 5     No facility-administered medications prior to visit.        Patient Active Problem List   Diagnosis   • Chronic recurrent pancreatitis (CMS/HCC)   • Hypertension   • Hypercholesterolemia   • CAD in native artery   • Chronic kidney disease, stage 3 (CMS/HCC)   • Insomnia   • Dysphagia   • Polyneuropathy   • Essential tremor       Advance Care Planning:  Patient has an advance directive - a copy has not been provided. Have asked the patient to send this to us to add to record    Identification of Risk Factors:  Risk factors include: cardiovascular risk and polypharmacy.    Review of Systems    Compared to one year ago, the patient feels her physical health is the same.  Compared to one year ago, the patient feels her mental health is the same.    Objective     Physical Exam    Vitals:    06/14/19 1346   BP: 126/62   Pulse: 75   SpO2: 95%   Weight: 44.9 kg (99 lb)   Height: 142.2 cm (56\")       Patient's Body mass index is 22.2 kg/m². BMI is within normal parameters. No follow-up required..      Assessment/Plan   Patient Self-Management and Personalized Health Advice  The patient has been provided with information about: diet, exercise, weight management and prevention of cardiac or vascular disease and preventive services including:   · She declines mammogram and bone density.  Recommended she discuss Shingrix with her pharmacy..    Visit Diagnoses:  No diagnosis found.    No orders of the defined types were placed in this encounter.      Outpatient Encounter Medications as of 6/14/2019   Medication Sig Dispense Refill   • aspirin 325 MG tablet Take 325 mg by mouth daily.     • BREO ELLIPTA 100-25 MCG/INH inhaler " INHALE 1 PUFF BY MOUTH DAILY 60 each 4   • dicyclomine (BENTYL) 10 MG capsule Take 1 capsule by mouth 4 (Four) Times a Day Before Meals & at Bedtime As Needed (cramping). 60 capsule 2   • diphenoxylate-atropine (LOMOTIL) 2.5-0.025 MG per tablet Take 1 tablet by mouth. 1-2 tablet every six hours prn diarrhea     • furosemide (LASIX) 20 MG tablet 1 tablet daily.  3   • isosorbide mononitrate (IMDUR) 60 MG 24 hr tablet 1 tablet daily.  3   • lisinopril (PRINIVIL,ZESTRIL) 5 MG tablet Take 5 mg by mouth daily.     • meclizine 25 MG chewable tablet chewable tablet Chew 25 mg. 1 tablet every 6-8 hours PRN     • metoprolol tartrate (LOPRESSOR) 25 MG tablet 1/2 tablet two times daily  3   • NITROSTAT 0.4 MG SL tablet 1 tab sublingual every 5 minutes x 3 as needed for chest pressure  0   • ondansetron (ZOFRAN) 4 MG tablet Take 1 tablet by mouth Every 8 (Eight) Hours As Needed for Nausea or Vomiting. 1 tablet every 4-6 hours as needed for nausea 30 tablet 5   • pantoprazole (PROTONIX) 40 MG EC tablet Take 1 tablet by mouth Daily. 90 tablet 3   • pentoxifylline (TRENTal) 400 MG CR tablet 1 tablet 3 (three) times a day.  3   • potassium chloride (K-DUR,KLOR-CON) 10 MEQ CR tablet TAKE 1 TABLET BY MOUTH DAILY 90 tablet 0   • prasugrel (EFFIENT) tablet Take 5 mg by mouth daily.     • simvastatin (ZOCOR) 10 MG tablet TK 1 T PO D UTD  3   • zolpidem (AMBIEN) 10 MG tablet TAKE 1/2 TO 1 TABLET BY MOUTH EVERY NIGHT AT BEDTIME AS NEEDED FOR SLEEP 90 tablet 0   • [DISCONTINUED] pantoprazole (PROTONIX) 40 MG EC tablet Take 1 tablet by mouth Daily. 90 tablet 3   • [DISCONTINUED] raNITIdine (ZANTAC) 300 MG tablet Take 1 tablet by mouth Every Night. 30 tablet 5     No facility-administered encounter medications on file as of 6/14/2019.        Reviewed use of high risk medication in the elderly: yes  Reviewed for potential of harmful drug interactions in the elderly: yes    Follow Up:  No Follow-up on file.     An After Visit Summary and PPPS  with all of these plans were given to the patient.

## 2019-06-14 NOTE — PATIENT INSTRUCTIONS
Medicare Wellness  Personal Prevention Plan of Service     Date of Office Visit:  2019  Encounter Provider:  Joycelyn Zapata MD  Place of Service:  [facility]  Patient Name: Marilynn Gil  :  1935    As part of the Medicare Wellness portion of your visit today, we are providing you with this personalized preventive plan of services (PPPS). This plan is based upon recommendations of the [place] Preventive Services Task Force (USPSTF) and the Advisory Committee on Immunization Practices (ACIP).    This lists the preventive care services that should be considered, and provides dates of when you are due. Items listed as completed are up-to-date and do not require any further intervention.    Health Maintenance   Topic Date Due   • ZOSTER VACCINE (1 of 2) 1987   • MEDICARE ANNUAL WELLNESS  01/10/2019   • INFLUENZA VACCINE  2019   • LIPID PANEL  10/02/2019   • TDAP/TD VACCINES (2 - Td) 2028   • PNEUMOCOCCAL VACCINES (65+ LOW/MEDIUM RISK)  Completed   • MAMMOGRAM  Discontinued   • DXA SCAN  Discontinued       Orders Placed This Encounter   Procedures   • CBC (No Diff)     Standing Status:   Future     Standing Expiration Date:   2020   • Comprehensive Metabolic Panel     Standing Status:   Future   • Lipid Panel     Standing Status:   Future     Standing Expiration Date:   2020       Return in about 4 months (around 10/14/2019) for Follow up, Fasting.        Check with your pharmacy about the new shingles vaccine (zoster)  and a tetanus shot .    Please send us a copy of your living will.    Medicare Wellness  Personal Prevention Plan of Service     Date of Office Visit:  2019  Encounter Provider:  Joycelyn Zapata MD  Place of Service:  [facility]  Patient Name: Marilynn Gil  :  1935    As part of the Medicare Wellness portion of your visit today, we are providing you with this  personalized preventive plan of services (PPPS). This plan is based upon recommendations of the [place] Preventive Services Task Force (USPSTF) and the Advisory Committee on Immunization Practices (ACIP).    This lists the preventive care services that should be considered, and provides dates of when you are due. Items listed as completed are up-to-date and do not require any further intervention.    Health Maintenance   Topic Date Due   • ZOSTER VACCINE (1 of 2) 03/23/1987   • MEDICARE ANNUAL WELLNESS  01/10/2019   • INFLUENZA VACCINE  08/01/2019   • LIPID PANEL  10/02/2019   • TDAP/TD VACCINES (2 - Td) 04/05/2028   • PNEUMOCOCCAL VACCINES (65+ LOW/MEDIUM RISK)  Completed   • MAMMOGRAM  Discontinued   • DXA SCAN  Discontinued       Orders Placed This Encounter   Procedures   • CBC (No Diff)     Standing Status:   Future     Standing Expiration Date:   6/13/2020   • Comprehensive Metabolic Panel     Standing Status:   Future   • Lipid Panel     Standing Status:   Future     Standing Expiration Date:   6/13/2020       Return in about 4 months (around 10/14/2019) for Follow up, Fasting.

## 2019-06-25 ENCOUNTER — LAB (OUTPATIENT)
Dept: INTERNAL MEDICINE | Facility: CLINIC | Age: 84
End: 2019-06-25

## 2019-06-25 DIAGNOSIS — I10 ESSENTIAL HYPERTENSION: ICD-10-CM

## 2019-06-25 LAB
ALBUMIN SERPL-MCNC: 4.1 G/DL (ref 3.5–5.2)
ALBUMIN/GLOB SERPL: 1.4 G/DL
ALP SERPL-CCNC: 132 U/L (ref 39–117)
ALT SERPL-CCNC: 7 U/L (ref 1–33)
AST SERPL-CCNC: 15 U/L (ref 1–32)
BILIRUB SERPL-MCNC: 0.3 MG/DL (ref 0.2–1.2)
BUN SERPL-MCNC: 46 MG/DL (ref 8–23)
BUN/CREAT SERPL: 18.8 (ref 7–25)
CALCIUM SERPL-MCNC: 9.6 MG/DL (ref 8.6–10.5)
CHLORIDE SERPL-SCNC: 103 MMOL/L (ref 98–107)
CHOLEST SERPL-MCNC: 222 MG/DL (ref 0–200)
CO2 SERPL-SCNC: 25.9 MMOL/L (ref 22–29)
CREAT SERPL-MCNC: 2.45 MG/DL (ref 0.57–1)
DEPRECATED RDW RBC AUTO: 44.7 FL (ref 37–54)
ERYTHROCYTE [DISTWIDTH] IN BLOOD BY AUTOMATED COUNT: 13.5 % (ref 12.3–15.4)
GLOBULIN SER CALC-MCNC: 3 GM/DL
GLUCOSE SERPL-MCNC: 115 MG/DL (ref 65–99)
HCT VFR BLD AUTO: 42.2 % (ref 34–46.6)
HDLC SERPL-MCNC: 52 MG/DL (ref 40–60)
HGB BLD-MCNC: 13.8 G/DL (ref 12–15.9)
LDLC SERPL CALC-MCNC: 124 MG/DL (ref 0–100)
MCH RBC QN AUTO: 30.2 PG (ref 26.6–33)
MCHC RBC AUTO-ENTMCNC: 32.7 G/DL (ref 31.5–35.7)
MCV RBC AUTO: 92.3 FL (ref 79–97)
PLATELET # BLD AUTO: 217 10*3/MM3 (ref 140–450)
PMV BLD AUTO: 8.9 FL (ref 6–12)
POTASSIUM SERPL-SCNC: 4.9 MMOL/L (ref 3.5–5.2)
PROT SERPL-MCNC: 7.1 G/DL (ref 6–8.5)
RBC # BLD AUTO: 4.57 10*6/MM3 (ref 3.77–5.28)
SODIUM SERPL-SCNC: 142 MMOL/L (ref 136–145)
TRIGL SERPL-MCNC: 232 MG/DL (ref 0–150)
VLDLC SERPL-MCNC: 46.4 MG/DL
WBC NRBC COR # BLD: 6.79 10*3/MM3 (ref 3.4–10.8)

## 2019-06-25 PROCEDURE — 85027 COMPLETE CBC AUTOMATED: CPT | Performed by: INTERNAL MEDICINE

## 2019-06-25 RX ORDER — POTASSIUM CHLORIDE 750 MG/1
TABLET, EXTENDED RELEASE ORAL
Qty: 90 TABLET | Refills: 1 | Status: SHIPPED | OUTPATIENT
Start: 2019-06-25 | End: 2019-12-23

## 2019-06-26 DIAGNOSIS — N18.9 CHRONIC KIDNEY DISEASE, UNSPECIFIED CKD STAGE: Primary | ICD-10-CM

## 2019-07-10 ENCOUNTER — LAB (OUTPATIENT)
Dept: INTERNAL MEDICINE | Facility: CLINIC | Age: 84
End: 2019-07-10

## 2019-07-10 DIAGNOSIS — N18.9 CHRONIC KIDNEY DISEASE, UNSPECIFIED CKD STAGE: ICD-10-CM

## 2019-07-10 LAB
ANION GAP SERPL CALCULATED.3IONS-SCNC: 12.6 MMOL/L (ref 5–15)
BUN BLD-MCNC: 33 MG/DL (ref 8–23)
BUN/CREAT SERPL: 15.6 (ref 7–25)
CALCIUM SPEC-SCNC: 9.3 MG/DL (ref 8.6–10.5)
CHLORIDE SERPL-SCNC: 103 MMOL/L (ref 98–107)
CO2 SERPL-SCNC: 24.4 MMOL/L (ref 22–29)
CREAT BLD-MCNC: 2.12 MG/DL (ref 0.57–1)
GFR SERPL CREATININE-BSD FRML MDRD: 22 ML/MIN/1.73
GLUCOSE BLD-MCNC: 135 MG/DL (ref 65–99)
POTASSIUM BLD-SCNC: 4.3 MMOL/L (ref 3.5–5.2)
SODIUM BLD-SCNC: 140 MMOL/L (ref 136–145)

## 2019-07-10 PROCEDURE — 36415 COLL VENOUS BLD VENIPUNCTURE: CPT | Performed by: INTERNAL MEDICINE

## 2019-07-10 PROCEDURE — 80048 BASIC METABOLIC PNL TOTAL CA: CPT | Performed by: INTERNAL MEDICINE

## 2019-08-05 DIAGNOSIS — G47.00 INSOMNIA: ICD-10-CM

## 2019-08-06 RX ORDER — ZOLPIDEM TARTRATE 10 MG/1
TABLET ORAL
Qty: 90 TABLET | Refills: 0 | OUTPATIENT
Start: 2019-08-06 | End: 2019-11-01 | Stop reason: SDUPTHER

## 2019-10-31 ENCOUNTER — OFFICE VISIT (OUTPATIENT)
Dept: INTERNAL MEDICINE | Facility: CLINIC | Age: 84
End: 2019-10-31

## 2019-10-31 VITALS
BODY MASS INDEX: 22.5 KG/M2 | SYSTOLIC BLOOD PRESSURE: 132 MMHG | WEIGHT: 100 LBS | HEIGHT: 56 IN | DIASTOLIC BLOOD PRESSURE: 80 MMHG

## 2019-10-31 DIAGNOSIS — E78.00 HYPERCHOLESTEROLEMIA: ICD-10-CM

## 2019-10-31 DIAGNOSIS — R73.01 ELEVATED FASTING GLUCOSE: ICD-10-CM

## 2019-10-31 DIAGNOSIS — N18.30 CHRONIC KIDNEY DISEASE, STAGE 3 (HCC): ICD-10-CM

## 2019-10-31 DIAGNOSIS — I10 ESSENTIAL HYPERTENSION: Primary | ICD-10-CM

## 2019-10-31 LAB
ALBUMIN SERPL-MCNC: 4.1 G/DL (ref 3.5–5.2)
ALBUMIN/GLOB SERPL: 1.1 G/DL
ALP SERPL-CCNC: 126 U/L (ref 39–117)
ALT SERPL-CCNC: 6 U/L (ref 1–33)
AST SERPL-CCNC: 19 U/L (ref 1–32)
BILIRUB SERPL-MCNC: 0.3 MG/DL (ref 0.2–1.2)
BUN SERPL-MCNC: 28 MG/DL (ref 8–23)
BUN/CREAT SERPL: 14.7 (ref 7–25)
CALCIUM SERPL-MCNC: 9.2 MG/DL (ref 8.6–10.5)
CHLORIDE SERPL-SCNC: 100 MMOL/L (ref 98–107)
CHOLEST SERPL-MCNC: 209 MG/DL (ref 0–200)
CO2 SERPL-SCNC: 24.1 MMOL/L (ref 22–29)
CREAT SERPL-MCNC: 1.91 MG/DL (ref 0.57–1)
GLOBULIN SER CALC-MCNC: 3.6 GM/DL
GLUCOSE SERPL-MCNC: 83 MG/DL (ref 65–99)
HBA1C MFR BLD: 6.1 % (ref 4.8–5.6)
HDLC SERPL-MCNC: 41 MG/DL (ref 40–60)
LDLC SERPL CALC-MCNC: 124 MG/DL (ref 0–100)
POTASSIUM SERPL-SCNC: 4.5 MMOL/L (ref 3.5–5.2)
PROT SERPL-MCNC: 7.7 G/DL (ref 6–8.5)
SODIUM SERPL-SCNC: 138 MMOL/L (ref 136–145)
TRIGL SERPL-MCNC: 221 MG/DL (ref 0–150)
VLDLC SERPL-MCNC: 44.2 MG/DL

## 2019-10-31 PROCEDURE — 99214 OFFICE O/P EST MOD 30 MIN: CPT | Performed by: INTERNAL MEDICINE

## 2019-10-31 NOTE — PROGRESS NOTES
Chief Complaint   Patient presents with   • Hyperlipidemia   • Hypertension       Subjective   Marilynn Gil is a 84 y.o. female.     Hyperlipidemia   This is a chronic problem. The problem is controlled. Recent lipid tests were reviewed and are high. She has no history of diabetes or obesity. Factors aggravating her hyperlipidemia include beta blockers. Pertinent negatives include no chest pain or shortness of breath. Current antihyperlipidemic treatment includes statins and diet change. The current treatment provides moderate improvement of lipids. There are no compliance problems.    Hypertension   This is a chronic problem. The problem is controlled. Pertinent negatives include no blurred vision, chest pain, headaches, orthopnea, palpitations, peripheral edema, PND or shortness of breath. There are no associated agents to hypertension. Current antihypertension treatment includes ACE inhibitors, diuretics and beta blockers. The current treatment provides significant improvement. There are no compliance problems.  Hypertensive end-organ damage includes kidney disease and CAD/MI.       Chronic kidney disease: She has chronic kidney disease.  Her creatinine recently has been higher than usual for her.  She has not noticed any change in urinary pattern.    Cough: This is a new problem.  Duration 2 weeks.  Course constant.  Initially, she had a sore throat in addition to the cough.  The sore throat is better.  She has tried Delsym cough syrup.  It has not been helping.  She coughs up a little bit of yellow phlegm from time to time.  No fevers, chills, sweats.  No earache.  No unusual shortness of breath.  She has had a little bit of wheezing.    The following portions of the patient's history were reviewed and updated as appropriate: allergies, current medications, past family history, past medical history, past social history, past surgical history and problem list.    Review of Systems   Constitutional: Negative  for appetite change.   HENT: Negative for ear pain, nosebleeds, sinus pressure, sore throat, swollen glands and trouble swallowing.    Eyes: Negative for blurred vision and double vision.   Respiratory: Positive for cough and wheezing (a little). Negative for shortness of breath.    Cardiovascular: Negative for chest pain, palpitations, orthopnea, leg swelling and PND.         Current Outpatient Medications:   •  aspirin 325 MG tablet, Take 325 mg by mouth daily., Disp: , Rfl:   •  BREO ELLIPTA 100-25 MCG/INH inhaler, INHALE 1 PUFF BY MOUTH DAILY, Disp: 60 each, Rfl: 4  •  dicyclomine (BENTYL) 10 MG capsule, Take 1 capsule by mouth 4 (Four) Times a Day Before Meals & at Bedtime As Needed (cramping)., Disp: 60 capsule, Rfl: 2  •  diphenoxylate-atropine (LOMOTIL) 2.5-0.025 MG per tablet, Take 1 tablet by mouth. 1-2 tablet every six hours prn diarrhea, Disp: , Rfl:   •  furosemide (LASIX) 20 MG tablet, 1 tablet daily., Disp: , Rfl: 3  •  isosorbide mononitrate (IMDUR) 60 MG 24 hr tablet, 1 tablet daily., Disp: , Rfl: 3  •  lisinopril (PRINIVIL,ZESTRIL) 5 MG tablet, Take 5 mg by mouth daily., Disp: , Rfl:   •  meclizine 25 MG chewable tablet chewable tablet, Chew 25 mg. 1 tablet every 6-8 hours PRN, Disp: , Rfl:   •  metoprolol tartrate (LOPRESSOR) 25 MG tablet, 1/2 tablet two times daily, Disp: , Rfl: 3  •  NITROSTAT 0.4 MG SL tablet, 1 tab sublingual every 5 minutes x 3 as needed for chest pressure, Disp: , Rfl: 0  •  ondansetron (ZOFRAN) 4 MG tablet, Take 1 tablet by mouth Every 8 (Eight) Hours As Needed for Nausea or Vomiting. 1 tablet every 4-6 hours as needed for nausea, Disp: 30 tablet, Rfl: 5  •  pentoxifylline (TRENTal) 400 MG CR tablet, 1 tablet 3 (three) times a day., Disp: , Rfl: 3  •  potassium chloride (K-DUR,KLOR-CON) 10 MEQ CR tablet, TAKE 1 TABLET BY MOUTH DAILY, Disp: 90 tablet, Rfl: 1  •  prasugrel (EFFIENT) tablet, Take 5 mg by mouth daily., Disp: , Rfl:   •  simvastatin (ZOCOR) 10 MG tablet, TK 1 T  "PO D UTD, Disp: , Rfl: 3  •  zolpidem (AMBIEN) 10 MG tablet, TAKE 1/2 TO 1 TABLET BY MOUTH EVERY NIGHT AT BEDTIME AS NEEDED FOR SLEEP, Disp: 90 tablet, Rfl: 0        Objective     /80 (BP Location: Right arm, Patient Position: Sitting, Cuff Size: Adult)   Ht 142.2 cm (56\")   Wt 45.4 kg (100 lb)   BMI 22.42 kg/m²     Physical Exam   Constitutional: She is oriented to person, place, and time. She appears well-developed and well-nourished. No distress.   Neck: Normal carotid pulses present. Carotid bruit is not present.   Cardiovascular: Regular rhythm, S1 normal and S2 normal. Exam reveals no gallop and no friction rub.   No murmur heard.  Pulses:       Carotid pulses are 2+ on the right side, and 2+ on the left side.  Pulmonary/Chest: Effort normal and breath sounds normal. She has no wheezes. She has no rhonchi. She has no rales. Chest wall is not dull to percussion.   Cough present   Musculoskeletal: She exhibits no edema.   Neurological: She is alert and oriented to person, place, and time.   Skin: Skin is warm and dry.   Nursing note and vitals reviewed.        Assessment/Plan   Marilynn was seen today for hyperlipidemia and hypertension.    Diagnoses and all orders for this visit:    Essential hypertension  -     Comprehensive Metabolic Panel; Future  -     Lipid Panel; Future    Hypercholesterolemia    Chronic kidney disease, stage 3 (CMS/HCC)    Elevated fasting glucose  -     Hemoglobin A1c; Future      She has amoxicillin at home.  This was prescribed for dental problem.  She did not need to take it.  Advised her she could take it.  Presumably, she has had at least a 7-day supply.  She will let me know if she does not.  She reports her tremor is worse.  Advised her she could take metoprolol twice a day.  Discussed chronic kidney disease.  It is possible pantoprazole might be contributing to reduced GFR.  She will stop pantoprazole.  Advised her she could try over-the-counter Pepcid if needed for " heartburn.

## 2019-10-31 NOTE — PATIENT INSTRUCTIONS
OK to take amoxicillin for the cough.    Continue Delsym.      Stop pantoprazole.  If you have problems with heartburn, you may try over the counter pepcid.

## 2019-11-01 DIAGNOSIS — G47.00 INSOMNIA: ICD-10-CM

## 2019-11-01 RX ORDER — ZOLPIDEM TARTRATE 10 MG/1
TABLET ORAL
Qty: 90 TABLET | Refills: 0 | OUTPATIENT
Start: 2019-11-01 | End: 2020-02-07

## 2019-12-19 ENCOUNTER — TELEPHONE (OUTPATIENT)
Dept: INTERNAL MEDICINE | Facility: CLINIC | Age: 84
End: 2019-12-19

## 2019-12-19 NOTE — TELEPHONE ENCOUNTER
Pt called stating she still has a cough with a lot of mucus, she would like to know if she can get a prescription for the symptoms.   Fariha Senior confirmed    Call pt to advise  [phone]

## 2019-12-19 NOTE — TELEPHONE ENCOUNTER
Patient states she has had this cough since her last office visit which was 10-31-19. She states she had continued taking cough medicine but still has cough. Coughing as we were talking on the phone sounds pretty bad.

## 2019-12-23 RX ORDER — POTASSIUM CHLORIDE 750 MG/1
TABLET, EXTENDED RELEASE ORAL
Qty: 90 TABLET | Refills: 1 | Status: SHIPPED | OUTPATIENT
Start: 2019-12-23 | End: 2020-07-08

## 2020-01-07 ENCOUNTER — OFFICE VISIT (OUTPATIENT)
Dept: INTERNAL MEDICINE | Facility: CLINIC | Age: 85
End: 2020-01-07

## 2020-01-07 ENCOUNTER — APPOINTMENT (OUTPATIENT)
Dept: WOMENS IMAGING | Facility: HOSPITAL | Age: 85
End: 2020-01-07

## 2020-01-07 VITALS
DIASTOLIC BLOOD PRESSURE: 62 MMHG | WEIGHT: 98 LBS | HEART RATE: 70 BPM | BODY MASS INDEX: 22.04 KG/M2 | SYSTOLIC BLOOD PRESSURE: 124 MMHG | OXYGEN SATURATION: 91 % | HEIGHT: 56 IN

## 2020-01-07 DIAGNOSIS — M54.2 NECK PAIN: Primary | ICD-10-CM

## 2020-01-07 PROCEDURE — 72050 X-RAY EXAM NECK SPINE 4/5VWS: CPT | Performed by: INTERNAL MEDICINE

## 2020-01-07 PROCEDURE — 72050 X-RAY EXAM NECK SPINE 4/5VWS: CPT | Performed by: RADIOLOGY

## 2020-01-07 PROCEDURE — 99213 OFFICE O/P EST LOW 20 MIN: CPT | Performed by: INTERNAL MEDICINE

## 2020-01-07 NOTE — PROGRESS NOTES
Chief Complaint   Patient presents with   • Numbness     tingling and numbness in back of head   • Dizziness       Subjective   Marilynn Gil is a 84 y.o. female.     History of Present Illness     She comes in for evaluation of tingling and numbness at the back of her head.  This is been going on for a couple of months.  It is persistent.  Constant.  No better, no worse.  She has not noticed any specific aggravating factors or relieving factors.  She has not noticed any arm weakness, no tingling or numbness in her arms.  The sensation bothers her at night.  However, she is able to sleep using her sleeping pill.  She has also been feeling dizzy.  No fainting.  No falls.  She has history of fracture of the C5 spinous process in the past.    Tremor: She has problems with an essential tremor.  It seems to be getting worse.    The following portions of the patient's history were reviewed and updated as appropriate: allergies, current medications, past family history, past medical history, past social history, past surgical history and problem list.    Review of Systems   Constitutional: Negative for appetite change and fever.   Eyes: Negative for blurred vision and double vision.   Respiratory: Negative for cough and shortness of breath.    Musculoskeletal: Negative for gait problem.   Neurological: Negative for headache.         Current Outpatient Medications:   •  aspirin 325 MG tablet, Take 325 mg by mouth daily., Disp: , Rfl:   •  BREO ELLIPTA 100-25 MCG/INH inhaler, INHALE 1 PUFF BY MOUTH DAILY, Disp: 60 each, Rfl: 4  •  dicyclomine (BENTYL) 10 MG capsule, Take 1 capsule by mouth 4 (Four) Times a Day Before Meals & at Bedtime As Needed (cramping)., Disp: 60 capsule, Rfl: 2  •  diphenoxylate-atropine (LOMOTIL) 2.5-0.025 MG per tablet, Take 1 tablet by mouth. 1-2 tablet every six hours prn diarrhea, Disp: , Rfl:   •  furosemide (LASIX) 20 MG tablet, 1 tablet daily., Disp: , Rfl: 3  •  isosorbide mononitrate  "(IMDUR) 60 MG 24 hr tablet, 1 tablet daily., Disp: , Rfl: 3  •  lisinopril (PRINIVIL,ZESTRIL) 5 MG tablet, Take 5 mg by mouth daily., Disp: , Rfl:   •  meclizine 25 MG chewable tablet chewable tablet, Chew 25 mg. 1 tablet every 6-8 hours PRN, Disp: , Rfl:   •  metoprolol tartrate (LOPRESSOR) 25 MG tablet, 2 (Two) Times a Day. 1/2 tablet two times daily, Disp: , Rfl: 3  •  NITROSTAT 0.4 MG SL tablet, 1 tab sublingual every 5 minutes x 3 as needed for chest pressure, Disp: , Rfl: 0  •  ondansetron (ZOFRAN) 4 MG tablet, Take 1 tablet by mouth Every 8 (Eight) Hours As Needed for Nausea or Vomiting. 1 tablet every 4-6 hours as needed for nausea, Disp: 30 tablet, Rfl: 5  •  pentoxifylline (TRENTal) 400 MG CR tablet, 1 tablet 3 (three) times a day., Disp: , Rfl: 3  •  potassium chloride (K-DUR,KLOR-CON) 10 MEQ CR tablet, TAKE 1 TABLET BY MOUTH DAILY, Disp: 90 tablet, Rfl: 1  •  prasugrel (EFFIENT) tablet, Take 5 mg by mouth daily., Disp: , Rfl:   •  simvastatin (ZOCOR) 10 MG tablet, TK 1 T PO D UTD, Disp: , Rfl: 3  •  zolpidem (AMBIEN) 10 MG tablet, TAKE 1/2 TO 1 TABLET BY MOUTH EVERY NIGHT AT BEDTIME, Disp: 90 tablet, Rfl: 0        Objective     /62   Pulse 70   Ht 142.2 cm (56\")   Wt 44.5 kg (98 lb)   SpO2 91%   BMI 21.97 kg/m²     Physical Exam   Constitutional: She appears well-developed and well-nourished. No distress.   Eyes: EOM are normal.   Cardiovascular: Normal rate, regular rhythm and normal heart sounds. Exam reveals no gallop and no friction rub.   No murmur heard.  Pulmonary/Chest: Effort normal and breath sounds normal. No respiratory distress.   Musculoskeletal:        Cervical back: She exhibits decreased range of motion. She exhibits no tenderness.   Neurological:   Reflex Scores:       Bicep reflexes are 1+ on the right side and 1+ on the left side.   strength is good bilaterally.   Nursing note and vitals reviewed.        Assessment/Plan   Marilynn was seen today for numbness and " dizziness.    Diagnoses and all orders for this visit:    Neck pain  -     XR Spine Cervical Complete 4 or 5 View (In Office)      For tremor, will try increasing metoprolol.  She will take a half a tablet in the morning as usual.  Increase her evening dose from 1/2 tablet to a full tablet.  Advised her she could try heat on her neck to see if that would help with her symptoms.  In the past, she has taken gabapentin and Lyrica for polyneuropathy.  She did not tolerate either 1 of these.

## 2020-01-09 ENCOUNTER — TELEPHONE (OUTPATIENT)
Dept: INTERNAL MEDICINE | Facility: CLINIC | Age: 85
End: 2020-01-09

## 2020-01-09 NOTE — TELEPHONE ENCOUNTER
PT called to check the status of the results for her recent imaging (Cervical spine Xray, completed 1/7/2020). PT states she typically receives her results ASAP, but it's been a couple days and she hasn't heard anything.   PT's results are still being viewed by radiology at [facility].     Please call PT when her results are finalized: [phone]

## 2020-01-09 NOTE — TELEPHONE ENCOUNTER
Called pt to let her know that her that we have not received resultback yet on her xrays.    GRAZYNA Dudley(JAYMIE)(M),AYANNAT

## 2020-02-06 DIAGNOSIS — G47.00 INSOMNIA: ICD-10-CM

## 2020-02-07 RX ORDER — ZOLPIDEM TARTRATE 10 MG/1
TABLET ORAL
Qty: 90 TABLET | Refills: 0 | OUTPATIENT
Start: 2020-02-07 | End: 2020-05-07

## 2020-03-02 ENCOUNTER — OFFICE VISIT (OUTPATIENT)
Dept: INTERNAL MEDICINE | Facility: CLINIC | Age: 85
End: 2020-03-02

## 2020-03-02 VITALS
WEIGHT: 98 LBS | HEIGHT: 56 IN | DIASTOLIC BLOOD PRESSURE: 62 MMHG | OXYGEN SATURATION: 94 % | SYSTOLIC BLOOD PRESSURE: 118 MMHG | BODY MASS INDEX: 22.04 KG/M2 | HEART RATE: 75 BPM

## 2020-03-02 DIAGNOSIS — R82.90 ABNORMAL URINE FINDINGS: ICD-10-CM

## 2020-03-02 DIAGNOSIS — I10 ESSENTIAL HYPERTENSION: Primary | ICD-10-CM

## 2020-03-02 DIAGNOSIS — N18.30 CHRONIC KIDNEY DISEASE, STAGE 3 (HCC): ICD-10-CM

## 2020-03-02 DIAGNOSIS — R73.01 ELEVATED FASTING GLUCOSE: ICD-10-CM

## 2020-03-02 DIAGNOSIS — E78.00 HYPERCHOLESTEROLEMIA: ICD-10-CM

## 2020-03-02 LAB
ALBUMIN SERPL-MCNC: 4.2 G/DL (ref 3.5–5.2)
ALBUMIN/GLOB SERPL: 1.4 G/DL
ALP SERPL-CCNC: 138 U/L (ref 39–117)
ALT SERPL-CCNC: 10 U/L (ref 1–33)
AST SERPL-CCNC: 22 U/L (ref 1–32)
BACTERIA UR QL AUTO: ABNORMAL /HPF
BILIRUB SERPL-MCNC: 0.4 MG/DL (ref 0.2–1.2)
BILIRUB UR QL STRIP: NEGATIVE
BUN SERPL-MCNC: 30 MG/DL (ref 8–23)
BUN/CREAT SERPL: 16 (ref 7–25)
CALCIUM SERPL-MCNC: 9.4 MG/DL (ref 8.6–10.5)
CHLORIDE SERPL-SCNC: 102 MMOL/L (ref 98–107)
CHOLEST SERPL-MCNC: 220 MG/DL (ref 0–200)
CLARITY UR: ABNORMAL
CO2 SERPL-SCNC: 27.4 MMOL/L (ref 22–29)
COLOR UR: YELLOW
CREAT SERPL-MCNC: 1.88 MG/DL (ref 0.57–1)
ERYTHROCYTE [DISTWIDTH] IN BLOOD BY AUTOMATED COUNT: 13.9 % (ref 12.3–15.4)
GLOBULIN SER CALC-MCNC: 2.9 GM/DL
GLUCOSE SERPL-MCNC: 113 MG/DL (ref 65–99)
GLUCOSE UR STRIP-MCNC: NEGATIVE MG/DL
HBA1C MFR BLD: 6 % (ref 4.8–5.6)
HCT VFR BLD AUTO: 43.4 % (ref 34–46.6)
HDLC SERPL-MCNC: 57 MG/DL (ref 40–60)
HGB BLD-MCNC: 14 G/DL (ref 12–15.9)
HGB UR QL STRIP.AUTO: ABNORMAL
HYALINE CASTS UR QL AUTO: ABNORMAL /LPF
KETONES UR QL STRIP: NEGATIVE
LDLC SERPL CALC-MCNC: 123 MG/DL (ref 0–100)
LEUKOCYTE ESTERASE UR QL STRIP.AUTO: ABNORMAL
MCH RBC QN AUTO: 29.1 PG (ref 26.6–33)
MCHC RBC AUTO-ENTMCNC: 32.3 G/DL (ref 31.5–35.7)
MCV RBC AUTO: 90.2 FL (ref 79–97)
MUCOUS THREADS URNS QL MICRO: ABNORMAL /HPF
NITRITE UR QL STRIP: NEGATIVE
PH UR STRIP.AUTO: 5.5 [PH] (ref 5–8)
PLATELET # BLD AUTO: 200 10*3/MM3 (ref 140–450)
POTASSIUM SERPL-SCNC: 5 MMOL/L (ref 3.5–5.2)
PROT SERPL-MCNC: 7.1 G/DL (ref 6–8.5)
PROT UR QL STRIP: ABNORMAL
RBC # BLD AUTO: 4.81 10*6/MM3 (ref 3.77–5.28)
RBC # UR: ABNORMAL /HPF
REF LAB TEST METHOD: ABNORMAL
SODIUM SERPL-SCNC: 141 MMOL/L (ref 136–145)
SP GR UR STRIP: >=1.03 (ref 1–1.03)
SQUAMOUS #/AREA URNS HPF: ABNORMAL /HPF
TRIGL SERPL-MCNC: 198 MG/DL (ref 0–150)
UROBILINOGEN UR QL STRIP: ABNORMAL
VLDLC SERPL CALC-MCNC: 39.6 MG/DL
WBC # BLD AUTO: 8.31 10*3/MM3 (ref 3.4–10.8)
WBC UR QL AUTO: ABNORMAL /HPF

## 2020-03-02 PROCEDURE — 81001 URINALYSIS AUTO W/SCOPE: CPT | Performed by: INTERNAL MEDICINE

## 2020-03-02 PROCEDURE — 99214 OFFICE O/P EST MOD 30 MIN: CPT | Performed by: INTERNAL MEDICINE

## 2020-03-02 NOTE — PROGRESS NOTES
Chief Complaint   Patient presents with   • Hyperlipidemia     4 month follow up   • Hypertension       Subjective   Marilynn Gil is a 84 y.o. female.     Hyperlipidemia   Associated symptoms include chest pain (sometimes, has not had to use NTG.). Pertinent negatives include no shortness of breath.   Hypertension   Associated symptoms include chest pain (sometimes, has not had to use NTG.). Pertinent negatives include no blurred vision, palpitations or shortness of breath.      Hyperlipidemia:   This is a chronic problem.  No management changes were made at her last appointment when her cholesterol levels were as follows:  Lab Results   Component Value Date    CHOL 249 (H) 10/02/2018    CHLPL 209 (H) 10/31/2019    TRIG 221 (H) 10/31/2019    HDL 41 10/31/2019     (H) 10/31/2019   She continues to take simvastatin.  She tries to follow a prudent diet.  She has not had statin induced myalgias.  She has not had elevated AST or ALT.    Hypertension: This is a chronic problem.  She has not had recent problems with headaches, confusion, vision changes.  Her blood pressures have generally been controlled.  She does have coronary artery disease and at times has some chest pain.  This does not last long and she has not had to take nitroglycerin.  She denies PND, orthopnea, edema, syncope or near syncope.  Current treatment includes an ACE inhibitor, beta-blocker, diuretic.  By report, there is good compliance with treatment, good tolerance of treatment and good symptom control.     Coronary artery disease: This is a chronic problem.  She follows up regularly with cardiology.    Chronic kidney disease: Recent creatinine was improved.    Neuropathy: She continues to have problems with neuropathy.  She also has degenerative changes in her cervical spine which causes her neck to hurt.  Her symptoms have not progressed but she is bothered with them considerably.  She has previously taken gabapentin and Lyrica.  She  did not tolerate either 1 of these medications.    Urinary symptoms: This is a fairly new problem.  She feels she has to strain to urinate.  It burns a little bit when she urinates.  She does not feel she always empties her bladder completely.  She has not had nocturia.  Her urine does not look different.  She has not had nocturia.  No incontinence.  She may not drink very much fluid throughout the day.    Pre-diabetes/Elevated fasting glucose:  This is a chronic problem.  Patient has been advised to follow prudent diet, avoid sugar, exercise regularly.  She denies polyuria, polydipsia, vision change appetite change, unexplained weight loss.   Lab Results   Component Value Date    HGBA1C 6.10 (H) 10/31/2019          The following portions of the patient's history were reviewed and updated as appropriate: allergies, current medications, past family history, past medical history, past social history, past surgical history and problem list.    Review of Systems   Constitutional: Negative for appetite change.   HENT: Negative for nosebleeds.    Eyes: Negative for blurred vision and double vision.   Respiratory: Negative for cough and shortness of breath.    Cardiovascular: Positive for chest pain (sometimes, has not had to use NTG.). Negative for palpitations and leg swelling.   Endocrine: Negative for polydipsia and polyuria.         Current Outpatient Medications:   •  aspirin 325 MG tablet, Take 325 mg by mouth daily., Disp: , Rfl:   •  BREO ELLIPTA 100-25 MCG/INH inhaler, INHALE 1 PUFF BY MOUTH DAILY, Disp: 60 each, Rfl: 4  •  dicyclomine (BENTYL) 10 MG capsule, Take 1 capsule by mouth 4 (Four) Times a Day Before Meals & at Bedtime As Needed (cramping)., Disp: 60 capsule, Rfl: 2  •  diphenoxylate-atropine (LOMOTIL) 2.5-0.025 MG per tablet, Take 1 tablet by mouth. 1-2 tablet every six hours prn diarrhea, Disp: , Rfl:   •  furosemide (LASIX) 20 MG tablet, 1 tablet daily., Disp: , Rfl: 3  •  isosorbide mononitrate  "(IMDUR) 60 MG 24 hr tablet, 1 tablet daily., Disp: , Rfl: 3  •  lisinopril (PRINIVIL,ZESTRIL) 5 MG tablet, Take 5 mg by mouth daily., Disp: , Rfl:   •  meclizine 25 MG chewable tablet chewable tablet, Chew 25 mg. 1 tablet every 6-8 hours PRN, Disp: , Rfl:   •  metoprolol tartrate (LOPRESSOR) 25 MG tablet, 2 (Two) Times a Day. 1/2 tablet two times daily, Disp: , Rfl: 3  •  NITROSTAT 0.4 MG SL tablet, 1 tab sublingual every 5 minutes x 3 as needed for chest pressure, Disp: , Rfl: 0  •  ondansetron (ZOFRAN) 4 MG tablet, Take 1 tablet by mouth Every 8 (Eight) Hours As Needed for Nausea or Vomiting. 1 tablet every 4-6 hours as needed for nausea, Disp: 30 tablet, Rfl: 5  •  pentoxifylline (TRENTal) 400 MG CR tablet, 1 tablet 3 (three) times a day., Disp: , Rfl: 3  •  potassium chloride (K-DUR,KLOR-CON) 10 MEQ CR tablet, TAKE 1 TABLET BY MOUTH DAILY, Disp: 90 tablet, Rfl: 1  •  prasugrel (EFFIENT) tablet, Take 5 mg by mouth daily., Disp: , Rfl:   •  simvastatin (ZOCOR) 10 MG tablet, TK 1 T PO D UTD, Disp: , Rfl: 3  •  zolpidem (AMBIEN) 10 MG tablet, TAKE 1/2 TO 1 TABLET BY MOUTH EVERY NIGHT AT BEDTIME, Disp: 90 tablet, Rfl: 0        Objective     /62   Pulse 75   Ht 142.2 cm (56\")   Wt 44.5 kg (98 lb)   SpO2 94%   BMI 21.97 kg/m²     Physical Exam   Constitutional: She is oriented to person, place, and time. She appears well-developed and well-nourished. No distress.   Neck: Normal carotid pulses present. Carotid bruit is not present.   Cardiovascular: Regular rhythm, S1 normal and S2 normal. Exam reveals no gallop and no friction rub.   No murmur heard.  Pulses:       Carotid pulses are 2+ on the right side, and 2+ on the left side.  Pulmonary/Chest: Effort normal and breath sounds normal. She has no wheezes. She has no rhonchi. She has no rales. Chest wall is not dull to percussion.   Abdominal: There is no CVA tenderness.   Musculoskeletal: She exhibits no edema.   Neurological: She is alert and oriented to " person, place, and time.   Skin: Skin is warm and dry.   Nursing note and vitals reviewed.        Assessment/Plan   Marilynn was seen today for hyperlipidemia and hypertension.    Diagnoses and all orders for this visit:    Essential hypertension  -     Comprehensive Metabolic Panel  -     CBC (No Diff)  -     Lipid Panel  -     Urinalysis With Culture If Indicated - Urine, Clean Catch    Hypercholesterolemia    Chronic kidney disease, stage 3 (CMS/HCC)    Elevated fasting glucose  -     Hemoglobin A1c

## 2020-03-03 DIAGNOSIS — N18.4 CHRONIC KIDNEY DISEASE, STAGE 4 (SEVERE) (HCC): Primary | ICD-10-CM

## 2020-03-05 LAB
BACTERIA UR CULT: ABNORMAL
BACTERIA UR CULT: ABNORMAL
OTHER ANTIBIOTIC SUSC ISLT: ABNORMAL

## 2020-05-06 DIAGNOSIS — G47.00 INSOMNIA: ICD-10-CM

## 2020-05-07 RX ORDER — ZOLPIDEM TARTRATE 10 MG/1
TABLET ORAL
Qty: 90 TABLET | Refills: 0 | Status: SHIPPED | OUTPATIENT
Start: 2020-05-07 | End: 2020-08-03

## 2020-07-08 RX ORDER — FUROSEMIDE 20 MG/1
TABLET ORAL
Qty: 90 TABLET | Refills: 1 | Status: SHIPPED | OUTPATIENT
Start: 2020-07-08 | End: 2020-12-30

## 2020-07-08 RX ORDER — POTASSIUM CHLORIDE 750 MG/1
TABLET, EXTENDED RELEASE ORAL
Qty: 90 TABLET | Refills: 1 | Status: SHIPPED | OUTPATIENT
Start: 2020-07-08 | End: 2020-12-30

## 2020-08-03 DIAGNOSIS — G47.00 INSOMNIA: ICD-10-CM

## 2020-08-03 RX ORDER — ZOLPIDEM TARTRATE 10 MG/1
TABLET ORAL
Qty: 90 TABLET | Refills: 0 | Status: SHIPPED | OUTPATIENT
Start: 2020-08-03 | End: 2020-10-28

## 2020-08-12 RX ORDER — PANTOPRAZOLE SODIUM 40 MG/1
40 TABLET, DELAYED RELEASE ORAL DAILY
Qty: 90 TABLET | Refills: 3 | Status: SHIPPED | OUTPATIENT
Start: 2020-08-12 | End: 2021-01-26 | Stop reason: SDUPTHER

## 2020-10-27 DIAGNOSIS — G47.00 INSOMNIA: ICD-10-CM

## 2020-10-28 RX ORDER — ZOLPIDEM TARTRATE 10 MG/1
TABLET ORAL
Qty: 90 TABLET | Refills: 3 | Status: SHIPPED | OUTPATIENT
Start: 2020-10-28 | End: 2021-04-30 | Stop reason: SDUPTHER

## 2020-12-30 RX ORDER — FUROSEMIDE 20 MG/1
TABLET ORAL
Qty: 90 TABLET | Refills: 1 | Status: SHIPPED | OUTPATIENT
Start: 2020-12-30 | End: 2021-07-09

## 2020-12-30 RX ORDER — POTASSIUM CHLORIDE 750 MG/1
TABLET, EXTENDED RELEASE ORAL
Qty: 90 TABLET | Refills: 1 | Status: SHIPPED | OUTPATIENT
Start: 2020-12-30 | End: 2021-07-09

## 2021-01-26 ENCOUNTER — OFFICE VISIT (OUTPATIENT)
Dept: INTERNAL MEDICINE | Facility: CLINIC | Age: 86
End: 2021-01-26

## 2021-01-26 DIAGNOSIS — Z00.00 MEDICARE ANNUAL WELLNESS VISIT, SUBSEQUENT: Primary | ICD-10-CM

## 2021-01-26 PROCEDURE — G0439 PPPS, SUBSEQ VISIT: HCPCS | Performed by: INTERNAL MEDICINE

## 2021-01-26 RX ORDER — PANTOPRAZOLE SODIUM 40 MG/1
40 TABLET, DELAYED RELEASE ORAL DAILY
Qty: 90 TABLET | Refills: 3 | Status: SHIPPED | OUTPATIENT
Start: 2021-01-26 | End: 2021-11-23

## 2021-01-26 NOTE — PROGRESS NOTES
The ABCs of the Annual Wellness Visit  Subsequent Medicare Wellness Visit    Chief Complaint   Patient presents with   • Medicare Wellness-subsequent     You have chosen to receive care through a telephone visit. Do you consent to use a telephone visit for your medical care today? Yes       Subjective   History of Present Illness:  Marilynn Gil is a 85 y.o. female who presents for a Subsequent Medicare Wellness Visit.    HEALTH RISK ASSESSMENT    Recent Hospitalizations:  No hospitalization(s) within the last year.    Current Medical Providers:  Patient Care Team:  Joycelyn Zapata MD as PCP - General  Joycelyn Zapata MD as PCP - Family Medicine    Smoking Status:  Social History     Tobacco Use   Smoking Status Former Smoker   • Packs/day: 2.00   • Types: Cigarettes   • Quit date:    • Years since quittin.0   Smokeless Tobacco Never Used       Alcohol Consumption:  Social History     Substance and Sexual Activity   Alcohol Use No       Depression Screen:   PHQ-2/PHQ-9 Depression Screening 2021   Little interest or pleasure in doing things 0   Feeling down, depressed, or hopeless 0   Trouble falling or staying asleep, or sleeping too much 0   Feeling tired or having little energy 1   Poor appetite or overeating 1   Feeling bad about yourself - or that you are a failure or have let yourself or your family down 0   Trouble concentrating on things, such as reading the newspaper or watching television 0   Moving or speaking so slowly that other people could have noticed. Or the opposite - being so fidgety or restless that you have been moving around a lot more than usual 0   Thoughts that you would be better off dead, or of hurting yourself in some way 0   Total Score 2   If you checked off any problems, how difficult have these problems made it for you to do your work, take care of things at home, or get along with other people? -       Fall Risk Screen:  STEADI Fall Risk Assessment was completed, and  patient is at LOW risk for falls.Assessment completed on:1/26/2021    Health Habits and Functional and Cognitive Screening:  Functional & Cognitive Status 1/26/2021   Do you have difficulty preparing food and eating? No   Do you have difficulty bathing yourself, getting dressed or grooming yourself? No   Do you have difficulty using the toilet? No   Do you have difficulty moving around from place to place? No   Do you have trouble with steps or getting out of a bed or a chair? No   Current Diet Well Balanced Diet   Dental Exam Up to date   Eye Exam Not up to date   Exercise (times per week) 7 times per week   Current Exercises Include Walking   Current Exercise Activities Include -   Do you need help using the phone?  No   Are you deaf or do you have serious difficulty hearing?  No   Do you need help with transportation? No   Do you need help shopping? No   Do you need help preparing meals?  No   Do you need help with housework?  No   Do you need help with laundry? No   Do you need help taking your medications? No   Do you need help managing money? No   Do you ever drive or ride in a car without wearing a seat belt? No   Have you felt unusual stress, anger or loneliness in the last month? No   Who do you live with? Alone   If you need help, do you have trouble finding someone available to you? No   Have you been bothered in the last four weeks by sexual problems? No   Do you have difficulty concentrating, remembering or making decisions? No   Her granddaughter helps her a lot with shopping, house chores.  However, it she needed to, she could take care of things      Does the patient have evidence of cognitive impairment? No    Asprin use counseling:Taking ASA appropriately as indicated    Age-appropriate Screening Schedule:  Refer to the list below for future screening recommendations based on patient's age, sex and/or medical conditions. Orders for these recommended tests are listed in the plan section. The patient  has been provided with a written plan.    Health Maintenance   Topic Date Due   • ZOSTER VACCINE (1 of 2) 03/23/1987   • LIPID PANEL  03/02/2021   • TDAP/TD VACCINES (2 - Td) 04/05/2028   • INFLUENZA VACCINE  Completed   • MAMMOGRAM  Discontinued   • DXA SCAN  Discontinued          The following portions of the patient's history were reviewed and updated as appropriate: allergies, current medications, past family history, past medical history, past social history, past surgical history and problem list.    Outpatient Medications Prior to Visit   Medication Sig Dispense Refill   • aspirin 325 MG tablet Take 325 mg by mouth daily.     • BREO ELLIPTA 100-25 MCG/INH inhaler INHALE 1 PUFF BY MOUTH DAILY 60 each 4   • dicyclomine (BENTYL) 10 MG capsule Take 1 capsule by mouth 4 (Four) Times a Day Before Meals & at Bedtime As Needed (cramping). 60 capsule 2   • diphenoxylate-atropine (LOMOTIL) 2.5-0.025 MG per tablet Take 1 tablet by mouth. 1-2 tablet every six hours prn diarrhea     • furosemide (LASIX) 20 MG tablet TAKE 1 TABLET BY MOUTH DAILY 90 tablet 1   • isosorbide mononitrate (IMDUR) 60 MG 24 hr tablet 1 tablet daily.  3   • lisinopril (PRINIVIL,ZESTRIL) 5 MG tablet Take 5 mg by mouth daily.     • meclizine 25 MG chewable tablet chewable tablet Chew 25 mg. 1 tablet every 6-8 hours PRN     • NITROSTAT 0.4 MG SL tablet 1 tab sublingual every 5 minutes x 3 as needed for chest pressure  0   • ondansetron (ZOFRAN) 4 MG tablet Take 1 tablet by mouth Every 8 (Eight) Hours As Needed for Nausea or Vomiting. 1 tablet every 4-6 hours as needed for nausea 30 tablet 5   • pentoxifylline (TRENTal) 400 MG CR tablet 1 tablet 3 (three) times a day.  3   • potassium chloride (K-DUR,KLOR-CON) 10 MEQ CR tablet TAKE 1 TABLET BY MOUTH DAILY 90 tablet 1   • prasugrel (EFFIENT) tablet Take 5 mg by mouth daily.     • simvastatin (ZOCOR) 10 MG tablet TK 1 T PO D UTD  3   • zolpidem (AMBIEN) 10 MG tablet TAKE 1/2 TO 1 TABLET BY MOUTH AT  BEDTIME AS NEEDED 90 tablet 3   • metoprolol tartrate (LOPRESSOR) 25 MG tablet 2 (Two) Times a Day. 1/2 tablet two times daily  3   • pantoprazole (PROTONIX) 40 MG EC tablet TAKE 1 TABLET BY MOUTH DAILY 90 tablet 3     No facility-administered medications prior to visit.        Patient Active Problem List   Diagnosis   • Chronic recurrent pancreatitis (CMS/HCC)   • Hypertension   • Hypercholesterolemia   • CAD in native artery   • Chronic kidney disease, stage 3 (CMS/HCC)   • Insomnia   • Dysphagia   • Polyneuropathy   • Essential tremor       Advanced Care Planning:  ACP discussion was held with the patient during this visit. Patient has an advance directive (not in EMR), copy requested.    Review of Systems    Compared to one year ago, the patient feels her physical health is the same.  Compared to one year ago, the patient feels her mental health is the same.    Reviewed chart for potential of high risk medication in the elderly: yes  Reviewed chart for potential of harmful drug interactions in the elderly:yes    Objective         Vitals:    01/26/21 1315   PainSc: 0-No pain   No height, weight or blood pressure taken for this telephone visit.  Reviewed vitals from last visit. She states her weight has not changed.    There is no height or weight on file to calculate BMI.  Discussed the patient's BMI with her. The BMI is in the acceptable range.  BMI from last office visit discussed.   Physical Exam          Assessment/Plan   Medicare Risks and Personalized Health Plan  CMS Preventative Services Quick Reference  Advance Directive Discussion  Immunizations Discussed/Encouraged (specific immunizations; Shingrix )    The above risks/problems have been discussed with the patient.  Pertinent information has been shared with the patient in the After Visit Summary.  Follow up plans and orders are seen below in the Assessment/Plan Section.    Diagnoses and all orders for this visit:    1. Medicare annual wellness visit,  subsequent (Primary)    Other orders  -     pantoprazole (PROTONIX) 40 MG EC tablet; Take 1 tablet by mouth Daily.  Dispense: 90 tablet; Refill: 3  -     metoprolol tartrate (LOPRESSOR) 25 MG tablet; Take 1 tablet by mouth 2 (Two) Times a Day.  Dispense: 180 tablet; Refill: 3      Follow Up:  Return in about 4 months (around 5/26/2021) for 30 (THIRTY) min follow up, Fasting.     An After Visit Summary and PPPS were given to the patient.

## 2021-01-26 NOTE — PATIENT INSTRUCTIONS
Medicare Wellness  Personal Prevention Plan of Service     Date of Office Visit:  2021  Encounter Provider:  Joycelyn Zapata MD  Place of Service:  [facility] INTERNAL MEDIC[facility]  Patient Name: Marilynn Gil  :  1935    As part of the Medicare Wellness portion of your visit today, we are providing you with this personalized preventive plan of services (PPPS). This plan is based upon recommendations of the [place] Preventive Services Task Force (USPSTF) and the Advisory Committee on Immunization Practices (ACIP).    This lists the preventive care services that should be considered, and provides dates of when you are due. Items listed as completed are up-to-date and do not require any further intervention.    Health Maintenance   Topic Date Due   • ZOSTER VACCINE (1 of 2) 1987   • ANNUAL WELLNESS VISIT  2020   • LIPID PANEL  2021   • TDAP/TD VACCINES (2 - Td) 2028   • INFLUENZA VACCINE  Completed   • Pneumococcal Vaccine 65+  Completed   • MENINGOCOCCAL VACCINE  Aged Out   • MAMMOGRAM  Discontinued   • DXA SCAN  Discontinued       No orders of the defined types were placed in this encounter.      Return in about 4 months (around 2021) for 30 (THIRTY) min follow up, Fasting.    Please send us a copy of your living will/advanced directive

## 2021-02-03 ENCOUNTER — TELEPHONE (OUTPATIENT)
Dept: INTERNAL MEDICINE | Facility: CLINIC | Age: 86
End: 2021-02-03

## 2021-02-03 NOTE — TELEPHONE ENCOUNTER
PATIENT CALLED AND STATED THAT SHE RECEIVED A CALL FROM THE PRACTICE AND THEY SPOKE ABOUT A KNEE BRACE. THE PATIENT IS REQUESTING THAT NO KNEE BRACE BE ORDERED, SHE STATES SHE DOES NOT NEED IT.    PATIENT CALLBACK: [phone]

## 2021-04-30 DIAGNOSIS — G47.00 INSOMNIA: ICD-10-CM

## 2021-04-30 RX ORDER — ZOLPIDEM TARTRATE 10 MG/1
TABLET ORAL
Qty: 30 TABLET | Refills: 2 | Status: SHIPPED | OUTPATIENT
Start: 2021-04-30 | End: 2021-07-26

## 2021-04-30 NOTE — TELEPHONE ENCOUNTER
Please review refill request:    Last OV: 1/26/2021    Next OV: 6/11/2021    Last Prescribed: 10/28/2020-By Dr. Nunu COTA: Provider to retrieve and review    Controlled Substance Agreement Form: Need to update cotnract    Thank you,    Robin

## 2021-06-11 ENCOUNTER — OFFICE VISIT (OUTPATIENT)
Dept: INTERNAL MEDICINE | Facility: CLINIC | Age: 86
End: 2021-06-11

## 2021-06-11 VITALS
OXYGEN SATURATION: 94 % | WEIGHT: 99 LBS | HEART RATE: 58 BPM | BODY MASS INDEX: 21.36 KG/M2 | HEIGHT: 57 IN | TEMPERATURE: 97.6 F | DIASTOLIC BLOOD PRESSURE: 60 MMHG | SYSTOLIC BLOOD PRESSURE: 118 MMHG

## 2021-06-11 DIAGNOSIS — I10 ESSENTIAL HYPERTENSION: Primary | Chronic | ICD-10-CM

## 2021-06-11 DIAGNOSIS — E78.00 HYPERCHOLESTEROLEMIA: Chronic | ICD-10-CM

## 2021-06-11 DIAGNOSIS — R73.01 ELEVATED FASTING GLUCOSE: ICD-10-CM

## 2021-06-11 PROCEDURE — 99214 OFFICE O/P EST MOD 30 MIN: CPT | Performed by: INTERNAL MEDICINE

## 2021-06-11 NOTE — PROGRESS NOTES
Chief Complaint   Patient presents with   • Hyperlipidemia   • Hypertension       Subjective   Marilynn Gil is a 86 y.o. female.     Hyperlipidemia  This is a chronic problem. The problem is controlled. Pertinent negatives include no chest pain or shortness of breath. Current antihyperlipidemic treatment includes statins.   Hypertension  This is a chronic problem. The problem is controlled. Pertinent negatives include no blurred vision, chest pain, palpitations or shortness of breath. Current antihypertension treatment includes diuretics and beta blockers. The current treatment provides significant improvement.      Elevated fasting glucose:  This is a chronic problem, currently treated with diet.    She denies polyuria, polydipsia, vision change appetite change, unexplained weight loss.   Lab Results   Component Value Date    HGBA1C 6.00 (H) 03/02/2020        The following portions of the patient's history were reviewed and updated as appropriate: allergies, current medications, past family history, past medical history, past social history, past surgical history and problem list.    Current Outpatient Medications on File Prior to Visit   Medication Sig Dispense Refill   • aspirin 325 MG tablet Take 325 mg by mouth daily.     • dicyclomine (BENTYL) 10 MG capsule Take 1 capsule by mouth 4 (Four) Times a Day Before Meals & at Bedtime As Needed (cramping). 60 capsule 2   • diphenoxylate-atropine (LOMOTIL) 2.5-0.025 MG per tablet Take 1 tablet by mouth. 1-2 tablet every six hours prn diarrhea     • furosemide (LASIX) 20 MG tablet TAKE 1 TABLET BY MOUTH DAILY 90 tablet 1   • isosorbide mononitrate (IMDUR) 60 MG 24 hr tablet 1 tablet daily.  3   • lisinopril (PRINIVIL,ZESTRIL) 5 MG tablet Take 5 mg by mouth daily.     • metoprolol tartrate (LOPRESSOR) 25 MG tablet Take 1 tablet by mouth 2 (Two) Times a Day. 180 tablet 3   • NITROSTAT 0.4 MG SL tablet 1 tab sublingual every 5 minutes x 3 as needed for chest pressure   "0   • pantoprazole (PROTONIX) 40 MG EC tablet Take 1 tablet by mouth Daily. 90 tablet 3   • pentoxifylline (TRENTal) 400 MG CR tablet 1 tablet 3 (three) times a day.  3   • potassium chloride (K-DUR,KLOR-CON) 10 MEQ CR tablet TAKE 1 TABLET BY MOUTH DAILY 90 tablet 1   • prasugrel (EFFIENT) tablet Take 5 mg by mouth daily.     • simvastatin (ZOCOR) 10 MG tablet TK 1 T PO D UTD  3   • zolpidem (AMBIEN) 10 MG tablet TAKE 1/2 TO 1 TABLET BY MOUTH AT BEDTIME AS NEEDED 30 tablet 2   • [DISCONTINUED] BREO ELLIPTA 100-25 MCG/INH inhaler INHALE 1 PUFF BY MOUTH DAILY 60 each 4   • [DISCONTINUED] meclizine 25 MG chewable tablet chewable tablet Chew 25 mg. 1 tablet every 6-8 hours PRN     • [DISCONTINUED] ondansetron (ZOFRAN) 4 MG tablet Take 1 tablet by mouth Every 8 (Eight) Hours As Needed for Nausea or Vomiting. 1 tablet every 4-6 hours as needed for nausea 30 tablet 5     No current facility-administered medications on file prior to visit.          Review of Systems   Constitutional: Negative for appetite change.   HENT: Negative for nosebleeds.    Eyes: Negative for blurred vision and double vision.   Respiratory: Negative for cough and shortness of breath.    Cardiovascular: Negative for chest pain, palpitations and leg swelling.   Neurological:        Neuropathy continues to cause burning discomfort in hands and feet           Objective     /60   Pulse 58   Temp 97.6 °F (36.4 °C)   Ht 143.5 cm (56.5\")   Wt 44.9 kg (99 lb)   SpO2 94%   BMI 21.80 kg/m²       Physical Exam  Vitals and nursing note reviewed.   Constitutional:       General: She is not in acute distress.     Appearance: She is well-developed.   Neck:      Vascular: Normal carotid pulses. No carotid bruit.   Cardiovascular:      Rate and Rhythm: Regular rhythm.      Pulses:           Carotid pulses are 2+ on the right side and 2+ on the left side.     Heart sounds: S1 normal and S2 normal. No murmur heard.   No friction rub. No gallop.  "   Pulmonary:      Effort: Pulmonary effort is normal.      Breath sounds: Normal breath sounds. No wheezing, rhonchi or rales.   Musculoskeletal:      Right lower leg: No edema.      Left lower leg: No edema.   Skin:     General: Skin is warm and dry.   Neurological:      Mental Status: She is alert and oriented to person, place, and time.       Lab Results   Component Value Date    CHOL 249 (H) 10/02/2018    CHLPL 220 (H) 03/02/2020    TRIG 198 (H) 03/02/2020    HDL 57 03/02/2020     (H) 03/02/2020           Assessment/Plan   Diagnoses and all orders for this visit:    1. Essential hypertension (Primary)  -     Comprehensive Metabolic Panel  -     CBC (No Diff)    2. Hypercholesterolemia  -     Lipid Panel With / Chol / HDL Ratio    3. Elevated fasting glucose  -     Hemoglobin A1c      She will continue same medications.  Encouraged prudent diet and regular exercise.

## 2021-06-12 LAB
ALBUMIN SERPL-MCNC: 4 G/DL (ref 3.6–4.6)
ALBUMIN/GLOB SERPL: 1.4 {RATIO} (ref 1.2–2.2)
ALP SERPL-CCNC: 149 IU/L (ref 48–121)
ALT SERPL-CCNC: 9 IU/L (ref 0–32)
AST SERPL-CCNC: 20 IU/L (ref 0–40)
BILIRUB SERPL-MCNC: 0.3 MG/DL (ref 0–1.2)
BUN SERPL-MCNC: 38 MG/DL (ref 8–27)
BUN/CREAT SERPL: 19 (ref 12–28)
CALCIUM SERPL-MCNC: 9 MG/DL (ref 8.7–10.3)
CHLORIDE SERPL-SCNC: 104 MMOL/L (ref 96–106)
CHOLEST SERPL-MCNC: 265 MG/DL (ref 100–199)
CHOLEST/HDLC SERPL: 4.6 RATIO (ref 0–4.4)
CO2 SERPL-SCNC: 24 MMOL/L (ref 20–29)
CREAT SERPL-MCNC: 2 MG/DL (ref 0.57–1)
ERYTHROCYTE [DISTWIDTH] IN BLOOD BY AUTOMATED COUNT: 14.3 % (ref 11.7–15.4)
GLOBULIN SER CALC-MCNC: 2.8 G/DL (ref 1.5–4.5)
GLUCOSE SERPL-MCNC: 102 MG/DL (ref 65–99)
HBA1C MFR BLD: 6.1 % (ref 4.8–5.6)
HCT VFR BLD AUTO: 42.5 % (ref 34–46.6)
HDLC SERPL-MCNC: 57 MG/DL
HGB BLD-MCNC: 13.9 G/DL (ref 11.1–15.9)
LDLC SERPL CALC-MCNC: 173 MG/DL (ref 0–99)
MCH RBC QN AUTO: 28.9 PG (ref 26.6–33)
MCHC RBC AUTO-ENTMCNC: 32.7 G/DL (ref 31.5–35.7)
MCV RBC AUTO: 88 FL (ref 79–97)
PLATELET # BLD AUTO: 200 X10E3/UL (ref 150–450)
POTASSIUM SERPL-SCNC: 5.2 MMOL/L (ref 3.5–5.2)
PROT SERPL-MCNC: 6.8 G/DL (ref 6–8.5)
RBC # BLD AUTO: 4.81 X10E6/UL (ref 3.77–5.28)
SODIUM SERPL-SCNC: 141 MMOL/L (ref 134–144)
TRIGL SERPL-MCNC: 190 MG/DL (ref 0–149)
VLDLC SERPL CALC-MCNC: 35 MG/DL (ref 5–40)
WBC # BLD AUTO: 5.8 X10E3/UL (ref 3.4–10.8)

## 2021-07-09 RX ORDER — FUROSEMIDE 20 MG/1
TABLET ORAL
Qty: 90 TABLET | Refills: 1 | Status: SHIPPED | OUTPATIENT
Start: 2021-07-09 | End: 2022-01-26 | Stop reason: SDUPTHER

## 2021-07-09 RX ORDER — POTASSIUM CHLORIDE 750 MG/1
TABLET, EXTENDED RELEASE ORAL
Qty: 90 TABLET | Refills: 1 | Status: SHIPPED | OUTPATIENT
Start: 2021-07-09 | End: 2022-01-27 | Stop reason: SDUPTHER

## 2021-07-26 DIAGNOSIS — G47.00 INSOMNIA: ICD-10-CM

## 2021-07-26 RX ORDER — ZOLPIDEM TARTRATE 10 MG/1
TABLET ORAL
Qty: 30 TABLET | Refills: 1 | Status: SHIPPED | OUTPATIENT
Start: 2021-07-26 | End: 2021-09-20

## 2021-09-09 ENCOUNTER — TELEPHONE (OUTPATIENT)
Dept: INTERNAL MEDICINE | Facility: CLINIC | Age: 86
End: 2021-09-09

## 2021-09-09 NOTE — TELEPHONE ENCOUNTER
Caller: DOMI WITH [employer]    Relationship:     Best call back number: [phone]    What is the best time to reach you: ANYTIME    Who are you requesting to speak with (clinical staff, provider,  specific staff member): CLINICAL STAFF/ DR MCNULTY    Do you know the name of the person who called: DOMI WITH [employer]    What was the call regarding: WANTED TO KNOW IF FAX WAS RECEIVED THAT WAS SENT ON 09/08/2021 WAS A REQUEST FOR ORTHOPEDIC SUPPORT FOR BACK FOR THE PATIENT THAT THE PATIENT REQUESTED THROUGH THIS COMPANY, PLEASE ADVISE IF THIS WAS RECEIVED, DR MCNULTY WILL NEED TO SIGN AND INCLUDE MEDICAL CHART NOTES FOR THE PATIENT AND RETURN TO FAX# [phone] ASAP    Do you require a callback: YES OR RETURN FAX

## 2021-09-13 NOTE — TELEPHONE ENCOUNTER
CK WITH [employer] IS CALLING IN TO SEE IF THE OFFICE RECEIVED A FAX ABOUT PATIENTS BACK SUPPORT.  I ATTEMPTED TO CONTACT CK WITH THE OFFICE AFTER I READ THE LAST ENCOUNTER RESPONSE BUT NO ONE ANSWERED THE PHONE. CK CAN BE REACHED  [phone]

## 2021-09-15 NOTE — TELEPHONE ENCOUNTER
Caller: DOMI     Relationship to patient:     Best call back number: [phone]    Patient is needing: DOMI WITH [employer] IS CALLING ONCE MORE IN REGARDS TO THE FAX THAT WAS SENT OVER FOR PATIENTS BACK SUPPORT.

## 2021-09-15 NOTE — TELEPHONE ENCOUNTER
Spoke w/ pt to sherri- SHE DID NOT REQUEST NOR DOES SHE WANT OR NEED A BACK BRACE.  THIS IS A SCAM.    DO NOT take any further messages from this company

## 2021-09-19 DIAGNOSIS — G47.00 INSOMNIA: ICD-10-CM

## 2021-09-20 RX ORDER — ZOLPIDEM TARTRATE 10 MG/1
TABLET ORAL
Qty: 30 TABLET | Refills: 3 | Status: SHIPPED | OUTPATIENT
Start: 2021-09-20 | End: 2022-01-17

## 2021-11-23 ENCOUNTER — OFFICE VISIT (OUTPATIENT)
Dept: INTERNAL MEDICINE | Facility: CLINIC | Age: 86
End: 2021-11-23

## 2021-11-23 VITALS
WEIGHT: 100 LBS | TEMPERATURE: 98.2 F | BODY MASS INDEX: 22.5 KG/M2 | HEART RATE: 71 BPM | OXYGEN SATURATION: 93 % | HEIGHT: 56 IN | SYSTOLIC BLOOD PRESSURE: 110 MMHG | DIASTOLIC BLOOD PRESSURE: 74 MMHG

## 2021-11-23 DIAGNOSIS — L02.91 CUTANEOUS ABSCESS, UNSPECIFIED SITE: Primary | ICD-10-CM

## 2021-11-23 PROCEDURE — 99213 OFFICE O/P EST LOW 20 MIN: CPT | Performed by: INTERNAL MEDICINE

## 2021-11-23 RX ORDER — DOXYCYCLINE HYCLATE 100 MG/1
100 CAPSULE ORAL 2 TIMES DAILY
Qty: 14 CAPSULE | Refills: 0 | Status: SHIPPED | OUTPATIENT
Start: 2021-11-23 | End: 2021-11-30

## 2021-11-23 NOTE — PROGRESS NOTES
Chief Complaint   Patient presents with   • Cyst     on jaw getting bigger       Subjective   Marilynn Gil is a 86 y.o. female.     History of Present Illness     She has a swelling in right face for 3 weeks, it is painful to touch.  No fever. She has been using warm compresses.  No problems with chewing or swallowing.    The following portions of the patient's history were reviewed and updated as appropriate: allergies, current medications, past family history, past medical history, past social history, past surgical history and problem list.      Current Outpatient Medications:   •  aspirin 325 MG tablet, Take 325 mg by mouth daily., Disp: , Rfl:   •  furosemide (LASIX) 20 MG tablet, TAKE 1 TABLET BY MOUTH DAILY, Disp: 90 tablet, Rfl: 1  •  isosorbide mononitrate (IMDUR) 60 MG 24 hr tablet, 1 tablet daily., Disp: , Rfl: 3  •  lisinopril (PRINIVIL,ZESTRIL) 5 MG tablet, Take 5 mg by mouth daily., Disp: , Rfl:   •  metoprolol tartrate (LOPRESSOR) 25 MG tablet, Take 1 tablet by mouth 2 (Two) Times a Day., Disp: 180 tablet, Rfl: 3  •  NITROSTAT 0.4 MG SL tablet, 1 tab sublingual every 5 minutes x 3 as needed for chest pressure, Disp: , Rfl: 0  •  pentoxifylline (TRENTal) 400 MG CR tablet, 1 tablet 3 (three) times a day., Disp: , Rfl: 3  •  potassium chloride (K-DUR,KLOR-CON) 10 MEQ CR tablet, TAKE 1 TABLET BY MOUTH DAILY, Disp: 90 tablet, Rfl: 1  •  prasugrel (EFFIENT) tablet, Take 5 mg by mouth daily., Disp: , Rfl:   •  simvastatin (ZOCOR) 10 MG tablet, TK 1 T PO D UTD, Disp: , Rfl: 3  •  zolpidem (AMBIEN) 10 MG tablet, TAKE 1/2 TO 1 TABLET BY MOUTH AT BEDTIME AS NEEDED, Disp: 30 tablet, Rfl: 3  •  doxycycline (VIBRAMYCIN) 100 MG capsule, Take 1 capsule by mouth 2 (Two) Times a Day for 7 days., Disp: 14 capsule, Rfl: 0           Review of Systems   Constitutional: Negative for appetite change and fever.   HENT: Negative for trouble swallowing.            Objective     /74   Pulse 71   Temp 98.2 °F (36.8  "°C)   Ht 143.5 cm (56.5\")   Wt 45.4 kg (100 lb)   SpO2 93%   BMI 22.03 kg/m²       Physical Exam  Skin:     Comments: Right facial lesion: Located over jaw area, firm swelling, mildly erythematous, about 1/2 inch in diameter, moveable.           Assessment/Plan   Diagnoses and all orders for this visit:    1. Cutaneous abscess, unspecified site (Primary)  Comments:  Right face   Orders:  -     Ambulatory Referral to General Surgery  -     doxycycline (VIBRAMYCIN) 100 MG capsule; Take 1 capsule by mouth 2 (Two) Times a Day for 7 days.  Dispense: 14 capsule; Refill: 0    discussed. It appears to be a skin abscess. Doxycycline started today, she will continue warm compresses.  It may or may not require drainage. Referred to general surgery.                No follow-ups on file.  "

## 2021-12-14 ENCOUNTER — OFFICE VISIT (OUTPATIENT)
Dept: INTERNAL MEDICINE | Facility: CLINIC | Age: 86
End: 2021-12-14

## 2021-12-14 VITALS
TEMPERATURE: 98.4 F | HEART RATE: 91 BPM | SYSTOLIC BLOOD PRESSURE: 112 MMHG | DIASTOLIC BLOOD PRESSURE: 68 MMHG | BODY MASS INDEX: 22.95 KG/M2 | OXYGEN SATURATION: 98 % | WEIGHT: 102 LBS | HEIGHT: 56 IN

## 2021-12-14 DIAGNOSIS — E78.00 HYPERCHOLESTEROLEMIA: Chronic | ICD-10-CM

## 2021-12-14 DIAGNOSIS — R73.01 ELEVATED FASTING GLUCOSE: ICD-10-CM

## 2021-12-14 DIAGNOSIS — I10 PRIMARY HYPERTENSION: Primary | Chronic | ICD-10-CM

## 2021-12-14 DIAGNOSIS — N18.30 STAGE 3 CHRONIC KIDNEY DISEASE, UNSPECIFIED WHETHER STAGE 3A OR 3B CKD (HCC): ICD-10-CM

## 2021-12-14 PROCEDURE — 99214 OFFICE O/P EST MOD 30 MIN: CPT | Performed by: INTERNAL MEDICINE

## 2021-12-14 NOTE — PROGRESS NOTES
Chief Complaint   Patient presents with   • Follow-up     Pt wants the right side of her face looked out.   • Wheezing     Pt wants  to take her bp.   • Hypertension   • Hyperlipidemia       Subjective   Marilynn Gil is a 86 y.o. female.     History of Present Illness     Hyperlipidemia:    This is a chronic problem currently treated with simvastatin.  Her most recent lipid panel showed:  Lab Results   Component Value Date    CHOL 249 (H) 10/02/2018    CHLPL 265 (H) 06/11/2021    TRIG 190 (H) 06/11/2021    HDL 57 06/11/2021     (H) 06/11/2021      Hypertension: This is a chronic problem currently treated with furosemide, lisinopril, meotprolol.  Prudent diet and regular exercise have also been recommended.  By report, there is good compliance with treatment and good tolerance of treatment.    She has had plastic surgery consult in regard to right facial abscess.  She is taking cephalexin. There is less erythema.     She has intermittent wheezing. She has some today. No shortness of breath.      Elevated fasting glucose:  This is a chronic problem, currently treated with diet.    She denies polyuria, polydipsia, vision change appetite change, unexplained weight loss.   Lab Results   Component Value Date    HGBA1C 6.1 (H) 06/11/2021          The following portions of the patient's history were reviewed and updated as appropriate: allergies, current medications, past family history, past medical history, past social history, past surgical history and problem list.      Current Outpatient Medications:   •  aspirin 325 MG tablet, Take 325 mg by mouth daily., Disp: , Rfl:   •  furosemide (LASIX) 20 MG tablet, TAKE 1 TABLET BY MOUTH DAILY, Disp: 90 tablet, Rfl: 1  •  isosorbide mononitrate (IMDUR) 60 MG 24 hr tablet, 1 tablet daily., Disp: , Rfl: 3  •  lisinopril (PRINIVIL,ZESTRIL) 5 MG tablet, Take 5 mg by mouth daily., Disp: , Rfl:   •  metoprolol tartrate (LOPRESSOR) 25 MG tablet, Take 1 tablet by  "mouth 2 (Two) Times a Day., Disp: 180 tablet, Rfl: 3  •  NITROSTAT 0.4 MG SL tablet, 1 tab sublingual every 5 minutes x 3 as needed for chest pressure, Disp: , Rfl: 0  •  pentoxifylline (TRENTal) 400 MG CR tablet, 1 tablet 3 (three) times a day., Disp: , Rfl: 3  •  potassium chloride (K-DUR,KLOR-CON) 10 MEQ CR tablet, TAKE 1 TABLET BY MOUTH DAILY, Disp: 90 tablet, Rfl: 1  •  prasugrel (EFFIENT) tablet, Take 5 mg by mouth daily., Disp: , Rfl:   •  simvastatin (ZOCOR) 10 MG tablet, TK 1 T PO D UTD, Disp: , Rfl: 3  •  zolpidem (AMBIEN) 10 MG tablet, TAKE 1/2 TO 1 TABLET BY MOUTH AT BEDTIME AS NEEDED, Disp: 30 tablet, Rfl: 3           Review of Systems   Constitutional: Negative for appetite change.   HENT: Negative for nosebleeds.    Eyes: Negative for blurred vision and double vision.   Respiratory: Positive for wheezing (sometimes). Negative for cough and shortness of breath.    Cardiovascular: Negative for chest pain, palpitations and leg swelling.           Objective     /68   Pulse 91   Temp 98.4 °F (36.9 °C) (Temporal)   Ht 143.5 cm (56.5\")   Wt 46.3 kg (102 lb)   SpO2 98%   BMI 22.47 kg/m²       Physical Exam  Vitals and nursing note reviewed.   Constitutional:       General: She is not in acute distress.     Appearance: She is well-developed.   Neck:      Vascular: Normal carotid pulses. No carotid bruit.   Cardiovascular:      Rate and Rhythm: Regular rhythm.      Pulses:           Carotid pulses are 2+ on the right side and 2+ on the left side.     Heart sounds: S1 normal and S2 normal. No murmur heard.  No friction rub. No gallop.    Pulmonary:      Effort: Pulmonary effort is normal.      Breath sounds: Normal breath sounds. No wheezing, rhonchi or rales.   Skin:     General: Skin is warm and dry.      Comments: Localized swelling right face   Neurological:      Mental Status: She is alert and oriented to person, place, and time.           Assessment/Plan   Diagnoses and all orders for this " visit:    1. Primary hypertension (Primary)  -     Comprehensive Metabolic Panel    2. Hypercholesterolemia  -     Lipid Panel With / Chol / HDL Ratio    3. Stage 3 chronic kidney disease, unspecified whether stage 3a or 3b CKD (HCC)  -     Comprehensive Metabolic Panel    4. Elevated fasting glucose  -     Hemoglobin A1c      She will continue antibiotic for facial abscess.It appears improved.  She has follow up scheduled with plastic surgery.  Discussed wheezing. She has used an inhaler in the past. Does not feel the need to use one at this time. She is not short of breath and O2 level is good. She will continue same medications.  Encouraged prudent diet.             Return in about 6 months (around 6/14/2022) for 30 (THIRTY) min follow up.

## 2021-12-15 LAB
ALBUMIN SERPL-MCNC: 3.6 G/DL (ref 3.6–4.6)
ALBUMIN/GLOB SERPL: 1.3 {RATIO} (ref 1.2–2.2)
ALP SERPL-CCNC: 140 IU/L (ref 44–121)
ALT SERPL-CCNC: 14 IU/L (ref 0–32)
AST SERPL-CCNC: 21 IU/L (ref 0–40)
BILIRUB SERPL-MCNC: 0.3 MG/DL (ref 0–1.2)
BUN SERPL-MCNC: 27 MG/DL (ref 8–27)
BUN/CREAT SERPL: 14 (ref 12–28)
CALCIUM SERPL-MCNC: 8.7 MG/DL (ref 8.7–10.3)
CHLORIDE SERPL-SCNC: 101 MMOL/L (ref 96–106)
CHOLEST SERPL-MCNC: 173 MG/DL (ref 100–199)
CHOLEST/HDLC SERPL: 2.7 RATIO (ref 0–4.4)
CO2 SERPL-SCNC: 25 MMOL/L (ref 20–29)
CREAT SERPL-MCNC: 1.99 MG/DL (ref 0.57–1)
GLOBULIN SER CALC-MCNC: 2.8 G/DL (ref 1.5–4.5)
GLUCOSE SERPL-MCNC: 99 MG/DL (ref 65–99)
HBA1C MFR BLD: 6.2 % (ref 4.8–5.6)
HDLC SERPL-MCNC: 65 MG/DL
LDLC SERPL CALC-MCNC: 86 MG/DL (ref 0–99)
POTASSIUM SERPL-SCNC: 5.3 MMOL/L (ref 3.5–5.2)
PROT SERPL-MCNC: 6.4 G/DL (ref 6–8.5)
SODIUM SERPL-SCNC: 139 MMOL/L (ref 134–144)
TRIGL SERPL-MCNC: 125 MG/DL (ref 0–149)
VLDLC SERPL CALC-MCNC: 22 MG/DL (ref 5–40)

## 2022-01-01 ENCOUNTER — TELEPHONE (OUTPATIENT)
Dept: INTERNAL MEDICINE | Facility: CLINIC | Age: 87
End: 2022-01-01

## 2022-01-01 DIAGNOSIS — E11.29 TYPE 2 DIABETES MELLITUS WITH OTHER DIABETIC KIDNEY COMPLICATION, WITHOUT LONG-TERM CURRENT USE OF INSULIN: Primary | ICD-10-CM

## 2022-01-15 DIAGNOSIS — G47.00 INSOMNIA: ICD-10-CM

## 2022-01-15 RX ORDER — ZOLPIDEM TARTRATE 10 MG/1
TABLET ORAL
Qty: 30 TABLET | Status: CANCELLED | OUTPATIENT
Start: 2022-01-15

## 2022-01-17 DIAGNOSIS — G47.00 INSOMNIA: ICD-10-CM

## 2022-01-17 RX ORDER — ZOLPIDEM TARTRATE 10 MG/1
TABLET ORAL
Qty: 30 TABLET | Refills: 2 | Status: SHIPPED | OUTPATIENT
Start: 2022-01-17 | End: 2022-01-19

## 2022-01-17 RX ORDER — ZOLPIDEM TARTRATE 10 MG/1
TABLET ORAL
Qty: 30 TABLET | Status: CANCELLED | OUTPATIENT
Start: 2022-01-17

## 2022-01-19 RX ORDER — ZOLPIDEM TARTRATE 10 MG/1
TABLET ORAL
Qty: 30 TABLET | Refills: 0 | Status: SHIPPED | OUTPATIENT
Start: 2022-01-19 | End: 2022-05-09

## 2022-01-20 NOTE — TELEPHONE ENCOUNTER
Please call and move patient appointment up in the next month. Advise would like to meet her to continue refilling medication. One month given.

## 2022-01-27 RX ORDER — POTASSIUM CHLORIDE 750 MG/1
10 TABLET, EXTENDED RELEASE ORAL DAILY
Qty: 90 TABLET | Refills: 1 | Status: SHIPPED | OUTPATIENT
Start: 2022-01-27 | End: 2022-01-28

## 2022-01-28 RX ORDER — POTASSIUM CHLORIDE 750 MG/1
10 TABLET, EXTENDED RELEASE ORAL DAILY
Qty: 90 TABLET | Refills: 1 | Status: SHIPPED | OUTPATIENT
Start: 2022-01-28 | End: 2022-08-01

## 2022-01-28 RX ORDER — FUROSEMIDE 20 MG/1
20 TABLET ORAL DAILY
Qty: 90 TABLET | Refills: 1 | Status: SHIPPED | OUTPATIENT
Start: 2022-01-28 | End: 2022-01-28 | Stop reason: SDUPTHER

## 2022-01-28 RX ORDER — FUROSEMIDE 20 MG/1
20 TABLET ORAL DAILY
Qty: 90 TABLET | Refills: 1 | Status: SHIPPED | OUTPATIENT
Start: 2022-01-28 | End: 2022-07-25

## 2022-03-16 RX ORDER — PANTOPRAZOLE SODIUM 40 MG/1
40 TABLET, DELAYED RELEASE ORAL DAILY
Qty: 90 TABLET | Refills: 3 | OUTPATIENT
Start: 2022-03-16

## 2022-03-21 ENCOUNTER — TELEPHONE (OUTPATIENT)
Dept: INTERNAL MEDICINE | Facility: CLINIC | Age: 87
End: 2022-03-21

## 2022-03-21 ENCOUNTER — OFFICE VISIT (OUTPATIENT)
Dept: INTERNAL MEDICINE | Facility: CLINIC | Age: 87
End: 2022-03-21

## 2022-03-21 VITALS
HEIGHT: 57 IN | BODY MASS INDEX: 21.79 KG/M2 | DIASTOLIC BLOOD PRESSURE: 62 MMHG | OXYGEN SATURATION: 99 % | SYSTOLIC BLOOD PRESSURE: 114 MMHG | HEART RATE: 74 BPM | WEIGHT: 101 LBS | TEMPERATURE: 97.4 F

## 2022-03-21 DIAGNOSIS — M25.562 ACUTE PAIN OF LEFT KNEE: Primary | ICD-10-CM

## 2022-03-21 PROCEDURE — 99214 OFFICE O/P EST MOD 30 MIN: CPT | Performed by: NURSE PRACTITIONER

## 2022-03-21 NOTE — TELEPHONE ENCOUNTER
I called and got the pt scheduled for an ER f/u and also called [facility] and requested records to be faxed=spoke with Yana

## 2022-03-21 NOTE — ASSESSMENT & PLAN NOTE
Continue with tylenol 650mg every six hours as needed for pain.   Ice to area-- on 20 minutes, off 40 minutes.   Encouraged rest to the extremity.   Urgent referral to orthopedics.

## 2022-03-21 NOTE — PROGRESS NOTES
"Chief Complaint  Hospital Follow Up Visit and Knee Pain (Left knee pain)    Subjective          Marilynn Gil presents to [facility] PRIMARY [facility]ARE  History of Present Illness  This is an 85 y/o female presenting to office for f/u hospital visit to [facility] on 3/18/22. Patient reported left knee pain after falling 2 days prior. Patient had XR imaging which did not show any acute fracture. Patient was recommended to f/u with [facility] orthopedics at [address] [place] [address] on the 1st floor.    Patient reports she has been using ice for treatment of pain and tylenol as needed. Patient reports continued swelling and tenderness to left knee cap region. Specifically, when bearing weight on this.       Objective   Vital Signs:   /62   Pulse 74   Temp 97.4 °F (36.3 °C)   Ht 143.5 cm (56.5\")   Wt 45.8 kg (101 lb)   SpO2 99%   BMI 22.24 kg/m²     Physical Exam  Vitals and nursing note reviewed.   Constitutional:       Appearance: Normal appearance. She is normal weight.   HENT:      Head: Normocephalic.   Eyes:      Conjunctiva/sclera: Conjunctivae normal.      Pupils: Pupils are equal, round, and reactive to light.      Comments: +glasses   Cardiovascular:      Rate and Rhythm: Normal rate and regular rhythm.      Pulses: Normal pulses.      Heart sounds: Normal heart sounds. No murmur heard.    No friction rub. No gallop.   Pulmonary:      Effort: Pulmonary effort is normal. No respiratory distress.      Breath sounds: Normal breath sounds. No stridor. No wheezing, rhonchi or rales.   Abdominal:      General: There is no distension.      Palpations: Abdomen is soft.      Tenderness: There is no abdominal tenderness.   Musculoskeletal:         General: Swelling, tenderness and signs of injury present.      Cervical back: Normal range of motion and neck supple.      Left knee: Swelling and effusion present. Decreased range of motion. Tenderness present over the PCL.   Skin:     General: Skin is warm and " dry.   Neurological:      General: No focal deficit present.      Mental Status: She is alert and oriented to person, place, and time. Mental status is at baseline.      Motor: Weakness present.   Psychiatric:         Mood and Affect: Mood normal.         Behavior: Behavior normal.         Thought Content: Thought content normal.         Judgment: Judgment normal.        Result Review :   The following data was reviewed by: NATIVIDAD Hernandez on 03/21/2022:  Common labs    Common Labsle 6/11/21 6/11/21 6/11/21 6/11/21 12/14/21 12/14/21 12/14/21    1125 1125 1125 1125 0000 0000 0000   Glucose 102 (A)    99     BUN 38 (A)    27     Creatinine 2.00 (A)    1.99 (A)     eGFR Non African Am 22 (A)    23 (A)     eGFR  Am 26 (A)    26 (A)     Sodium 141    139     Potassium 5.2    5.3 (A)     Chloride 104    101     Calcium 9.0    8.7     Total Protein 6.8    6.4     Albumin 4.0    3.6     Total Bilirubin 0.3    0.3     Alkaline Phosphatase 149 (A)    140 (A)     AST (SGOT) 20    21     ALT (SGPT) 9    14     WBC   5.8       Hemoglobin   13.9       Hematocrit   42.5       Platelets   200       Total Cholesterol  265 (A)    173    Triglycerides  190 (A)    125    HDL Cholesterol  57    65    LDL Cholesterol   173 (A)    86    Hemoglobin A1C    6.1 (A)   6.2 (A)   (A) Abnormal value       Comments are available for some flowsheets but are not being displayed.           Reviewed:  XR reviewed from [facility] hospital.          Assessment and Plan    Diagnoses and all orders for this visit:    1. Acute pain of left knee (Primary)  Assessment & Plan:  Continue with tylenol 650mg every six hours as needed for pain.   Ice to area-- on 20 minutes, off 40 minutes.   Encouraged rest to the extremity.   Urgent referral to orthopedics.       Orders:  -     Ambulatory Referral to Orthopedic Surgery  -     Diclofenac Sodium (VOLTAREN) 1 % gel gel; Apply 4 g topically to the appropriate area as directed 4 (Four) Times a Day As Needed  (pain).  Dispense: 150 g; Refill: 1    I spent 30 minutes caring for Marilynn on this date of service. This time includes time spent by me in the following activities:preparing for the visit, reviewing tests, obtaining and/or reviewing a separately obtained history, performing a medically appropriate examination and/or evaluation , counseling and educating the patient/family/caregiver, ordering medications, tests, or procedures, documenting information in the medical record and care coordination  Follow Up   Return if symptoms worsen or fail to improve.  Patient was given instructions and counseling regarding her condition or for health maintenance advice. Please see specific information pulled into the AVS if appropriate.

## 2022-03-21 NOTE — TELEPHONE ENCOUNTER
Caller: TAMMI     Relationship: GRANDDAUGHTER     Best call back number: [phone]     What is the best time to reach you: ANY     Who are you requesting to speak with (clinical staff, provider,  specific staff member): DR. ZIMMER     Do you know the name of the person who called: TAMMI     What was the call regarding: PATIENT HAS AN APPOINTMENT ON 8/2/2022 PATIENT WAS SEEN IN THE ER ON 3/18/2022  AT [facility] FOR KNEE PAIN AND CAN BARELY WALK ON IT . PATIENT WOULD LIKE TO GET IN SOONER WITH DR. ZIMMER .  HUB OFFERED EARLIER WITH APRN PATIENT DECLINED .     Do you require a callback: YES

## 2022-03-28 ENCOUNTER — OFFICE VISIT (OUTPATIENT)
Dept: ORTHOPEDIC SURGERY | Facility: CLINIC | Age: 87
End: 2022-03-28

## 2022-03-28 VITALS — TEMPERATURE: 96.2 F | WEIGHT: 97 LBS | HEIGHT: 59 IN | BODY MASS INDEX: 19.56 KG/M2

## 2022-03-28 DIAGNOSIS — M17.12 PRIMARY OSTEOARTHRITIS OF LEFT KNEE: ICD-10-CM

## 2022-03-28 DIAGNOSIS — S83.412A SPRAIN OF MEDIAL COLLATERAL LIGAMENT OF LEFT KNEE, INITIAL ENCOUNTER: ICD-10-CM

## 2022-03-28 DIAGNOSIS — M25.562 LEFT KNEE PAIN, UNSPECIFIED CHRONICITY: Primary | ICD-10-CM

## 2022-03-28 PROCEDURE — 99213 OFFICE O/P EST LOW 20 MIN: CPT | Performed by: NURSE PRACTITIONER

## 2022-03-28 PROCEDURE — 73562 X-RAY EXAM OF KNEE 3: CPT | Performed by: NURSE PRACTITIONER

## 2022-03-28 RX ORDER — ROSUVASTATIN CALCIUM 10 MG/1
10 TABLET, COATED ORAL DAILY
COMMUNITY
Start: 2022-01-22 | End: 2022-10-11 | Stop reason: SDUPTHER

## 2022-03-28 NOTE — PROGRESS NOTES
Patient Name: Marilynn Gil   YOB: 1935  Referring Primary Care Physician: Pam Charles APRN  BMI: Body mass index is 19.59 kg/m².    Chief Complaint:    Chief Complaint   Patient presents with   • Left Knee - Pain        HPI: Pt fell on 3-18-22 at home on her carpet and hit her knee. She did not hit her head. Granddaughter at bedside. Pt is walking with a walker. Pt lives at home alone. Pt went to the ER [facility] and sent here for follow up. Pt is active and lives independently    Marilynn Gil is a 87 y.o. female who presents today for evaluation of   Chief Complaint   Patient presents with   • Left Knee - Pain         Subjective   Medications:   Home Medications:  Current Outpatient Medications on File Prior to Visit   Medication Sig   • Acetaminophen (TYLENOL PO) Take  by mouth As Needed.   • aspirin 325 MG tablet Take 325 mg by mouth daily.   • Diclofenac Sodium (VOLTAREN) 1 % gel gel Apply 4 g topically to the appropriate area as directed 4 (Four) Times a Day As Needed (pain).   • furosemide (LASIX) 20 MG tablet Take 1 tablet by mouth Daily.   • isosorbide mononitrate (IMDUR) 60 MG 24 hr tablet 1 tablet daily.   • lisinopril (PRINIVIL,ZESTRIL) 5 MG tablet Take 5 mg by mouth daily.   • metoprolol tartrate (LOPRESSOR) 25 MG tablet Take 1 tablet by mouth 2 (Two) Times a Day.   • NITROSTAT 0.4 MG SL tablet 1 tab sublingual every 5 minutes x 3 as needed for chest pressure   • pentoxifylline (TRENTal) 400 MG CR tablet 1 tablet 3 (three) times a day.   • potassium chloride (K-DUR,KLOR-CON) 10 MEQ CR tablet TAKE 1 TABLET BY MOUTH DAILY   • prasugrel (EFFIENT) tablet Take 5 mg by mouth daily.   • rosuvastatin (CRESTOR) 10 MG tablet    • zolpidem (AMBIEN) 10 MG tablet TAKE 1/2 TO 1 TABLET BY MOUTH AT BEDTIME AS NEEDED   • simvastatin (ZOCOR) 10 MG tablet TK 1 T PO D UTD     No current facility-administered medications on file prior to visit.     Current Medications:  Scheduled Meds:  Continuous  Infusions:No current facility-administered medications for this visit.    PRN Meds:.    I have reviewed the patient's medical history in detail and updated the computerized patient record.  Review and summarization of old records includes:    Past Medical History:   Diagnosis Date   • Anxiety    • CAD in native artery    • Chronic kidney disease, stage 3 (HCC)    • GERD (gastroesophageal reflux disease)    • Hypercholesterolemia    • Hypertension    • Insomnia    • Mesenteric ischemia, chronic (HCC)    • Osteoporosis    • Pancreatitis    • Stroke (HCC)    • Venous thrombosis and embolism         Past Surgical History:   Procedure Laterality Date   • BACK SURGERY      x 4   • BREAST MASS EXCISION      Benign   • CATARACT EXTRACTION Bilateral    • CHOLECYSTECTOMY  2013   • COLONOSCOPY  2012    scarred mucosa,internal hemorrhoids   • COLONOSCOPY  2012    Patent side to side ileo colonic anastomosis, one 7mm polyp in the ascending colon, erythematous and vascular pattern decreased mucosa in the transverse colon, scarred mucosa in the rectum, NBIH.  PATH: Tubular adenoma, Patchy mild chronic inflammation    • CORONARY ANGIOPLASTY      MI , s/p angioplasty    • ENDOSCOPY  2013    z line irreg 38cm from the incisors, tortuous esophagus, HH, gastritis, bilious gastric fluid, duodenitis.  PATH: Gastric mucosa with minimal chornic infalmmation and with histologic features suggestive of reactive gastropathy.    • PANCREAS SURGERY  2013    Pancreatic duct stricture, stent placed   • RECTAL PROLAPSE REPAIR      Rectopexy and sigmoidectomy, s/p rectal prolapse repair x 2 prior to    • SPHINCTEROTOMY  2013        Social History     Occupational History   • Occupation: NOT EMPLOYED   Tobacco Use   • Smoking status: Former Smoker     Packs/day: 2.00     Types: Cigarettes     Quit date:      Years since quittin.2   • Smokeless tobacco: Never Used   Substance and Sexual Activity   •  "Alcohol use: No   • Drug use: No   • Sexual activity: Defer      Social History     Social History Narrative    LIVES ALONE        Family History   Problem Relation Age of Onset   • Cancer Brother    • Pancreatitis Paternal Aunt    • Diabetes Father        ROS: 14 point review of systems was performed and all other systems were reviewed and are negative except for documented findings in HPI and today's encounter.     Allergies:   Allergies   Allergen Reactions   • Codeine Nausea And Vomiting   • Gabapentin Other (See Comments)     Did not tolerate   • Lyrica [Pregabalin] Other (See Comments)     Did not tolerate   • Other Nausea And Vomiting     Darvocet   • Shellfish-Derived Products Other (See Comments)     Cant remember   • Sulfa Antibiotics Other (See Comments)     Can't remember     Constitutional:  Denies fever, shaking or chills   Eyes:  Denies change in visual acuity   HENT:  Denies nasal congestion or sore throat   Respiratory:  Denies cough or shortness of breath   Cardiovascular:  Denies chest pain or severe LE edema   GI:  Denies abdominal pain, nausea, vomiting, bloody stools or diarrhea   Musculoskeletal:  Numbness, tingling, pain, or loss of motor function only as noted above in history of present illness.  : Denies painful urination or hematuria  Integument:  Denies rash, lesion or ulceration   Neurologic:  Denies headache or focal weakness  Endocrine:  Denies lymphadenopathy  Psych:  Denies confusion or change in mental status   Hem:  Denies active bleeding    OBJECTIVE:  Physical Exam: 87 y.o. female  Wt Readings from Last 3 Encounters:   03/28/22 44 kg (97 lb)   03/21/22 45.8 kg (101 lb)   12/14/21 46.3 kg (102 lb)     Ht Readings from Last 1 Encounters:   03/28/22 149.9 cm (59\")     Body mass index is 19.59 kg/m².  Vitals:    03/28/22 1308   Temp: 96.2 °F (35.7 °C)     Vital signs reviewed.     General Appearance:    Alert, cooperative, in no acute distress                  Eyes: conjunctiva " clear  ENT: external ears and nose atraumatic  CV: no peripheral edema  Resp: normal respiratory effort  Skin: no rashes or wounds; normal turgor  Psych: mood and affect appropriate  Lymph: no nodes appreciated  Neuro: gross sensation intact  Vascular:  Palpable peripheral pulse in noted extremity  Musculoskeletal Extremities: Skin is warm dry intact with good pulses movement and sensation she is tender over the medial collateral ligament tibial plateau is nontender extensor tendons and mechanism is intact patellar tendons intact medial joint line is tender    Radiology:   Left knee 3 views done for pain with no comparison films show tricompartmental osteoarthritis        Assessment:     ICD-10-CM ICD-9-CM   1. Left knee pain, unspecified chronicity  M25.562 719.46   2. Primary osteoarthritis of left knee  M17.12 715.16   3. Sprain of medial collateral ligament of left knee, initial encounter  S83.412A 844.1        MDM/Plan:   The diagnosis(es), natural history, pathophysiology and treatment for diagnosis(es) were discussed. Opportunity given and questions answered.  Biomechanics of pertinent body areas discussed.  When appropriate, the use of ambulatory aids discussed.  Large Joint Arthrocentesis: L knee  Date/Time: 3/31/2022 12:43 PM  Consent given by: patient  Site marked: site marked  Timeout: Immediately prior to procedure a time out was called to verify the correct patient, procedure, equipment, support staff and site/side marked as required   Supporting Documentation  Indications: pain and joint swelling   Procedure Details  Location: knee - L knee  Preparation: Patient was prepped and draped in the usual sterile fashion  Needle gauge: 21 G.  Approach: anterolateral  Medications administered: 2 mL lidocaine (cardiac); 80 mg methylPREDNISolone acetate 80 MG/ML  Patient tolerance: patient tolerated the procedure well with no immediate complications        The diagnosis(es), natural history, pathophysiology and  treatment for diagnosis(es) were discussed. Opportunity given and questions answered.  Biomechanics of pertinent body areas discussed.  When appropriate, the use of ambulatory aids discussed.  MEDICATIONS:  The risks, benefits, warnings,side effects and alternatives of medications discussed.  Inflammation/pain control; with cold, heat, elevation and/or liniments discussed as appropriate  Cortisone Injection. See procedure note.  MEDICAL RECORDS reviewed from other provider(s) for past and current medical history pertinent to this complaint.      3/31/2022    Dictated utilizing Dragon dictation

## 2022-03-31 ENCOUNTER — OFFICE VISIT (OUTPATIENT)
Dept: ORTHOPEDIC SURGERY | Facility: CLINIC | Age: 87
End: 2022-03-31

## 2022-03-31 ENCOUNTER — TELEPHONE (OUTPATIENT)
Dept: ORTHOPEDIC SURGERY | Facility: CLINIC | Age: 87
End: 2022-03-31
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
## 2022-03-31 DIAGNOSIS — M25.562 ACUTE PAIN OF LEFT KNEE: Primary | ICD-10-CM

## 2022-03-31 PROCEDURE — 20610 DRAIN/INJ JOINT/BURSA W/O US: CPT | Performed by: NURSE PRACTITIONER

## 2022-03-31 RX ORDER — METHYLPREDNISOLONE ACETATE 80 MG/ML
80 INJECTION, SUSPENSION INTRA-ARTICULAR; INTRALESIONAL; INTRAMUSCULAR; SOFT TISSUE
Status: COMPLETED | OUTPATIENT
Start: 2022-03-31 | End: 2022-03-31

## 2022-03-31 RX ADMIN — METHYLPREDNISOLONE ACETATE 80 MG: 80 INJECTION, SUSPENSION INTRA-ARTICULAR; INTRALESIONAL; INTRAMUSCULAR; SOFT TISSUE at 12:43

## 2022-03-31 NOTE — TELEPHONE ENCOUNTER
Spoke with pt regarding the appt that was scheduled today with [facility].  She was most recently seen on 3/28/22 & [facility] had mentioned the possibility of getting an MRI. Her granddaughter had called earlier to informed us that the pt would like to proceed with that but was told that would need to be seen again.  Order was placed today for MRI and they are aware someone will call them to set it.

## 2022-03-31 NOTE — PROGRESS NOTES
Patient was seen in the office on Monday with her granddaughter instructed to call with worsening symptoms and I would order an MRI.  Patient presented office today and is having pain with ambulating has not received knee pain relief with a cortisone injection advise it could take 2 weeks we will get an MRI to assess for tibial plateau fracture since her normal walking has been interrupted and is the pain with weightbearing she is in a wheelchair and has an immobilizer.

## 2022-04-21 ENCOUNTER — HOSPITAL ENCOUNTER (OUTPATIENT)
Dept: MRI IMAGING | Facility: HOSPITAL | Age: 87
Discharge: HOME | End: 2022-04-21
Admitting: NURSE PRACTITIONER

## 2022-04-21 ENCOUNTER — DOCUMENTATION (OUTPATIENT)
Dept: ORTHOPEDIC SURGERY | Facility: CLINIC | Age: 87
End: 2022-04-21

## 2022-04-21 ENCOUNTER — HOME HEALTH ADMISSION (OUTPATIENT)
Dept: HOME HEALTH SERVICES | Facility: HOME HEALTHCARE | Age: 87
End: 2022-04-21

## 2022-04-21 ENCOUNTER — TELEPHONE (OUTPATIENT)
Dept: ORTHOPEDIC SURGERY | Facility: CLINIC | Age: 87
End: 2022-04-21
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
## 2022-04-21 DIAGNOSIS — S72.92XD CLOSED FRACTURE OF LEFT FEMUR WITH ROUTINE HEALING, UNSPECIFIED FRACTURE MORPHOLOGY, UNSPECIFIED PORTION OF FEMUR, SUBSEQUENT ENCOUNTER: Primary | ICD-10-CM

## 2022-04-21 DIAGNOSIS — S72.402A CLOSED FRACTURE OF DISTAL END OF LEFT FEMUR, UNSPECIFIED FRACTURE MORPHOLOGY, INITIAL ENCOUNTER: Primary | ICD-10-CM

## 2022-04-21 DIAGNOSIS — M25.562 ACUTE PAIN OF LEFT KNEE: ICD-10-CM

## 2022-04-21 PROCEDURE — 73721 MRI JNT OF LWR EXTRE W/O DYE: CPT

## 2022-04-21 NOTE — TELEPHONE ENCOUNTER
[facility] spoke with pt regarding her MRI and she was informed it showed a fracture.  [facility] would like for her to come it to be fitted with a T-scope brace.  Pt stated she will come in this afternoon or sometime tomorrow.  She is aware if she comes in tomorrow she need to go to [address].  She will also need to be given a copy of her wheelchair order.

## 2022-04-21 NOTE — PROGRESS NOTES
Pt MRI today shows left distal femoral fracture - called pt and set up fitting her for a hinged knee brace and be non weight bearing with gentle range of motion and orders placed for wheelchair with leg extension, home health and physical therapy. Pt lives alone and was calling her son and granddaughter. She is in an immobilizer with a walker currently. Will come in office tomm. cim    Extensive bone marrow edema and some periosteal edema are  observed adjacent to an incomplete, transverse, distal left femoral  metaphysis fracture.     This report was finalized on 4/21/2022 1:09 PM by Dr. Tucker Cleveland M.D.

## 2022-04-22 ENCOUNTER — HOME CARE VISIT (OUTPATIENT)
Dept: HOME HEALTH SERVICES | Facility: HOME HEALTHCARE | Age: 87
End: 2022-04-22

## 2022-04-22 VITALS
DIASTOLIC BLOOD PRESSURE: 70 MMHG | HEART RATE: 63 BPM | RESPIRATION RATE: 18 BRPM | SYSTOLIC BLOOD PRESSURE: 110 MMHG | TEMPERATURE: 97.5 F | OXYGEN SATURATION: 98 %

## 2022-04-22 DIAGNOSIS — S72.402A CLOSED FRACTURE OF DISTAL END OF LEFT FEMUR, UNSPECIFIED FRACTURE MORPHOLOGY, INITIAL ENCOUNTER: Primary | ICD-10-CM

## 2022-04-22 PROCEDURE — G0151 HHCP-SERV OF PT,EA 15 MIN: HCPCS

## 2022-04-22 NOTE — HOME HEALTH
"SUBJECTIVE: \"We couldn't get the wheelchair or the knee brace the MD called us about when fracture was found on MRI. The MD office was closed today and [facility] needs more info before they can fulfill wc and brace orders,\" per patient's granddaughter    DIAGNOSIS:fell [id number] with left kne pain ever since with xrays negative initially, but had MRI yesterday and distal femur fracture was discovered    PAST MEDICAL HX: HTN, stage 3 CKD, anxiety disorder, insomnia, cardiac stent due 2 MI's, healed neck fracture 2018, Hyperlipidemia    PRIOR LEVEL OF FUNCTION: independent gait and ADL's and community mobility including driving    PLAN FOR NEXT VISIT: Continue gait and transfer training getting clarification from MD office NWB status vs TTWB vs PWB using rolling walker, wc mobility if obtains one with [facility] having to call MD office on Monday as office was closed today and [facility] needed more info on order so could not issue wc ot patient yet, HEP instruction for ROM/strengthening left leg as allowed by MD, and patient family education as needed for pain edema control, home safety and mobility assistance"

## 2022-04-25 ENCOUNTER — TELEPHONE (OUTPATIENT)
Dept: ORTHOPEDIC SURGERY | Facility: CLINIC | Age: 87
End: 2022-04-25

## 2022-04-25 DIAGNOSIS — S72.92XD CLOSED FRACTURE OF LEFT FEMUR WITH ROUTINE HEALING, UNSPECIFIED FRACTURE MORPHOLOGY, UNSPECIFIED PORTION OF FEMUR, SUBSEQUENT ENCOUNTER: Primary | ICD-10-CM

## 2022-04-25 NOTE — TELEPHONE ENCOUNTER
Patients family is calling about length of time for wearing the brace and if she should be sleeping in the brace when she can take it off and how long it will take to heal.

## 2022-04-25 NOTE — CASE COMMUNICATION
[facility] Note:    DIAGNOSIS: fell [id number] with left kne pain ever since with xrays negative initially, but had MRI yesterday and distal femur fracture was discovered   PAST MEDICAL HX: HTN, stage 3 CKD, anxiety disorder, insomnia, cardiac stent due 2 MI's, healed neck fracture 2018, Hyperlipidemia   PRIOR LEVEL OF FUNCTION: independent gait and ADL's and community mobility including driving   Skilled PT is needed 1wk1, 2wk4 for continu ed gait and transfer training needing clarification from MD office NWB status vs TDWB with rolling walker (which PT taught today as no wheelchair and would not be able to hop on 1 leg around home), [facility] having to call [facility] office on Monday as office was closed today and [facility] needed more info on MD order for insurance coverage so could not issue wc to patient yet, HEP instruction for ROM/strengthe rafa left leg as allowed by MD, and patient family education as needed for pain edema control, home safety and mobility assistance. OT also ordered for ADL assessment and treatment with wt bearing as allowed by MD

## 2022-04-26 ENCOUNTER — HOME CARE VISIT (OUTPATIENT)
Dept: HOME HEALTH SERVICES | Facility: HOME HEALTHCARE | Age: 87
End: 2022-04-26

## 2022-04-26 PROCEDURE — G0152 HHCP-SERV OF OT,EA 15 MIN: HCPCS

## 2022-04-27 ENCOUNTER — HOME CARE VISIT (OUTPATIENT)
Dept: HOME HEALTH SERVICES | Facility: HOME HEALTHCARE | Age: 87
End: 2022-04-27

## 2022-04-27 VITALS
SYSTOLIC BLOOD PRESSURE: 110 MMHG | RESPIRATION RATE: 18 BRPM | HEART RATE: 77 BPM | DIASTOLIC BLOOD PRESSURE: 74 MMHG | TEMPERATURE: 97.3 F | OXYGEN SATURATION: 96 %

## 2022-04-27 PROCEDURE — G0151 HHCP-SERV OF PT,EA 15 MIN: HCPCS

## 2022-04-27 NOTE — HOME HEALTH
Reason for Referral: Patient had a fall in home 3-18-22, went to ER, xray negative, patient continued to have knee pain, had MRI 4-21-22, found distal femur fracture    Medical History:HTN, Stage 3 CKD, Anxiety,Hyperlipidemia, Insomnia, MI, Neck fracture '18    Subjective:  Patient concerned why she still has not received LLE brace and wheelchair    Home Environment:  Patient lives alone, has supportive granddaughter who assists, has a borrowed transport chair for appts but unable to use by herself    PLOF: Independent with ADL's, IADL's, drove    Medical Necessity: Patient requires skilled occupational therapy for remediation of deficits to improve safety in home and improve self care, functional transfers, mobility, strength, endurance, DME/adaptive equipment    Plan for Next Visit:  Safe transfers, adl tasks    OT contacted [facility] during visit regarding leg brace and wheelchair, rep stated they will call patient back today after looking into it. OT recommeded to patient/granddaughter to follow up again prior to [facility] closing today to inquire again about wheelchair since patient is supposed to be NWB and lives alone.

## 2022-04-28 ENCOUNTER — HOME CARE VISIT (OUTPATIENT)
Dept: HOME HEALTH SERVICES | Facility: HOME HEALTHCARE | Age: 87
End: 2022-04-28

## 2022-04-28 VITALS
HEART RATE: 79 BPM | SYSTOLIC BLOOD PRESSURE: 122 MMHG | TEMPERATURE: 97.8 F | OXYGEN SATURATION: 94 % | DIASTOLIC BLOOD PRESSURE: 70 MMHG

## 2022-04-28 PROCEDURE — G0152 HHCP-SERV OF OT,EA 15 MIN: HCPCS

## 2022-04-29 ENCOUNTER — HOME CARE VISIT (OUTPATIENT)
Dept: HOME HEALTH SERVICES | Facility: HOME HEALTHCARE | Age: 87
End: 2022-04-29

## 2022-04-29 NOTE — CASE COMMUNICATION
Dear BOUCHRA HENSON    Re: Marilynn Gil  : [id number]     The PT visit on 429 for the above patient was missed due to patient refused as still waiting for wheelchair to be delivered for PT to work on NWB mobility with her, therefore, the prescribed frequency of visits was not met. Patient had told PT on last visit 427 that [facility] was supposed to have delivered it that morning and she was going to call [facility] to see. When talked t o her today, she said she didn't know what was going on but still no wheelchair and her grandaughter was calling [facility] to see what the hold-up/why not delivered yet.    If you have questions or would like further information about this patient, please contact us at [phone].    Regards, Baljeet Simons PT

## 2022-05-02 ENCOUNTER — TELEPHONE (OUTPATIENT)
Dept: ORTHOPEDIC SURGERY | Facility: CLINIC | Age: 87
End: 2022-05-02

## 2022-05-02 VITALS — TEMPERATURE: 97.3 F | HEART RATE: 79 BPM | OXYGEN SATURATION: 98 %

## 2022-05-02 NOTE — HOME HEALTH
Patient reports she still does not have brace/ wheelchair, [facility] said they were supposed to deliver this afternoon.  Plan to address adl's, transfers

## 2022-05-02 NOTE — TELEPHONE ENCOUNTER
"[place] [place] of t-scope brace order on Friday 4/29/22 at 3:45 pm. DX left femur FX. Called pt to fit at her home after work. When entered pts home she was ambulatory with rolling walker FWB on left with knee immob laying off to the side on a chair. Explained ROM knee brace to pt, when started fitting, pt adamantly refused to let me fit brace, stated \" it weights as much as me, I will not wear that I live alone with no help\". Stressed to pt importance of the brace and NWB status on left LE. Pt agreed to at least wear the knee immob and attempt NWB until I could speak to [facility] on Monday. Called pt this AM to reiterate use of knee immob and NWB until appt with [facility] on 5/9/22   "

## 2022-05-03 ENCOUNTER — HOME CARE VISIT (OUTPATIENT)
Dept: HOME HEALTH SERVICES | Facility: HOME HEALTHCARE | Age: 87
End: 2022-05-03

## 2022-05-03 ENCOUNTER — TELEPHONE (OUTPATIENT)
Dept: ORTHOPEDIC SURGERY | Facility: CLINIC | Age: 87
End: 2022-05-03

## 2022-05-03 VITALS
OXYGEN SATURATION: 96 % | SYSTOLIC BLOOD PRESSURE: 128 MMHG | TEMPERATURE: 97.5 F | HEART RATE: 80 BPM | RESPIRATION RATE: 18 BRPM | DIASTOLIC BLOOD PRESSURE: 70 MMHG

## 2022-05-03 PROCEDURE — G0152 HHCP-SERV OF OT,EA 15 MIN: HCPCS

## 2022-05-03 PROCEDURE — G0151 HHCP-SERV OF PT,EA 15 MIN: HCPCS

## 2022-05-03 NOTE — TELEPHONE ENCOUNTER
[facility] states the office note 4/21 should have the following included in the note not just on the order.    Pt cannot ambulate with cane or walker therefore needs wheelchair with leg extensions for daily activities in the home for femoral neck fracture    Please update the note so it can be faxed

## 2022-05-03 NOTE — HOME HEALTH
"Subjective: \"I can't always use the walker and the wheelchair is still not ordered.\" \"I don't like the immobilizer as it moves down alot.\"    Falls reported: none    Medication changes: none      Plan for next visit: Await feedback from MD office and see if wheelchair finally gets approved and delivered to patient, gait/transfer training with rolling walker as allowed, HEP reinstruction, and patient/family education"

## 2022-05-04 ENCOUNTER — DOCUMENTATION (OUTPATIENT)
Dept: ORTHOPEDIC SURGERY | Facility: CLINIC | Age: 87
End: 2022-05-04

## 2022-05-04 PROCEDURE — G0180 MD CERTIFICATION HHA PATIENT: HCPCS | Performed by: NURSE PRACTITIONER

## 2022-05-04 NOTE — PROGRESS NOTES
Patient was initially seen 3-31-22 had plain films of the knee and was seen back in the office as a walk-in MRI place found to have a distal femoral fracture she needs to have a wheelchair with leg extension.  Pt cannot ambulate with cane or walker therefore needs wheelchair with leg extensions for daily activities in the home for femoral neck fracture

## 2022-05-05 ENCOUNTER — HOME CARE VISIT (OUTPATIENT)
Dept: HOME HEALTH SERVICES | Facility: HOME HEALTHCARE | Age: 87
End: 2022-05-05

## 2022-05-05 VITALS
HEART RATE: 73 BPM | TEMPERATURE: 97.8 F | OXYGEN SATURATION: 97 % | SYSTOLIC BLOOD PRESSURE: 126 MMHG | DIASTOLIC BLOOD PRESSURE: 82 MMHG

## 2022-05-05 PROCEDURE — G0152 HHCP-SERV OF OT,EA 15 MIN: HCPCS

## 2022-05-05 NOTE — HOME HEALTH
Patient answered door without walker. Reports she still does not have wheelchair. Stated she woke up this morning with both legs hurting.  During visit, OT contacted MD office to follow up with wheelchair order- they stated it was sent multiple times, then called [facility] to follow up- they stated they did not have enough information needed for wheelchair order. Contacted MD office again, they plan to discuss with their [facility] rep. Also discussed patient's status with GALILEA Delong.

## 2022-05-09 ENCOUNTER — OFFICE VISIT (OUTPATIENT)
Dept: ORTHOPEDIC SURGERY | Facility: CLINIC | Age: 87
End: 2022-05-09

## 2022-05-09 VITALS
HEART RATE: 61 BPM | SYSTOLIC BLOOD PRESSURE: 110 MMHG | TEMPERATURE: 97.6 F | OXYGEN SATURATION: 95 % | DIASTOLIC BLOOD PRESSURE: 70 MMHG

## 2022-05-09 VITALS — BODY MASS INDEX: 19.37 KG/M2 | WEIGHT: 96.1 LBS | HEIGHT: 59 IN | TEMPERATURE: 97.4 F

## 2022-05-09 DIAGNOSIS — S72.92XD CLOSED FRACTURE OF LEFT FEMUR WITH ROUTINE HEALING, UNSPECIFIED FRACTURE MORPHOLOGY, UNSPECIFIED PORTION OF FEMUR, SUBSEQUENT ENCOUNTER: Primary | ICD-10-CM

## 2022-05-09 DIAGNOSIS — G47.00 INSOMNIA: ICD-10-CM

## 2022-05-09 PROCEDURE — 99213 OFFICE O/P EST LOW 20 MIN: CPT | Performed by: ORTHOPAEDIC SURGERY

## 2022-05-09 PROCEDURE — 73560 X-RAY EXAM OF KNEE 1 OR 2: CPT | Performed by: ORTHOPAEDIC SURGERY

## 2022-05-09 RX ORDER — ZOLPIDEM TARTRATE 10 MG/1
TABLET ORAL
Qty: 30 TABLET | Refills: 0 | Status: SHIPPED | OUTPATIENT
Start: 2022-05-09 | End: 2022-06-09

## 2022-05-09 NOTE — PROGRESS NOTES
Patient:Marilynn Gil    YOB: 1935    Medical Record Number:[id number]    Chief Complaints:  Referral for left knee injury    History of Present Illness:     87 y.o. female patient who presents for her left knee.  She was referred by Dania Escalante.  She fell on March 18 and injured her left knee.  An MRI was obtained which showed a distal femur fracture.  She was placed in a knee immobilizer.  She was told to remain nonweightbearing.  She admits that she has been doing a fair amount of walking around her house.  She lives alone and she says that she does not have a choice but to stand and walk from time to time.  She has a lot of carpet in her house and the wheelchair simply does not work well in her home.  She says that her pain is better than when her symptoms first started but she feels like the pain has been relatively stable over the past month or so.  She has not noticed much improvement over the past month.    Allergies:  Allergies   Allergen Reactions   • Codeine Nausea And Vomiting   • Gabapentin Other (See Comments)     Did not tolerate   • Lyrica [Pregabalin] Other (See Comments)     Did not tolerate   • Other Nausea And Vomiting     Darvocet   • Shellfish-Derived Products Other (See Comments)     Cant remember   • Sulfa Antibiotics Other (See Comments)     Can't remember       Home Medications:    Current Outpatient Medications:   •  Acetaminophen (TYLENOL PO), Take 500 mg by mouth Every 6 (Six) Hours As Needed (pain)., Disp: , Rfl:   •  aspirin 325 MG tablet, Take 325 mg by mouth daily., Disp: , Rfl:   •  Diclofenac Sodium (VOLTAREN) 1 % gel gel, Apply 4 g topically to the appropriate area as directed 4 (Four) Times a Day As Needed (pain)., Disp: 150 g, Rfl: 1  •  famotidine (Pepcid) 20 MG tablet, Take 20 mg by mouth Daily As Needed for Indigestion., Disp: , Rfl:   •  furosemide (LASIX) 20 MG tablet, Take 1 tablet by mouth Daily., Disp: 90 tablet, Rfl: 1  •  isosorbide mononitrate  (IMDUR) 60 MG 24 hr tablet, 1 tablet daily., Disp: , Rfl: 3  •  lisinopril (PRINIVIL,ZESTRIL) 5 MG tablet, Take 5 mg by mouth daily., Disp: , Rfl:   •  metoprolol tartrate (LOPRESSOR) 25 MG tablet, Take 1 tablet by mouth 2 (Two) Times a Day., Disp: 180 tablet, Rfl: 3  •  NITROSTAT 0.4 MG SL tablet, 1 tab sublingual every 5 minutes x 3 as needed for chest pressure, Disp: , Rfl: 0  •  potassium chloride (K-DUR,KLOR-CON) 10 MEQ CR tablet, TAKE 1 TABLET BY MOUTH DAILY, Disp: 90 tablet, Rfl: 1  •  prasugrel (EFFIENT) tablet, Take 5 mg by mouth daily., Disp: , Rfl:   •  rosuvastatin (CRESTOR) 10 MG tablet, Take 10 mg by mouth Daily., Disp: , Rfl:   •  zolpidem (AMBIEN) 10 MG tablet, TAKE 1/2 TO 1 TABLET BY MOUTH AT BEDTIME AS NEEDED, Disp: 30 tablet, Rfl: 0    Past Medical History:   Diagnosis Date   • Anxiety    • CAD in native artery    • Chronic kidney disease, stage 3 (HCC)    • GERD (gastroesophageal reflux disease)    • Hypercholesterolemia    • Hypertension    • Insomnia    • Mesenteric ischemia, chronic (HCC)    • Osteoporosis    • Pancreatitis    • Stroke (HCC)    • Venous thrombosis and embolism        Past Surgical History:   Procedure Laterality Date   • BACK SURGERY      x 4   • BREAST MASS EXCISION      Benign   • CATARACT EXTRACTION Bilateral    • CHOLECYSTECTOMY  08/2013   • COLONOSCOPY  12/2012    scarred mucosa,internal hemorrhoids   • COLONOSCOPY  09/12/2012    Patent side to side ileo colonic anastomosis, one 7mm polyp in the ascending colon, erythematous and vascular pattern decreased mucosa in the transverse colon, scarred mucosa in the rectum, NBIH.  PATH: Tubular adenoma, Patchy mild chronic inflammation    • CORONARY ANGIOPLASTY      MI 1985, s/p angioplasty 1987   • ENDOSCOPY  12/06/2013    z line irreg 38cm from the incisors, tortuous esophagus, HH, gastritis, bilious gastric fluid, duodenitis.  PATH: Gastric mucosa with minimal chornic infalmmation and with histologic features suggestive of  "reactive gastropathy.    • PANCREAS SURGERY  2013    Pancreatic duct stricture, stent placed   • RECTAL PROLAPSE REPAIR  2007    Rectopexy and sigmoidectomy, s/p rectal prolapse repair x 2 prior to    • SPHINCTEROTOMY  2013       Social History     Occupational History   • Occupation: NOT EMPLOYED   Tobacco Use   • Smoking status: Former Smoker     Packs/day: 2.00     Types: Cigarettes     Quit date:      Years since quittin.3   • Smokeless tobacco: Never Used   Substance and Sexual Activity   • Alcohol use: No   • Drug use: No   • Sexual activity: Defer      Social History     Social History Narrative    LIVES ALONE       Family History   Problem Relation Age of Onset   • Cancer Brother    • Pancreatitis Paternal Aunt    • Diabetes Father        Review of Systems:      Constitutional: Denies fever, shaking or chills   Eyes: Denies change in visual acuity   HEENT: Denies nasal congestion or sore throat   Respiratory: Denies cough or shortness of breath   Cardiovascular: Denies chest pain or edema  Endocrine: Denies tremors, palpitations, intolerance of heat or cold, polyuria, polydipsia.  GI: Denies abdominal pain, nausea, vomiting, bloody stools or diarrhea  : Denies frequency, urgency, incontinence, retention, or nocturia.  Musculoskeletal: Denies numbness, tingling or loss of motor function except as above  Integument: Denies rash, lesion or ulceration   Neurologic: Denies headache or focal weakness, deficits  Heme: Denies spontaneous or excessive bleeding, epistaxis, hematuria, melena, fatigue, enlarged or tender lymph nodes.      All other pertinent positives and negatives as noted above in HPI.    Physical Exam:87 y.o. female  Vitals:    22 0927   Temp: 97.4 °F (36.3 °C)   Weight: 43.6 kg (96 lb 1.6 oz)   Height: 149.9 cm (59\")     General:  Patient is awake and alert.  Appears in no acute distress or discomfort.    Psych:  Affect and demeanor are appropriate.    Extremities: Her " left knee is examined.  Skin is benign.  She is mildly tender over the distal femur.  No step-offs or palpable defects.  She can actively extend her knee.  She can flex to about 95 to 95 degrees.  Calf is soft.  Intact motor and sensory function in her lower leg and foot.    Imaging: AP and lateral views left knee are ordered and reviewed today to evaluate her injury.  These are compared to her previous x-rays.  I have also gone back and looked at her MRI of the left knee along with the radiology report.  The x-rays today show some sclerosis along the distal femur consistent with healing of the fracture.  Her previous x-rays did not show any evidence for fracture but the MRI does confirm a nondisplaced fracture through the distal metaphysis of the femur.    Assessment/Plan: Nondisplaced left distal femur fracture    Her x-rays suggest to me that the fracture is healing.  I suspect that the reason she is still having a fair amount of discomfort is because she is still doing quite a bit around her house as far as standing and walking.  I want her to come out of the knee immobilizer this time and start working on progressive range of motion.  I demonstrated home exercises for her.  I want her to try to avoid any weightbearing around the house as best as possible.  Were going to give it another month of protected weightbearing and then I will see her back.  If she is still not significantly improved at that time then I may refer her for an updated MRI.    Vishal Adamson MD    05/09/2022

## 2022-05-09 NOTE — HOME HEALTH
Patient still does not have wheelchair, OT contacted [facility] again, stated they received documentation from MD and plan to reach out to patient to schedule.  Plan to address safety with adl tasks from wheelchair level.

## 2022-05-10 ENCOUNTER — HOME CARE VISIT (OUTPATIENT)
Dept: HOME HEALTH SERVICES | Facility: HOME HEALTHCARE | Age: 87
End: 2022-05-10

## 2022-05-10 VITALS
SYSTOLIC BLOOD PRESSURE: 108 MMHG | HEART RATE: 69 BPM | OXYGEN SATURATION: 96 % | TEMPERATURE: 97.4 F | DIASTOLIC BLOOD PRESSURE: 66 MMHG

## 2022-05-10 PROCEDURE — G0152 HHCP-SERV OF OT,EA 15 MIN: HCPCS

## 2022-05-10 NOTE — HOME HEALTH
Patient reported [facility] delivered wheelchair but she did not accept delivery because they were charging her for it. Received a borrowed narrow wheelchair from sister in law. Patient also stated she saw  yesterday.  Plan to address safety with bathroom transfers, kitchen tasks, safety with wheelchair, plan possible discharge.

## 2022-05-12 ENCOUNTER — HOME CARE VISIT (OUTPATIENT)
Dept: HOME HEALTH SERVICES | Facility: HOME HEALTHCARE | Age: 87
End: 2022-05-12

## 2022-05-12 VITALS — SYSTOLIC BLOOD PRESSURE: 120 MMHG | DIASTOLIC BLOOD PRESSURE: 66 MMHG | HEART RATE: 62 BPM | OXYGEN SATURATION: 93 %

## 2022-05-12 PROCEDURE — G0151 HHCP-SERV OF PT,EA 15 MIN: HCPCS

## 2022-05-12 NOTE — HOME HEALTH
"Subjective: \"I will try to use the wheelchair more often.\"    Falls reported: none    Medication changes: none      Plan for next visit: Continue wheelchair mobility and set up for transfers, transfer training, ROM/strength exercises left knee, gait with limited wt bearing with walker to be limited in attempts, and patient education as needed"

## 2022-05-13 ENCOUNTER — HOME CARE VISIT (OUTPATIENT)
Dept: HOME HEALTH SERVICES | Facility: HOME HEALTHCARE | Age: 87
End: 2022-05-13

## 2022-05-13 PROCEDURE — G0152 HHCP-SERV OF OT,EA 15 MIN: HCPCS

## 2022-05-16 VITALS
OXYGEN SATURATION: 92 % | DIASTOLIC BLOOD PRESSURE: 64 MMHG | TEMPERATURE: 97.3 F | SYSTOLIC BLOOD PRESSURE: 102 MMHG | HEART RATE: 66 BPM

## 2022-05-16 NOTE — HOME HEALTH
Patient answered door without walker or wheelchair, reports she tries to not walk much, has been trying to use wheelchair.  Patient in agreement with OT discharge today.

## 2022-05-17 ENCOUNTER — HOME CARE VISIT (OUTPATIENT)
Dept: HOME HEALTH SERVICES | Facility: HOME HEALTHCARE | Age: 87
End: 2022-05-17

## 2022-05-17 VITALS
TEMPERATURE: 97.3 F | HEART RATE: 76 BPM | OXYGEN SATURATION: 93 % | DIASTOLIC BLOOD PRESSURE: 70 MMHG | SYSTOLIC BLOOD PRESSURE: 116 MMHG | RESPIRATION RATE: 18 BRPM

## 2022-05-17 PROCEDURE — G0151 HHCP-SERV OF PT,EA 15 MIN: HCPCS

## 2022-05-17 NOTE — HOME HEALTH
"Subjective: \"The knee varies in pain but hurts more when I do alot of walking on it.\"    Falls reported: none    Medication changes: none    Plan for next visit: Continue HEP training for ROM/strength left knee, wc mobility and using it more often rather than walking as she leaves walker behind as well, and patient education; may hold PT afte next visit vs dc as returns to MD [id number] and await advancement of PT orders at that time-will send report to Dr Adamson/BOUCHRA HENSON"

## 2022-05-19 ENCOUNTER — HOME CARE VISIT (OUTPATIENT)
Dept: HOME HEALTH SERVICES | Facility: HOME HEALTHCARE | Age: 87
End: 2022-05-19

## 2022-05-19 VITALS
SYSTOLIC BLOOD PRESSURE: 122 MMHG | TEMPERATURE: 97.3 F | OXYGEN SATURATION: 93 % | DIASTOLIC BLOOD PRESSURE: 70 MMHG | RESPIRATION RATE: 18 BRPM | HEART RATE: 62 BPM

## 2022-05-19 PROCEDURE — G0151 HHCP-SERV OF PT,EA 15 MIN: HCPCS

## 2022-05-19 NOTE — HOME HEALTH
"Subjective: \"it's just easier to walk than push wheelchair thru the carpet and my knee hurts but not like it was at beginning before they found the fracture.\"    Falls reported: none    Medication changes: none"

## 2022-05-20 NOTE — CASE COMMUNICATION
"Home PT/agency discharge today. She returns to your office [id number]. She did get borrowed wheelchair from family but she chooses to mostly walk thru her condo without device, so did not follow Dr Adamson's recommendation to limit walking as she reports \"its just too hard to get wheelchair into rooms and it doesn't hurt my leg like it did a month ago, so it's easier to walk.\" HEP issued to her with handout for supine/sitting left knee ROM/ strength for extension/flexion with variable compliance."

## 2022-06-06 ENCOUNTER — OFFICE VISIT (OUTPATIENT)
Dept: ORTHOPEDIC SURGERY | Facility: CLINIC | Age: 87
End: 2022-06-06

## 2022-06-06 VITALS — WEIGHT: 96 LBS | HEIGHT: 59 IN | TEMPERATURE: 98 F | BODY MASS INDEX: 19.35 KG/M2

## 2022-06-06 DIAGNOSIS — S72.402A CLOSED FRACTURE OF DISTAL END OF LEFT FEMUR, UNSPECIFIED FRACTURE MORPHOLOGY, INITIAL ENCOUNTER: Primary | ICD-10-CM

## 2022-06-06 DIAGNOSIS — G47.00 INSOMNIA: ICD-10-CM

## 2022-06-06 PROCEDURE — 73560 X-RAY EXAM OF KNEE 1 OR 2: CPT | Performed by: ORTHOPAEDIC SURGERY

## 2022-06-06 PROCEDURE — 99213 OFFICE O/P EST LOW 20 MIN: CPT | Performed by: ORTHOPAEDIC SURGERY

## 2022-06-06 NOTE — PROGRESS NOTES
CC: Left distal femur fracture    Ms. Gil follows up for the left distal femur fracture.  She reports that she has been working on the range of motion.  She says that is better but not back to full.  Overall, she says that her pain is improved but still not resolved.  She admits that she has not been entirely compliant with the weightbearing restrictions.  She tells me the walker does not really fit in her home and so when she gets up to go to the bathroom, etc., she typically walks without any assist device.  She says she is able to do so with only mild discomfort.    She is still little tender over the distal femur but there are no step-offs or palpable defects.  Her tenderness is certainly not exquisite.  She can fully extend the knee.  Flexion is to 110 degrees.  Calf is soft.    AP and lateral views of the left knee are ordered and reviewed to evaluate the fracture.  These are compared to her previous x-rays.  I can see sclerosis in the distal femur consistent with healing of the fracture.  No fracture line is visible.  No concerning findings noted.    Assessment: 2-month status post closed treatment of left distal femur fracture    Plan: I am going to let her progress her weightbearing as tolerated.  I encouraged her to keep working on her knee flexion as there is still a lot of room for improvement.  I offered to have home health PT come back out but she says that she would prefer to work on it on her own.  I am going to see her back in another month.    Vishal Adamson MD

## 2022-06-09 RX ORDER — ZOLPIDEM TARTRATE 10 MG/1
TABLET ORAL
Qty: 30 TABLET | Refills: 0 | Status: SHIPPED | OUTPATIENT
Start: 2022-06-09 | End: 2022-07-07

## 2022-07-06 ENCOUNTER — OFFICE VISIT (OUTPATIENT)
Dept: ORTHOPEDIC SURGERY | Facility: CLINIC | Age: 87
End: 2022-07-06

## 2022-07-06 VITALS — BODY MASS INDEX: 18.14 KG/M2 | HEIGHT: 59 IN | TEMPERATURE: 97.4 F | WEIGHT: 90 LBS

## 2022-07-06 DIAGNOSIS — R10.2 PELVIC PAIN: ICD-10-CM

## 2022-07-06 DIAGNOSIS — Z09 FRACTURE FOLLOW-UP: ICD-10-CM

## 2022-07-06 DIAGNOSIS — S72.402A CLOSED FRACTURE OF DISTAL END OF LEFT FEMUR, UNSPECIFIED FRACTURE MORPHOLOGY, INITIAL ENCOUNTER: Primary | ICD-10-CM

## 2022-07-06 PROCEDURE — 72170 X-RAY EXAM OF PELVIS: CPT | Performed by: ORTHOPAEDIC SURGERY

## 2022-07-06 PROCEDURE — 73560 X-RAY EXAM OF KNEE 1 OR 2: CPT | Performed by: ORTHOPAEDIC SURGERY

## 2022-07-06 PROCEDURE — 99213 OFFICE O/P EST LOW 20 MIN: CPT | Performed by: ORTHOPAEDIC SURGERY

## 2022-07-06 NOTE — PROGRESS NOTES
CC:  Follow up left distal femur fracture    Ms. Gil follows up for her left knee.  She comes in with her daughter.  She says the knee is not much better.  She has been walking but she is still having significant medial sided knee pain.  She also says that she has been having some intermittent pain on the right hip.  She is frustrated that she is not significantly better.    Her right hip is briefly examined.  No significant tenderness.  No masses.  Hip motion is well-tolerated.  Mild discomfort with a Stinchfield maneuver.    Left knee is examined.  Skin is benign.  No swelling.  Mild tenderness over the medial epicondyle of the distal femur but no step-offs or palpable defects.  Her knee motion is excellent.  Resisted knee extension is mildly uncomfortable.    AP pelvis is ordered and reviewed to evaluate her hip pain.  She has moderate low lumbar degenerative disc disease.  No significant hip arthritis.  No acute abnormalities, lesions, masses or other concerning findings.  I do not have any comparison films available.    AP and lateral views left knee are also ordered and reviewed evaluate her complaint.  The fracture is not healed on the x-rays.  No concerning findings noted.  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    Assessment: Nondisplaced left distal femur fracture    Plan: She is now 3 months into treatment here and she reports minimal improvement in her pain.  I am going to send her for updated imaging of the knee.  I told her I will call her once I see the results of the MRI.  In the meantime, I am okay with her continuing to bear weight as tolerated.    I do not see any problems with her hip but we will need to keep an eye on that.  I told her I may need to refer her to one of my partners at some point if the pain persists.    Vishal Adamson MD

## 2022-07-07 DIAGNOSIS — G47.00 INSOMNIA: ICD-10-CM

## 2022-07-07 RX ORDER — ZOLPIDEM TARTRATE 10 MG/1
TABLET ORAL
Qty: 30 TABLET | Refills: 0 | Status: SHIPPED | OUTPATIENT
Start: 2022-07-07 | End: 2022-08-10 | Stop reason: SDUPTHER

## 2022-07-14 ENCOUNTER — HOSPITAL ENCOUNTER (OUTPATIENT)
Dept: MRI IMAGING | Facility: HOSPITAL | Age: 87
Discharge: HOME | End: 2022-07-14
Admitting: ORTHOPAEDIC SURGERY

## 2022-07-14 DIAGNOSIS — S72.402A CLOSED FRACTURE OF DISTAL END OF LEFT FEMUR, UNSPECIFIED FRACTURE MORPHOLOGY, INITIAL ENCOUNTER: ICD-10-CM

## 2022-07-14 PROCEDURE — 73721 MRI JNT OF LWR EXTRE W/O DYE: CPT

## 2022-07-15 ENCOUNTER — TELEPHONE (OUTPATIENT)
Dept: ORTHOPEDIC SURGERY | Facility: CLINIC | Age: 87
End: 2022-07-15

## 2022-07-15 NOTE — TELEPHONE ENCOUNTER
I called and spoke with her.  I gave her the MRI results.  It does appear that her fracture is healing.    She mentions that she has chronic back pain and she even gets some occasional shooting pain down the leg.  It may be that this is actually coming from her back.  We discussed referring her to a back specialist or perhaps referring her for further work-up of the back.  She says she is not interested in that at this time.  She says that at 87 years of age, she is not interested in pursuing intervention for the back unless absolutely necessary.  She would like to give the knee a little more time.  I think that is fine.  I will have my office make her an appointment to come see me again in about 6 weeks and check her progress.

## 2022-07-25 RX ORDER — FUROSEMIDE 20 MG/1
20 TABLET ORAL DAILY
Qty: 90 TABLET | Refills: 1 | Status: SHIPPED | OUTPATIENT
Start: 2022-07-25 | End: 2022-10-11 | Stop reason: SDUPTHER

## 2022-08-01 RX ORDER — POTASSIUM CHLORIDE 750 MG/1
10 TABLET, EXTENDED RELEASE ORAL DAILY
Qty: 90 TABLET | Refills: 1 | Status: SHIPPED | OUTPATIENT
Start: 2022-08-01 | End: 2023-02-13

## 2022-08-03 DIAGNOSIS — G47.00 INSOMNIA: ICD-10-CM

## 2022-08-03 RX ORDER — ZOLPIDEM TARTRATE 10 MG/1
TABLET ORAL
Qty: 30 TABLET | OUTPATIENT
Start: 2022-08-03

## 2022-08-03 NOTE — TELEPHONE ENCOUNTER
PATIENTS GRANDDAUGHTER MARTINA STATES SHE IS THE ONLY ONE LOCAL TO HELP PATIENT SO SHE HAS HAD A LOT ON HER PLATE WITH PATIENT BREAKING HER LEG AND HAVING ALL THOSE APTS ETC. PATIENT HAS MEMORY PROBLEMS SO SHE HAD REQUESTED THAT THE APT REMINDERS COME TO HER BUT FOR SOME REASON THEY DIDN'T SO IF THEY WENT TO PATIENT WITH HER MEMORY SHE WOULD HAVE FORGOT AND THAT IS WHY THE APT WAS MISSED. MARTINA IS ATTEMPTING TO RESCHEDULE IT NOW BUT THE NEXT AVAILABLE ISN'T UNTIL 09/2 WITH GOR[facility], PATIENT WAS HAPPY WITH DAVIDA AND IS FINE WITH BEING HER PATIENT INSTEAD THEY HAD ONLY MADE AN APT WITH DR. ZIMMER BECAUSE THEY WERE TOLD THAT'S WHO SHE HAD TO SEE THAT WAS TAKING OVER FOR DR. MCUNLTY. .. SO PATIENT IS SCHEDULED FOR 08/12 WITH DAVIDA     HOWEVER; SHE'S BEEN ON THIS MED FOR YEARS SINCE SHE HAD A HEART ATTACK BECAUSE SHE HAS A REALLY ROUGH TIME SLEEPING. WITHOUT IT SHE WILL NOT GET SLEEP AT ALL.    MARTINA WOULD LIKE TO KNOW IF DAVIDA CAN REFILL JUST ENOUGH TO LAST TILL PATIENTS APT WITH HER ON 08/12?     MARTINA> [phone]

## 2022-08-03 NOTE — TELEPHONE ENCOUNTER
Patient's appt on 8/2/22 was to establish care with new provider. I've only seen this patient once and it was for hospital f/u before she had established with katie. She needs to establish with a provider in office and will need contract signed. I am refusing to fill this today. Thanks.

## 2022-08-05 ENCOUNTER — TELEPHONE (OUTPATIENT)
Dept: INTERNAL MEDICINE | Facility: CLINIC | Age: 87
End: 2022-08-05

## 2022-08-10 ENCOUNTER — OFFICE VISIT (OUTPATIENT)
Dept: INTERNAL MEDICINE | Facility: CLINIC | Age: 87
End: 2022-08-10

## 2022-08-10 VITALS
BODY MASS INDEX: 20.2 KG/M2 | HEART RATE: 70 BPM | TEMPERATURE: 97.2 F | WEIGHT: 100 LBS | OXYGEN SATURATION: 91 % | DIASTOLIC BLOOD PRESSURE: 62 MMHG | SYSTOLIC BLOOD PRESSURE: 138 MMHG

## 2022-08-10 DIAGNOSIS — K21.9 GASTROESOPHAGEAL REFLUX DISEASE WITHOUT ESOPHAGITIS: ICD-10-CM

## 2022-08-10 DIAGNOSIS — M25.512 CHRONIC LEFT SHOULDER PAIN: Primary | ICD-10-CM

## 2022-08-10 DIAGNOSIS — N18.4 CKD (CHRONIC KIDNEY DISEASE) STAGE 4, GFR 15-29 ML/MIN: ICD-10-CM

## 2022-08-10 DIAGNOSIS — G89.29 CHRONIC LEFT SHOULDER PAIN: Primary | ICD-10-CM

## 2022-08-10 DIAGNOSIS — G47.00 INSOMNIA: ICD-10-CM

## 2022-08-10 PROCEDURE — 99214 OFFICE O/P EST MOD 30 MIN: CPT | Performed by: NURSE PRACTITIONER

## 2022-08-10 RX ORDER — ZOLPIDEM TARTRATE 10 MG/1
10 TABLET ORAL NIGHTLY PRN
Qty: 30 TABLET | Refills: 0 | Status: SHIPPED | OUTPATIENT
Start: 2022-08-10 | End: 2022-08-12 | Stop reason: SDUPTHER

## 2022-08-10 RX ORDER — PANTOPRAZOLE SODIUM 40 MG/1
40 TABLET, DELAYED RELEASE ORAL DAILY
COMMUNITY
End: 2022-08-10 | Stop reason: SDUPTHER

## 2022-08-10 RX ORDER — PANTOPRAZOLE SODIUM 40 MG/1
40 TABLET, DELAYED RELEASE ORAL DAILY
Qty: 90 TABLET | Refills: 1 | Status: SHIPPED | OUTPATIENT
Start: 2022-08-10 | End: 2023-02-13

## 2022-08-10 RX ORDER — ASPIRIN 81 MG/1
81 TABLET, CHEWABLE ORAL DAILY
COMMUNITY

## 2022-08-10 RX ORDER — METHYLPREDNISOLONE 4 MG/1
TABLET ORAL
Qty: 21 EACH | Refills: 0 | Status: SHIPPED | OUTPATIENT
Start: 2022-08-10 | End: 2022-10-11

## 2022-08-10 NOTE — PROGRESS NOTES
"Chief Complaint  Insomnia    Subjective        Marilynn Gil presents to [facility]  History of Present Illness  This is an 88 y/o female presenting to office for complaints of left shoulder pain and insomnia. Patient is here with family member. Currently, patient lives alone.     Patient reports her left posterior shoulder has began hurting over the past couple weeks. Patient reports taking tylenol PRN for pain. Patient reports hx of cervical trauma; Patient reports using a walker recently due to leg injury/surgery. Patient reports she stopped using walker 2 weeks ago. Patient reports she did have some shoulder/arm discomfort when using the walker. Patient would like to wait on XR unless pain does not improve. We discussed using voltaren gel and medrol to see if we can get some improvement on her pain.     Patient also requesting refill of ambien-- last fill 7/7/22. Patient needs refill today. Reports not being able to sleep without this medication. PDMP reviewed today.     Patient also has hx of CKD stage 3-4-- patient is agreeable to see nephrologist but would prefer to see different provider.   Patient also follows with Dr. Valerio for cardiology needs.   Patient also following with Dr. Adamson for left distal femur fracture-- had surgery; completed PT.     Objective   Vital Signs:  /62 (BP Location: Left arm, Patient Position: Sitting, Cuff Size: Adult)   Pulse 70   Temp 97.2 °F (36.2 °C) (Tympanic)   Wt 45.4 kg (100 lb)   SpO2 91%   BMI 20.20 kg/m²   Estimated body mass index is 20.2 kg/m² as calculated from the following:    Height as of 7/6/22: 149.9 cm (59\").    Weight as of this encounter: 45.4 kg (100 lb).    BMI is within normal parameters. No other follow-up for BMI required.      Physical Exam  Vitals and nursing note reviewed.   Constitutional:       Appearance: Normal appearance.   HENT:      Head: Normocephalic.   Eyes:      Pupils: Pupils are equal, " round, and reactive to light.      Comments: +glasses   Cardiovascular:      Rate and Rhythm: Normal rate and regular rhythm.      Pulses: Normal pulses.      Heart sounds: Normal heart sounds. No murmur heard.    No friction rub. No gallop.   Pulmonary:      Effort: Pulmonary effort is normal. No respiratory distress.      Breath sounds: Normal breath sounds. No wheezing or rales.   Musculoskeletal:         General: Tenderness and signs of injury present.      Left shoulder: Tenderness present. Decreased range of motion.      Cervical back: Normal range of motion and neck supple.   Skin:     General: Skin is warm and dry.   Neurological:      Mental Status: She is alert and oriented to person, place, and time. Mental status is at baseline.   Psychiatric:         Mood and Affect: Mood normal.         Thought Content: Thought content normal.        Result Review :  The following data was reviewed by: NATIVIDAD Hernandez on 08/10/2022:  Common labs    Common Labsle 12/14/21 12/14/21 12/14/21    0000 0000 0000   Glucose 99     BUN 27     Creatinine 1.99 (A)     eGFR Non  Am 23 (A)     eGFR African Am 26 (A)     Sodium 139     Potassium 5.3 (A)     Chloride 101     Calcium 8.7     Total Protein 6.4     Albumin 3.6     Total Bilirubin 0.3     Alkaline Phosphatase 140 (A)     AST (SGOT) 21     ALT (SGPT) 14     Total Cholesterol  173    Triglycerides  125    HDL Cholesterol  65    LDL Cholesterol   86    Hemoglobin A1C   6.2 (A)   (A) Abnormal value       Comments are available for some flowsheets but are not being displayed.                    Assessment and Plan   Diagnoses and all orders for this visit:    1. Chronic left shoulder pain (Primary)  Assessment & Plan:  Voltaren gel to area.   Medrol pack as ordered.   Ice/heat to area prn.   Continue with some light exercises to area.   Patient to call office if pain does not improve then we can proceed with imaging.     Orders:  -     methylPREDNISolone (MEDROL) 4  MG dose pack; Take as directed on package instructions.  Dispense: 21 each; Refill: 0    2. Insomnia  Assessment & Plan:  PDMP reviewed.   Contract to be signed at next visit.   Refill ambien.   Discussed risks versus benefits.     Orders:  -     zolpidem (AMBIEN) 10 MG tablet; Take 1 tablet by mouth At Night As Needed for Sleep.  Dispense: 30 tablet; Refill: 0    3. Gastroesophageal reflux disease without esophagitis  Assessment & Plan:  Protonix reordered.     Orders:  -     pantoprazole (PROTONIX) 40 MG EC tablet; Take 1 tablet by mouth Daily.  Dispense: 90 tablet; Refill: 1    4. CKD (chronic kidney disease) stage 4, GFR 15-29 ml/min ([facility])  Assessment & Plan:  Continue with hydration-- 8 glasses h20 daily.   Avoid NSAIDS or other nephrotoxic medications.   Referral placed for nephrology.     Orders:  -     Ambulatory Referral to Nephrology           Follow Up   Return in about 2 months (around 10/10/2022) for Medicare Wellness.  Patient was given instructions and counseling regarding her condition or for health maintenance advice. Please see specific information pulled into the AVS if appropriate.

## 2022-08-10 NOTE — ASSESSMENT & PLAN NOTE
Voltaren gel to area.   Medrol pack as ordered.   Ice/heat to area prn.   Continue with some light exercises to area.   Patient to call office if pain does not improve then we can proceed with imaging.

## 2022-08-10 NOTE — ASSESSMENT & PLAN NOTE
PDMP reviewed.   Contract to be signed at next visit.   Refill ambien.   Discussed risks versus benefits.

## 2022-08-10 NOTE — ASSESSMENT & PLAN NOTE
Continue with hydration-- 8 glasses h20 daily.   Avoid NSAIDS or other nephrotoxic medications.   Referral placed for nephrology.

## 2022-08-12 DIAGNOSIS — G47.00 INSOMNIA: ICD-10-CM

## 2022-08-12 RX ORDER — ZOLPIDEM TARTRATE 10 MG/1
10 TABLET ORAL NIGHTLY PRN
Qty: 30 TABLET | Refills: 0 | Status: SHIPPED | OUTPATIENT
Start: 2022-08-12 | End: 2022-10-04

## 2022-08-26 ENCOUNTER — OFFICE VISIT (OUTPATIENT)
Dept: ORTHOPEDIC SURGERY | Facility: CLINIC | Age: 87
End: 2022-08-26

## 2022-08-26 VITALS — BODY MASS INDEX: 20.22 KG/M2 | HEIGHT: 59 IN | TEMPERATURE: 96.9 F | WEIGHT: 100.3 LBS

## 2022-08-26 DIAGNOSIS — M19.019 ARTHRITIS OF SHOULDER: ICD-10-CM

## 2022-08-26 DIAGNOSIS — Z09 FRACTURE FOLLOW-UP: ICD-10-CM

## 2022-08-26 DIAGNOSIS — S72.402A CLOSED FRACTURE OF DISTAL END OF LEFT FEMUR, UNSPECIFIED FRACTURE MORPHOLOGY, INITIAL ENCOUNTER: Primary | ICD-10-CM

## 2022-08-26 PROCEDURE — 99214 OFFICE O/P EST MOD 30 MIN: CPT | Performed by: ORTHOPAEDIC SURGERY

## 2022-08-26 PROCEDURE — 73030 X-RAY EXAM OF SHOULDER: CPT | Performed by: ORTHOPAEDIC SURGERY

## 2022-08-26 PROCEDURE — 73552 X-RAY EXAM OF FEMUR 2/>: CPT | Performed by: ORTHOPAEDIC SURGERY

## 2022-08-26 PROCEDURE — 20610 DRAIN/INJ JOINT/BURSA W/O US: CPT | Performed by: ORTHOPAEDIC SURGERY

## 2022-08-26 RX ORDER — METHYLPREDNISOLONE ACETATE 80 MG/ML
80 INJECTION, SUSPENSION INTRA-ARTICULAR; INTRALESIONAL; INTRAMUSCULAR; SOFT TISSUE
Status: COMPLETED | OUTPATIENT
Start: 2022-08-26 | End: 2022-08-26

## 2022-08-26 RX ADMIN — METHYLPREDNISOLONE ACETATE 80 MG: 80 INJECTION, SUSPENSION INTRA-ARTICULAR; INTRALESIONAL; INTRAMUSCULAR; SOFT TISSUE at 11:30

## 2022-08-26 NOTE — PROGRESS NOTES
Chief Complaint: Follow-up regarding closed treatment of left distal femur fracture, new complaint of left shoulder pain    HPI:  Ms. Gil follows up for her left knee.  She continues to have pain.  She says it may be a little better but not much.  She does admit that she is walking much better.  She feels like the knee is stiff.  Her primary complaint today is actually her left shoulder.  This has been a problem for years.  She says the pain has gotten much worse over the past few months.  Describes her pain as moderate, constant and aching.  She reports limited motion and the feeling of occasional grinding in the shoulder.    Exam: Her left shoulder is examined.  Skin is benign.  Mild to moderate tenderness anteriorly.  No effusion.  No increased warmth.  Her active and passive shoulder motion are limited.  I could raise her up to about 110 degrees of forward elevation, 70 degrees of abduction, 40 degrees of external rotation and she can internally rotate to roughly her back pocket.  She does have crepitus with range of motion.  Her deltoid fires but is weak.  It is difficult to get a good assessment of her cuff strength due to pain and guarding.    Left knee is examined.  Skin is benign.  She is still just mildly tender over the distal femur.  No palpable defect or step-off.  No palpable masses or other palpable abnormality.  She can fully extend the knee.  Flexion is to about 110 degrees.    Imaging: AP and lateral views of the left femur are ordered and reviewed.  These are compared to her previous x-rays.  No changes relative to previous films.  No concerning findings.  I did review her recent MRI of the left distal femur and compared that to the previous MRI.  I have independently interpreted the images.  It does appear to me that there is less edema in the bone relative to previous films.  It looks like the nondisplaced fracture is getting better and the radiologist agrees with that assessment.    AP,  scapular Y and axillary views left shoulder are ordered and reviewed to evaluate her complaint of shoulder pain.  No comparison films are available.  She has what appears to be severe glenohumeral osteoarthritis with bone-on-bone and glenoid erosion.  Acromiohumeral interval measures normal.    Assessment: 1.  Healing left distal femur fracture with mild knee arthrofibrosis 2.  Left shoulder osteoarthritis    Plan: First of all, her femur fracture seems to be healing.  She is still having a fair amount of pain but her recent looks better than the previous study.  I think we just need to give that more time.  I offered to refer her to pain management but she declined.  I do think she could benefit from PT to address the stiffness in her knee.  I gave her a referral for therapy.    We discussed options for her left shoulder.  She elected for an injection today.  The risk, benefits and alternatives were discussed including her elevated risk with the anticoagulation.  She acknowledged understanding and consented.  The injection was performed as described below.  I told her we can repeat the injection in 3 months or as needed thereafter.  I would like to see her back in 3 months to check her knee regardless of how the shoulder is doing.    Vishal Adamson MD  08/26/2022    Large Joint Arthrocentesis: L glenohumeral  Date/Time: 8/26/2022 11:30 AM  Consent given by: patient  Site marked: site marked  Timeout: Immediately prior to procedure a time out was called to verify the correct patient, procedure, equipment, support staff and site/side marked as required   Supporting Documentation  Indications: pain   Procedure Details  Location: shoulder - L glenohumeral  Preparation: Patient was prepped and draped in the usual sterile fashion  Needle gauge: 21G.  Approach: posterior  Medications administered: 80 mg methylPREDNISolone acetate 80 MG/ML; 2 mL lidocaine (cardiac)  Patient tolerance: patient tolerated the procedure  well with no immediate complications

## 2022-10-03 DIAGNOSIS — G47.00 INSOMNIA: ICD-10-CM

## 2022-10-04 RX ORDER — ZOLPIDEM TARTRATE 10 MG/1
10 TABLET ORAL NIGHTLY PRN
Qty: 30 TABLET | Refills: 0 | Status: SHIPPED | OUTPATIENT
Start: 2022-10-04 | End: 2022-11-01

## 2022-10-11 ENCOUNTER — OFFICE VISIT (OUTPATIENT)
Dept: INTERNAL MEDICINE | Facility: CLINIC | Age: 87
End: 2022-10-11

## 2022-10-11 VITALS
SYSTOLIC BLOOD PRESSURE: 110 MMHG | BODY MASS INDEX: 20.36 KG/M2 | HEIGHT: 59 IN | DIASTOLIC BLOOD PRESSURE: 80 MMHG | HEART RATE: 74 BPM | TEMPERATURE: 98 F | WEIGHT: 101 LBS

## 2022-10-11 DIAGNOSIS — Z00.00 MEDICARE ANNUAL WELLNESS VISIT, SUBSEQUENT: Primary | ICD-10-CM

## 2022-10-11 DIAGNOSIS — I25.10 CAD IN NATIVE ARTERY: Chronic | ICD-10-CM

## 2022-10-11 DIAGNOSIS — G47.00 INSOMNIA, UNSPECIFIED TYPE: ICD-10-CM

## 2022-10-11 DIAGNOSIS — E78.00 HYPERCHOLESTEROLEMIA: Chronic | ICD-10-CM

## 2022-10-11 DIAGNOSIS — R73.01 IMPAIRED FASTING GLUCOSE: ICD-10-CM

## 2022-10-11 DIAGNOSIS — I10 PRIMARY HYPERTENSION: Chronic | ICD-10-CM

## 2022-10-11 DIAGNOSIS — Z23 NEED FOR INFLUENZA VACCINATION: ICD-10-CM

## 2022-10-11 PROBLEM — I82.409 DEEP VENOUS THROMBOSIS (HCC): Status: ACTIVE | Noted: 2022-06-23

## 2022-10-11 PROBLEM — Z86.718 HISTORY OF DVT (DEEP VEIN THROMBOSIS): Status: ACTIVE | Noted: 2022-06-23

## 2022-10-11 PROCEDURE — G0008 ADMIN INFLUENZA VIRUS VAC: HCPCS | Performed by: NURSE PRACTITIONER

## 2022-10-11 PROCEDURE — 1170F FXNL STATUS ASSESSED: CPT | Performed by: NURSE PRACTITIONER

## 2022-10-11 PROCEDURE — 1159F MED LIST DOCD IN RCRD: CPT | Performed by: NURSE PRACTITIONER

## 2022-10-11 PROCEDURE — 90662 IIV NO PRSV INCREASED AG IM: CPT | Performed by: NURSE PRACTITIONER

## 2022-10-11 PROCEDURE — G0439 PPPS, SUBSEQ VISIT: HCPCS | Performed by: NURSE PRACTITIONER

## 2022-10-11 RX ORDER — ROSUVASTATIN CALCIUM 10 MG/1
10 TABLET, COATED ORAL DAILY
Qty: 90 TABLET | Refills: 2 | Status: SHIPPED | OUTPATIENT
Start: 2022-10-11

## 2022-10-11 RX ORDER — FUROSEMIDE 20 MG/1
20 TABLET ORAL DAILY
Qty: 90 TABLET | Refills: 1 | Status: SHIPPED | OUTPATIENT
Start: 2022-10-11

## 2022-10-11 NOTE — PROGRESS NOTES
The ABCs of the Annual Wellness Visit  Subsequent Medicare Wellness Visit    Chief Complaint   Patient presents with   • Medicare Wellness-subsequent      Subjective    History of Present Illness:  Marilynn Gil is a 87 y.o. female who presents for a Subsequent Medicare Wellness Visit.    The following portions of the patient's history were reviewed and   updated as appropriate: allergies, current medications, past family history, past medical history, past social history, past surgical history and problem list.    Compared to one year ago, the patient feels her physical   health is better.    Compared to one year ago, the patient feels her mental   health is the same.    Recent Hospitalizations:  She was not admitted to the hospital during the last year.       Current Medical Providers:  Patient Care Team:  Pam Charles APRN as PCP - General (Internal Medicine)  Joycelyn Zapata MD as PCP - Family Medicine    Outpatient Medications Prior to Visit   Medication Sig Dispense Refill   • Acetaminophen (TYLENOL PO) Take 500 mg by mouth Every 6 (Six) Hours As Needed (pain).     • aspirin 81 MG chewable tablet Chew 81 mg Daily.     • Diclofenac Sodium (VOLTAREN) 1 % gel gel Apply 4 g topically to the appropriate area as directed 4 (Four) Times a Day As Needed (pain). 150 g 1   • famotidine (PEPCID) 20 MG tablet Take 20 mg by mouth Daily As Needed for Indigestion.     • isosorbide mononitrate (IMDUR) 60 MG 24 hr tablet 1 tablet daily.  3   • lisinopril (PRINIVIL,ZESTRIL) 5 MG tablet Take 5 mg by mouth daily.     • NITROSTAT 0.4 MG SL tablet 1 tab sublingual every 5 minutes x 3 as needed for chest pressure  0   • pantoprazole (PROTONIX) 40 MG EC tablet Take 1 tablet by mouth Daily. 90 tablet 1   • potassium chloride (K-DUR,KLOR-CON) 10 MEQ CR tablet TAKE 1 TABLET BY MOUTH DAILY 90 tablet 1   • prasugrel (EFFIENT) tablet Take 5 mg by mouth daily.     • zolpidem (AMBIEN) 10 MG tablet TAKE 1 TABLET BY MOUTH AT NIGHT AS  "NEEDED FOR SLEEP 30 tablet 0   • furosemide (LASIX) 20 MG tablet TAKE 1 TABLET BY MOUTH DAILY 90 tablet 1   • methylPREDNISolone (MEDROL) 4 MG dose pack Take as directed on package instructions. 21 each 0   • metoprolol tartrate (LOPRESSOR) 25 MG tablet Take 1 tablet by mouth 2 (Two) Times a Day. 180 tablet 3   • rosuvastatin (CRESTOR) 10 MG tablet Take 10 mg by mouth Daily.       No facility-administered medications prior to visit.       No opioid medication identified on active medication list. I have reviewed chart for other potential  high risk medication/s and harmful drug interactions in the elderly.          Aspirin is on active medication list. Aspirin use is indicated based on review of current medical condition/s. Pros and cons of this therapy have been discussed today. Benefits of this medication outweigh potential harm.  Patient has been encouraged to continue taking this medication.  .      Patient Active Problem List   Diagnosis   • Chronic recurrent pancreatitis (HCC)   • Hypertension   • Hypercholesterolemia   • CAD in native artery   • CKD (chronic kidney disease) stage 4, GFR 15-29 ml/min ([facility])   • Insomnia   • Dysphagia   • Polyneuropathy   • Essential tremor   • Acute pain of left knee   • Chronic left shoulder pain   • Gastroesophageal reflux disease without esophagitis   • History of DVT (deep vein thrombosis)   • Impaired fasting glucose   • Medicare annual wellness visit, subsequent     Advance Care Planning  Advance Directive is on file.  ACP discussion was held with the patient during this visit. Patient has an advance directive in EMR which is still valid.           Objective    Vitals:    10/11/22 1007   BP: 110/80   BP Location: Right arm   Patient Position: Sitting   Cuff Size: Adult   Pulse: 74   Temp: 98 °F (36.7 °C)   TempSrc: Temporal   Weight: 45.8 kg (101 lb)   Height: 149.9 cm (59\")     Estimated body mass index is 20.4 kg/m² as calculated from the following:    Height as of this " "encounter: 149.9 cm (59\").    Weight as of this encounter: 45.8 kg (101 lb).    BMI is within normal parameters. No other follow-up for BMI required.      Does the patient have evidence of cognitive impairment? No    Physical Exam  Constitutional:       Appearance: Normal appearance.   HENT:      Head: Normocephalic and atraumatic.      Nose: Nose normal.      Mouth/Throat:      Mouth: Mucous membranes are dry.   Eyes:      Extraocular Movements: Extraocular movements intact.      Pupils: Pupils are equal, round, and reactive to light.      Comments: +glasses   Neck:      Vascular: No carotid bruit.   Cardiovascular:      Rate and Rhythm: Normal rate and regular rhythm.      Pulses: Normal pulses.      Heart sounds: Normal heart sounds. No murmur heard.    No friction rub. No gallop.   Pulmonary:      Effort: Pulmonary effort is normal. No respiratory distress.      Breath sounds: Normal breath sounds. No stridor. No wheezing, rhonchi or rales.   Musculoskeletal:         General: Tenderness present.      Cervical back: Normal range of motion and neck supple.   Skin:     General: Skin is warm and dry.   Neurological:      General: No focal deficit present.      Mental Status: She is alert and oriented to person, place, and time. Mental status is at baseline.   Psychiatric:         Mood and Affect: Mood normal.         Thought Content: Thought content normal.                 HEALTH RISK ASSESSMENT    Smoking Status:  Social History     Tobacco Use   Smoking Status Former   • Packs/day: 2.00   • Types: Cigarettes   • Quit date:    • Years since quittin.7   Smokeless Tobacco Never     Alcohol Consumption:  Social History     Substance and Sexual Activity   Alcohol Use No     Fall Risk Screen:    STEADI Fall Risk Assessment was completed, and patient is at HIGH risk for falls. Assessment completed on:10/11/2022    Depression Screening:  PHQ-2/PHQ-9 Depression Screening 10/11/2022   Retired Total Score -   Little " Interest or Pleasure in Doing Things 0-->not at all   Feeling Down, Depressed or Hopeless 0-->not at all   Trouble Falling or Staying Asleep, or Sleeping Too Much -   Feeling Tired or Having Little Energy -   Poor Appetite or Overeating -   Feeling Bad about Yourself - or that You are a Failure or Have Let Yourself or Your Family Down -   Trouble Concentrating on Things, Such as Reading the Newspaper or Watching Television -   Moving or Speaking So Slowly that Other People Could Have Noticed? Or the Opposite - Being So Fidgety -   Thoughts that You Would be Better Off Dead or of Hurting Yourself in Some Way -   PHQ-9: Brief Depression Severity Measure Score 0   If You Checked Off Any Problems, How Difficult Have These Problems Made It For You to Do Your Work, Take Care of Things at Home, or Get Along with Other People? -       Health Habits and Functional and Cognitive Screening:  Functional & Cognitive Status 10/11/2022   Do you have difficulty preparing food and eating? No   Do you have difficulty bathing yourself, getting dressed or grooming yourself? No   Do you have difficulty using the toilet? No   Do you have difficulty moving around from place to place? Yes   Do you have trouble with steps or getting out of a bed or a chair? Yes   Current Diet Well Balanced Diet   Dental Exam Up to date   Eye Exam Up to date   Exercise (times per week) 0 times per week   Current Exercises Include -   Current Exercise Activities Include -   Do you need help using the phone?  No   Are you deaf or do you have serious difficulty hearing?  No   Do you need help with transportation? Yes   Do you need help shopping? No   Do you need help preparing meals?  No   Do you need help with housework?  No   Do you need help with laundry? No   Do you need help taking your medications? No   Do you need help managing money? No   Do you ever drive or ride in a car without wearing a seat belt? No   Have you felt unusual stress, anger or  loneliness in the last month? No   Who do you live with? Alone   If you need help, do you have trouble finding someone available to you? No   Have you been bothered in the last four weeks by sexual problems? No   Do you have difficulty concentrating, remembering or making decisions? No       Age-appropriate Screening Schedule:  Refer to the list below for future screening recommendations based on patient's age, sex and/or medical conditions. Orders for these recommended tests are listed in the plan section. The patient has been provided with a written plan.    Health Maintenance   Topic Date Due   • ZOSTER VACCINE (1 of 2) Never done   • INFLUENZA VACCINE  08/01/2022   • LIPID PANEL  12/14/2022   • TDAP/TD VACCINES (3 - Td or Tdap) 11/01/2031   • MAMMOGRAM  Discontinued   • DXA SCAN  Discontinued              Assessment & Plan   CMS Preventative Services Quick Reference  Risk Factors Identified During Encounter  Cardiovascular Disease  Fall Risk-High or Moderate  Immunizations Discussed/Encouraged (specific Immunizations; Influenza, Shingrix and COVID19  The above risks/problems have been discussed with the patient.  Follow up actions/plans if indicated are seen below in the Assessment/Plan Section.  Pertinent information has been shared with the patient in the After Visit Summary.    Diagnoses and all orders for this visit:    1. Medicare annual wellness visit, subsequent (Primary)    2. Primary hypertension  Assessment & Plan:  Hypertension is improving with treatment.  Continue current treatment regimen.  Blood pressure will be reassessed at the next regular appointment.    Orders:  -     CBC & Differential  -     Comprehensive metabolic panel  -     metoprolol tartrate (LOPRESSOR) 25 MG tablet; Take 1 tablet by mouth 2 (Two) Times a Day.  Dispense: 180 tablet; Refill: 3  -     furosemide (LASIX) 20 MG tablet; Take 1 tablet by mouth Daily.  Dispense: 90 tablet; Refill: 1    3. CAD in native artery  Assessment &  Plan:  Continues on aspirin, effient, statin, and imdur.   Following with Dr. Valerio.       4. Hypercholesterolemia  Assessment & Plan:  Continues on statin.     Orders:  -     Lipid panel  -     rosuvastatin (CRESTOR) 10 MG tablet; Take 1 tablet by mouth Daily.  Dispense: 90 tablet; Refill: 2    5. Insomnia, unspecified type  Assessment & Plan:  Continues on ambien.       6. Impaired fasting glucose  -     Hemoglobin A1c      Follow Up:   Return in about 6 months (around 4/11/2023) for Recheck chronic conditions.     An After Visit Summary and PPPS were made available to the patient.

## 2022-10-12 DIAGNOSIS — E11.9 TYPE 2 DIABETES MELLITUS WITHOUT COMPLICATION, WITHOUT LONG-TERM CURRENT USE OF INSULIN: Primary | ICD-10-CM

## 2022-10-12 LAB
ALBUMIN SERPL-MCNC: 4.5 G/DL (ref 3.5–5.2)
ALBUMIN/GLOB SERPL: 1.8 G/DL
ALP SERPL-CCNC: 143 U/L (ref 39–117)
ALT SERPL-CCNC: 11 U/L (ref 1–33)
AST SERPL-CCNC: 23 U/L (ref 1–32)
BASOPHILS # BLD AUTO: 0.03 10*3/MM3 (ref 0–0.2)
BASOPHILS NFR BLD AUTO: 0.5 % (ref 0–1.5)
BILIRUB SERPL-MCNC: 0.2 MG/DL (ref 0–1.2)
BUN SERPL-MCNC: 31 MG/DL (ref 8–23)
BUN/CREAT SERPL: 15.6 (ref 7–25)
CALCIUM SERPL-MCNC: 9.2 MG/DL (ref 8.6–10.5)
CHLORIDE SERPL-SCNC: 103 MMOL/L (ref 98–107)
CHOLEST SERPL-MCNC: 210 MG/DL (ref 0–200)
CO2 SERPL-SCNC: 25 MMOL/L (ref 22–29)
CREAT SERPL-MCNC: 1.99 MG/DL (ref 0.57–1)
EGFRCR SERPLBLD CKD-EPI 2021: 24.2 ML/MIN/1.73
EOSINOPHIL # BLD AUTO: 0.1 10*3/MM3 (ref 0–0.4)
EOSINOPHIL NFR BLD AUTO: 1.6 % (ref 0.3–6.2)
ERYTHROCYTE [DISTWIDTH] IN BLOOD BY AUTOMATED COUNT: 14.2 % (ref 12.3–15.4)
GLOBULIN SER CALC-MCNC: 2.5 GM/DL
GLUCOSE SERPL-MCNC: 99 MG/DL (ref 65–99)
HBA1C MFR BLD: 6.8 % (ref 4.8–5.6)
HCT VFR BLD AUTO: 39.1 % (ref 34–46.6)
HDLC SERPL-MCNC: 67 MG/DL (ref 40–60)
HGB BLD-MCNC: 12.6 G/DL (ref 12–15.9)
IMM GRANULOCYTES # BLD AUTO: 0.02 10*3/MM3 (ref 0–0.05)
IMM GRANULOCYTES NFR BLD AUTO: 0.3 % (ref 0–0.5)
LDLC SERPL CALC-MCNC: 115 MG/DL (ref 0–100)
LYMPHOCYTES # BLD AUTO: 1.29 10*3/MM3 (ref 0.7–3.1)
LYMPHOCYTES NFR BLD AUTO: 21 % (ref 19.6–45.3)
MCH RBC QN AUTO: 27.5 PG (ref 26.6–33)
MCHC RBC AUTO-ENTMCNC: 32.2 G/DL (ref 31.5–35.7)
MCV RBC AUTO: 85.4 FL (ref 79–97)
MONOCYTES # BLD AUTO: 0.53 10*3/MM3 (ref 0.1–0.9)
MONOCYTES NFR BLD AUTO: 8.6 % (ref 5–12)
NEUTROPHILS # BLD AUTO: 4.17 10*3/MM3 (ref 1.7–7)
NEUTROPHILS NFR BLD AUTO: 68 % (ref 42.7–76)
NRBC BLD AUTO-RTO: 0 /100 WBC (ref 0–0.2)
PLATELET # BLD AUTO: 176 10*3/MM3 (ref 140–450)
POTASSIUM SERPL-SCNC: 5.2 MMOL/L (ref 3.5–5.2)
PROT SERPL-MCNC: 7 G/DL (ref 6–8.5)
RBC # BLD AUTO: 4.58 10*6/MM3 (ref 3.77–5.28)
SODIUM SERPL-SCNC: 141 MMOL/L (ref 136–145)
TRIGL SERPL-MCNC: 161 MG/DL (ref 0–150)
VLDLC SERPL CALC-MCNC: 28 MG/DL (ref 5–40)
WBC # BLD AUTO: 6.14 10*3/MM3 (ref 3.4–10.8)

## 2022-10-12 NOTE — PROGRESS NOTES
Please call the patient regarding her abnormal result:  CBC stable  Creatinine is stable at 1.99-- recommend f/u with Dr. Sood like we discussed during visit.   LDL mildly elevated-- recommend low sat fat/low refined carb diet.   A1C is up now-- at 6.8-- this is considered full diabetes type two. I'd like patient to start linagliptin daily to help bring this down. Patient needs to also keep close eye on glycemic intake. Patient also needs yearly diabetic eye exam and to make sure she is continuing to monitor feet for wounds.      F/u with me in 3 months please.

## 2022-11-01 DIAGNOSIS — G47.00 INSOMNIA: ICD-10-CM

## 2022-11-01 RX ORDER — ZOLPIDEM TARTRATE 10 MG/1
10 TABLET ORAL NIGHTLY PRN
Qty: 30 TABLET | Refills: 0 | Status: SHIPPED | OUTPATIENT
Start: 2022-11-01 | End: 2022-11-29

## 2022-11-07 DIAGNOSIS — E11.9 TYPE 2 DIABETES MELLITUS WITHOUT COMPLICATION, WITHOUT LONG-TERM CURRENT USE OF INSULIN: ICD-10-CM

## 2022-11-07 NOTE — TELEPHONE ENCOUNTER
Caller: Marilynn Gil    Relationship: Self    Best call back number: [phone]    Requested Prescriptions:   Requested Prescriptions     Pending Prescriptions Disp Refills   • linagliptin (TRADJENTA) 5 MG tablet tablet 90 tablet 0     Sig: Take 1 tablet by mouth Daily.        Pharmacy where request should be sent: [id number] [place] [id number] [facility] AT [facility] - [phone]  - [phone]       Does the patient have less than a 3 day supply:  [x] Yes  [] No    Neo Ramos Rep   11/07/22 10:51 EST

## 2022-11-09 NOTE — TELEPHONE ENCOUNTER
Caller: INESBEAU SOUZACALEB-  SON    Relationship: Emergency Contact    Best call back number: [phone]    Who are you requesting to speak with (clinical staff, provider,  specific staff member): CLINICAL STAFF     What was the call regarding: ASKING IF THERE IS A LESS EXPENSIVE ALTERNATIVE TO THE linagliptin (TRADJENTA) 5 MG tablet tablet  GOING TO COST HER OVER $200 A MONTH AT PHARMACY     Do you require a callback: YES

## 2022-11-10 NOTE — TELEPHONE ENCOUNTER
Ask her stop linagliptin.   I am going to refer her to endocrinology-- we may need to start insulin therapy due to her kidney function.   Stop linagliptin for now; follow low glycemic diet. Will refer to endo.

## 2022-11-14 PROBLEM — I10 ESSENTIAL HYPERTENSION, BENIGN: Status: ACTIVE | Noted: 2022-11-14

## 2022-11-14 PROBLEM — N18.4 CRI (CHRONIC RENAL INSUFFICIENCY), STAGE 4 (SEVERE): Status: ACTIVE | Noted: 2022-11-14

## 2022-11-14 PROBLEM — E11.29 TYPE II DIABETES MELLITUS WITH RENAL MANIFESTATIONS: Status: ACTIVE | Noted: 2022-01-01

## 2022-11-14 PROBLEM — E11.65 TYPE 2 DIABETES MELLITUS WITH HYPERGLYCEMIA: Status: ACTIVE | Noted: 2022-11-14

## 2022-11-14 PROBLEM — Z00.00 MEDICARE ANNUAL WELLNESS VISIT, SUBSEQUENT: Status: RESOLVED | Noted: 2022-01-01 | Resolved: 2022-01-01

## 2022-11-14 PROBLEM — E78.2 MIXED HYPERLIPIDEMIA: Status: ACTIVE | Noted: 2022-11-14

## 2022-11-14 PROBLEM — R73.01 IMPAIRED FASTING GLUCOSE: Status: RESOLVED | Noted: 2022-10-11 | Resolved: 2022-11-14

## 2022-11-14 PROBLEM — E11.59 TYPE 2 DIABETES MELLITUS WITH VASCULAR DISEASE: Status: ACTIVE | Noted: 2022-11-14

## 2022-11-14 NOTE — PROGRESS NOTES
Marilynn Gil, 87 y.o., Gender: female    Assessment/Plan    1.  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  A1c 6.8% 10/22, 6.2% 12/21.  Counseled that conservative goal A1c is to be <8 % and aggressive 7.5 % considering pt's age.  Counseled pt that overall risk with diabetes is related to long term complications of both small and large blood vessels and that the risk for CAD, MI, and stroke is much higher than general population.  Counseled on lifestyle change as a key part of this process to improve all parameters related to diabetes.  To that end as pt states was just told had DM T2 and previously was just impaired glucose, feel diet education is only intervention needed and pt should already have been on a renal diet but states that is not the case.  Pt is not checking enough as directed based on current treatment plan to check 0-2 times a day.  Pt does not have meter and at this time one is not indicated.  No actual tx is indicated at this time and as noted unless A1c is >8% for this pt would not warrant any directed tx.  That being said, Tradjenta 5 mg is only oral tx without concern for renal status as long as pt is not on dialysis as that then needs to be stopped.  Renal status seems to be beyond even low dose of Farxiga but would be up to nephrology, which pt has a nephrologist, about use of Farxiga as would be more for renal then DM tx.  Otherwise insulin would be next treatment option but is not needed at this time and unless the A1c is around 10% would not be needed.  I ordered for basic diet education as primary focus at this time.  Pt directed back to PCP for them to continue to monitor situation.   2.  Hypertension goal of <140/90.  Pt on tx for renal protection.  Last microalbumin/creatinine ratio urine check not known so will get with next labs.  Note pt has CRI stage 4 and has been at this level for some time it seems.  Pt already has nephrologist.   3.  Hyperlipidemia LDL  goal <50 and trig goal <150 as very high risk w/ vascular dz and CRI.  Rec cont current tx w/ consideration to inc the dose to get the LDL down to goal.        4.  Note visit started around 755 as pt was ready early.      Time Counseled: 15  Minutes  Total Time: 25  Minutes    Return to office in:   back to PCP      Random Glucose: 117 mg/dL      HPI Summary    Pt is here for evaluation of DM reported to be Type 2.  Pt's son with her today but he resides in [place] and states usually a niece is involved w/ care of pt.  Pt reports DM just recently previous was just impaired glucose metabolism for unknown time frame.  Pt reports blood glucoses are unknown without record as does not have meter as yet.  Pt reports weight has been stable.  Pt has report of fatigue.  Pt has complaint of general muscle weakness.  Pt denies any increased thirst or urination.  Pt reports occ chest pain follows w/ cardiology on tx.  Pt denies any shortness of breath.  Pt denies any abdominal complaints at this time but has occ diarrhea related to food choice.  Pt denies any blurry vision.  Pt reports last seen by eye doctor not for DM so unknown retinopathy status.  Pt reports no problems with feet but has n/t in hands and upper ext after having neck fx in the past.  Pt denies any skin wounds.  Pt has no report of depression.  Pt reports good sleep quality without apnea symptoms.  Pt reports not trying to follow a diabetic type diet or renal diet and little to no exercise.  Pt reports having pneumonia vaccine in the past and flu shot this last season.  Last education class was not done as yet.  Pt without any other complaints related to diabetes at this time.    Review of Systems  see HPI      Patient History    Past History (reviewed):    Medical:   Past Medical History:   Diagnosis Date   • Anxiety    • CAD (coronary artery disease) 1985    estimated time frame   • CRI (chronic renal insufficiency), stage 4 (severe) (HCC)    • CVA (cerebral  vascular accident) (HCC)    • Essential hypertension, benign    • Essential tremor    • GERD (gastroesophageal reflux disease)    • History of DVT (deep vein thrombosis)    • Impaired glucose metabolism     unclear how long this has been present, but only as of '22 did pt meet dx criteria for DM T2   • Insomnia    • Mesenteric ischemia, chronic (HCC)    • Mixed hyperlipidemia    • Neuropathy     both upper ext mainly hands after neck fx   • Osteoporosis    • Pancreatitis     hx of   • Type 2 diabetes mellitus with hyperglycemia (HCC) 2022   • 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 (HCC) 1985    CAD predates DM by decades   • Type II diabetes mellitus with renal manifestations (HCC) 2010    estimated time frame, precedes DM by many years   • Venous thrombosis and embolism        Surgery:   Past Surgical History:   Procedure Laterality Date   • BACK SURGERY      x 4   • BREAST MASS EXCISION      Benign   • CATARACT EXTRACTION Bilateral    • CHOLECYSTECTOMY  08/2013   • COLONOSCOPY  12/2012    scarred mucosa,internal hemorrhoids   • COLONOSCOPY  09/12/2012    Patent side to side ileo colonic anastomosis, one 7mm polyp in the ascending colon, erythematous and vascular pattern decreased mucosa in the transverse colon, scarred mucosa in the rectum, NBIH.  PATH: Tubular adenoma, Patchy mild chronic inflammation    • CORONARY ANGIOPLASTY      MI 1985, s/p angioplasty 1987   • ENDOSCOPY  12/06/2013    z line irreg 38cm from the incisors, tortuous esophagus, HH, gastritis, bilious gastric fluid, duodenitis.  PATH: Gastric mucosa with minimal chornic infalmmation and with histologic features suggestive of reactive gastropathy.    • PANCREAS SURGERY  05/2013    Pancreatic duct stricture, stent placed   • RECTAL PROLAPSE REPAIR  2007    Rectopexy and sigmoidectomy, s/p rectal prolapse repair x 2 prior to 2007   • SPHINCTEROTOMY  05/2013         Social History (reviewed):  Smoking 1 ppd 30-35 yrs quit early '90s, - ETOH,  - Drugs,  a niece is involved with caring for her, Occupation retired clerical and retail sales    Medication List:  Current medications were reviewed.    Allergies:    Allergies   Allergen Reactions   • Codeine Nausea And Vomiting   • Gabapentin Other (See Comments)     Did not tolerate   • Lyrica [Pregabalin] Other (See Comments)     Did not tolerate   • Other Nausea And Vomiting     Darvocet   • Shellfish-Derived Products Other (See Comments)     Cant remember   • Sulfa Antibiotics Other (See Comments)     Can't remember       Physical Exam    VITALS:    Vitals:    11/18/22 0750   BP: 122/80   Pulse: 65   Resp: 16   SpO2: 92%     Body mass index is 20.28 kg/m².    GENERAL:  Looks stated age, Thin small stature  HEAD/EYES:  N/C, A/T, Mask in place  EXTREMITIES:  FROM  CNS:  A&Ox3    Full exam not done today.      Labs/Imaging  All available lab and imaging data were reviewed.        Roosevelt Jasso MD, SHANIA, FACE, [facility]  11/18/2022  08:25 EST

## 2022-11-18 ENCOUNTER — OFFICE VISIT (OUTPATIENT)
Dept: ENDOCRINOLOGY | Age: 87
End: 2022-11-18

## 2022-11-18 VITALS
HEIGHT: 59 IN | HEART RATE: 65 BPM | RESPIRATION RATE: 16 BRPM | WEIGHT: 100.4 LBS | OXYGEN SATURATION: 92 % | DIASTOLIC BLOOD PRESSURE: 80 MMHG | BODY MASS INDEX: 20.24 KG/M2 | SYSTOLIC BLOOD PRESSURE: 122 MMHG

## 2022-11-18 DIAGNOSIS — I10 ESSENTIAL HYPERTENSION, BENIGN: ICD-10-CM

## 2022-11-18 DIAGNOSIS — E11.29 TYPE 2 DIABETES MELLITUS WITH OTHER KIDNEY COMPLICATION, UNSPECIFIED WHETHER LONG TERM INSULIN USE: ICD-10-CM

## 2022-11-18 DIAGNOSIS — E78.2 MIXED HYPERLIPIDEMIA: ICD-10-CM

## 2022-11-18 DIAGNOSIS — N18.4 CRI (CHRONIC RENAL INSUFFICIENCY), STAGE 4 (SEVERE): ICD-10-CM

## 2022-11-18 DIAGNOSIS — E11.65 TYPE 2 DIABETES MELLITUS WITH HYPERGLYCEMIA, UNSPECIFIED WHETHER LONG TERM INSULIN USE: Primary | ICD-10-CM

## 2022-11-18 DIAGNOSIS — E11.59 TYPE 2 DIABETES MELLITUS WITH VASCULAR DISEASE: ICD-10-CM

## 2022-11-18 PROBLEM — Z86.718 PERSONAL HISTORY OF OTHER VENOUS THROMBOSIS AND EMBOLISM: Status: ACTIVE | Noted: 2022-06-23

## 2022-11-18 LAB — GLUCOSE BLDC GLUCOMTR-MCNC: 117 MG/DL (ref 70–130)

## 2022-11-18 PROCEDURE — 99204 OFFICE O/P NEW MOD 45 MIN: CPT | Performed by: INTERNAL MEDICINE

## 2022-11-18 PROCEDURE — 82962 GLUCOSE BLOOD TEST: CPT | Performed by: INTERNAL MEDICINE

## 2022-11-18 PROCEDURE — 36416 COLLJ CAPILLARY BLOOD SPEC: CPT | Performed by: INTERNAL MEDICINE

## 2022-11-18 NOTE — PATIENT INSTRUCTIONS
As discussed see no need for medication at this time focus is on diet.  If the A1c is >8% then medication would be warranted and Tradjenta is the only oral option for current renal status, otherwise would be insulin.  Continue to follow with your PCP for them to monitor things.

## 2022-11-29 DIAGNOSIS — G47.00 INSOMNIA: ICD-10-CM

## 2022-11-29 RX ORDER — ZOLPIDEM TARTRATE 10 MG/1
10 TABLET ORAL NIGHTLY PRN
Qty: 30 TABLET | Refills: 0 | Status: SHIPPED | OUTPATIENT
Start: 2022-11-29 | End: 2022-12-28

## 2022-12-27 DIAGNOSIS — G47.00 INSOMNIA: ICD-10-CM

## 2022-12-28 RX ORDER — ZOLPIDEM TARTRATE 10 MG/1
10 TABLET ORAL NIGHTLY PRN
Qty: 30 TABLET | Refills: 0 | Status: SHIPPED | OUTPATIENT
Start: 2022-12-28 | End: 2023-01-25

## 2022-12-28 NOTE — TELEPHONE ENCOUNTER
Rx Refill Note  Requested Prescriptions     Pending Prescriptions Disp Refills   • zolpidem (AMBIEN) 10 MG tablet [Pharmacy Med Name: ZOLPIDEM 10MG TABLETS] 30 tablet      Sig: TAKE 1 TABLET BY MOUTH AT NIGHT AS NEEDED FOR SLEEP      Last office visit with prescribing clinician: 10/11/2022    Next office visit with prescribing clinician: 4/12/2023

## 2023-01-01 ENCOUNTER — APPOINTMENT (OUTPATIENT)
Dept: GENERAL RADIOLOGY | Facility: HOSPITAL | Age: 88
DRG: 291 | End: 2023-01-01
Payer: MEDICARE

## 2023-01-01 ENCOUNTER — APPOINTMENT (OUTPATIENT)
Dept: CARDIOLOGY | Facility: HOSPITAL | Age: 88
DRG: 291 | End: 2023-01-01
Payer: MEDICARE

## 2023-01-01 ENCOUNTER — APPOINTMENT (OUTPATIENT)
Dept: OTHER | Facility: HOSPITAL | Age: 88
DRG: 291 | End: 2023-01-01
Payer: MEDICARE

## 2023-01-01 ENCOUNTER — HOSPITAL ENCOUNTER (INPATIENT)
Facility: HOSPITAL | Age: 88
LOS: 8 days | DRG: 291 | End: 2023-07-19
Attending: EMERGENCY MEDICINE | Admitting: INTERNAL MEDICINE
Payer: MEDICARE

## 2023-01-01 ENCOUNTER — APPOINTMENT (OUTPATIENT)
Dept: ULTRASOUND IMAGING | Facility: HOSPITAL | Age: 88
DRG: 291 | End: 2023-01-01
Payer: MEDICARE

## 2023-01-01 ENCOUNTER — APPOINTMENT (OUTPATIENT)
Dept: CT IMAGING | Facility: HOSPITAL | Age: 88
DRG: 291 | End: 2023-01-01
Payer: MEDICARE

## 2023-01-01 VITALS
DIASTOLIC BLOOD PRESSURE: 63 MMHG | HEIGHT: 58 IN | TEMPERATURE: 98.1 F | SYSTOLIC BLOOD PRESSURE: 109 MMHG | BODY MASS INDEX: 21.61 KG/M2 | WEIGHT: 102.95 LBS

## 2023-01-01 DIAGNOSIS — R09.02 HYPOXIA: ICD-10-CM

## 2023-01-01 DIAGNOSIS — I50.9 ACUTE CONGESTIVE HEART FAILURE, UNSPECIFIED HEART FAILURE TYPE: Primary | ICD-10-CM

## 2023-01-01 DIAGNOSIS — Z09 FOLLOW-UP EXAM: ICD-10-CM

## 2023-01-01 LAB
ALBUMIN SERPL-MCNC: 3.3 G/DL (ref 3.5–5.2)
ALBUMIN SERPL-MCNC: 3.4 G/DL (ref 3.5–5.2)
ALBUMIN SERPL-MCNC: 3.5 G/DL (ref 3.5–5.2)
ALBUMIN SERPL-MCNC: 3.6 G/DL (ref 3.5–5.2)
ALBUMIN SERPL-MCNC: 3.8 G/DL (ref 3.5–5.2)
ALBUMIN SERPL-MCNC: 3.9 G/DL (ref 3.5–5.2)
ALBUMIN SERPL-MCNC: 4.2 G/DL (ref 3.5–5.2)
ALBUMIN/GLOB SERPL: 1.2 G/DL
ALBUMIN/GLOB SERPL: 1.3 G/DL
ALBUMIN/GLOB SERPL: 1.4 G/DL
ALBUMIN/GLOB SERPL: 1.4 G/DL
ALP SERPL-CCNC: 113 U/L (ref 39–117)
ALP SERPL-CCNC: 125 U/L (ref 39–117)
ALP SERPL-CCNC: 131 U/L (ref 39–117)
ALP SERPL-CCNC: 135 U/L (ref 39–117)
ALP SERPL-CCNC: 136 U/L (ref 39–117)
ALT SERPL W P-5'-P-CCNC: 10 U/L (ref 1–33)
ALT SERPL W P-5'-P-CCNC: 11 U/L (ref 1–33)
ALT SERPL W P-5'-P-CCNC: 12 U/L (ref 1–33)
ALT SERPL W P-5'-P-CCNC: 13 U/L (ref 1–33)
ALT SERPL W P-5'-P-CCNC: 13 U/L (ref 1–33)
ANION GAP SERPL CALCULATED.3IONS-SCNC: 10 MMOL/L (ref 5–15)
ANION GAP SERPL CALCULATED.3IONS-SCNC: 10.3 MMOL/L (ref 5–15)
ANION GAP SERPL CALCULATED.3IONS-SCNC: 10.5 MMOL/L (ref 5–15)
ANION GAP SERPL CALCULATED.3IONS-SCNC: 11 MMOL/L (ref 5–15)
ANION GAP SERPL CALCULATED.3IONS-SCNC: 12 MMOL/L (ref 5–15)
ANION GAP SERPL CALCULATED.3IONS-SCNC: 12.9 MMOL/L (ref 5–15)
ANION GAP SERPL CALCULATED.3IONS-SCNC: 14.5 MMOL/L (ref 5–15)
ANION GAP SERPL CALCULATED.3IONS-SCNC: 6.4 MMOL/L (ref 5–15)
AST SERPL-CCNC: 17 U/L (ref 1–32)
AST SERPL-CCNC: 24 U/L (ref 1–32)
AST SERPL-CCNC: 24 U/L (ref 1–32)
AST SERPL-CCNC: 25 U/L (ref 1–32)
AST SERPL-CCNC: 28 U/L (ref 1–32)
B PARAPERT DNA SPEC QL NAA+PROBE: NOT DETECTED
B PERT DNA SPEC QL NAA+PROBE: NOT DETECTED
BASOPHILS # BLD AUTO: 0.03 10*3/MM3 (ref 0–0.2)
BASOPHILS # BLD AUTO: 0.04 10*3/MM3 (ref 0–0.2)
BASOPHILS # BLD AUTO: 0.05 10*3/MM3 (ref 0–0.2)
BASOPHILS NFR BLD AUTO: 0.3 % (ref 0–1.5)
BASOPHILS NFR BLD AUTO: 0.4 % (ref 0–1.5)
BASOPHILS NFR BLD AUTO: 0.4 % (ref 0–1.5)
BASOPHILS NFR BLD AUTO: 0.5 % (ref 0–1.5)
BASOPHILS NFR BLD AUTO: 0.6 % (ref 0–1.5)
BH CV ECHO MEAS - ACS: 1.95 CM
BH CV ECHO MEAS - AI P1/2T: 661.3 MSEC
BH CV ECHO MEAS - AO MAX PG: 9.2 MMHG
BH CV ECHO MEAS - AO MEAN PG: 4.1 MMHG
BH CV ECHO MEAS - AO ROOT DIAM: 3.3 CM
BH CV ECHO MEAS - AO V2 MAX: 152 CM/SEC
BH CV ECHO MEAS - AO V2 VTI: 31.6 CM
BH CV ECHO MEAS - AVA(I,D): 2.07 CM2
BH CV ECHO MEAS - EDV(MOD-SP2): 107 ML
BH CV ECHO MEAS - EDV(MOD-SP4): 113 ML
BH CV ECHO MEAS - EF(MOD-BP): 38.7 %
BH CV ECHO MEAS - EF(MOD-SP2): 38.3 %
BH CV ECHO MEAS - EF(MOD-SP4): 39.8 %
BH CV ECHO MEAS - ESV(MOD-SP2): 66 ML
BH CV ECHO MEAS - ESV(MOD-SP4): 68 ML
BH CV ECHO MEAS - LAT PEAK E' VEL: 14 CM/SEC
BH CV ECHO MEAS - LV MAX PG: 4.8 MMHG
BH CV ECHO MEAS - LV MEAN PG: 2.37 MMHG
BH CV ECHO MEAS - LV V1 MAX: 109.6 CM/SEC
BH CV ECHO MEAS - LV V1 VTI: 21.6 CM
BH CV ECHO MEAS - LVOT AREA: 3 CM2
BH CV ECHO MEAS - LVOT DIAM: 1.96 CM
BH CV ECHO MEAS - MED PEAK E' VEL: 4.5 CM/SEC
BH CV ECHO MEAS - MV A DUR: 0.1 SEC
BH CV ECHO MEAS - MV A MAX VEL: 110.8 CM/SEC
BH CV ECHO MEAS - MV DEC SLOPE: 271.1 CM/SEC2
BH CV ECHO MEAS - MV DEC TIME: 0.17 MSEC
BH CV ECHO MEAS - MV E MAX VEL: 53.9 CM/SEC
BH CV ECHO MEAS - MV E/A: 0.49
BH CV ECHO MEAS - MV MAX PG: 4.9 MMHG
BH CV ECHO MEAS - MV MEAN PG: 1.8 MMHG
BH CV ECHO MEAS - MV P1/2T: 81.7 MSEC
BH CV ECHO MEAS - MV V2 VTI: 24.4 CM
BH CV ECHO MEAS - MVA(P1/2T): 2.7 CM2
BH CV ECHO MEAS - MVA(VTI): 2.7 CM2
BH CV ECHO MEAS - PA ACC TIME: 0.06 SEC
BH CV ECHO MEAS - PA V2 MAX: 53.3 CM/SEC
BH CV ECHO MEAS - PULM A REVS DUR: 0.11 SEC
BH CV ECHO MEAS - PULM A REVS VEL: 27.3 CM/SEC
BH CV ECHO MEAS - PULM DIAS VEL: 31.9 CM/SEC
BH CV ECHO MEAS - PULM S/D: 1.53
BH CV ECHO MEAS - PULM SYS VEL: 48.8 CM/SEC
BH CV ECHO MEAS - RAP SYSTOLE: 3 MMHG
BH CV ECHO MEAS - RV MAX PG: 0.73 MMHG
BH CV ECHO MEAS - RV V1 MAX: 42.7 CM/SEC
BH CV ECHO MEAS - RV V1 VTI: 9.9 CM
BH CV ECHO MEAS - RVSP: 3 MMHG
BH CV ECHO MEAS - SV(LVOT): 65.5 ML
BH CV ECHO MEAS - SV(MOD-SP2): 41 ML
BH CV ECHO MEAS - SV(MOD-SP4): 45 ML
BH CV ECHO MEAS - TAPSE (>1.6): 1.27 CM
BH CV ECHO MEAS - TR MAX PG: 34.1 MMHG
BH CV ECHO MEAS - TR MAX VEL: 292 CM/SEC
BH CV ECHO MEASUREMENTS AVERAGE E/E' RATIO: 5.83
BH CV XLRA - RV BASE: 2.9 CM
BH CV XLRA - RV LENGTH: 5.5 CM
BH CV XLRA - RV MID: 1.77 CM
BH CV XLRA - TDI S': 14.7 CM/SEC
BILIRUB CONJ SERPL-MCNC: <0.2 MG/DL (ref 0–0.3)
BILIRUB INDIRECT SERPL-MCNC: ABNORMAL MG/DL
BILIRUB SERPL-MCNC: 0.3 MG/DL (ref 0–1.2)
BILIRUB UR QL STRIP: NEGATIVE
BUN SERPL-MCNC: 28 MG/DL (ref 8–23)
BUN SERPL-MCNC: 31 MG/DL (ref 8–23)
BUN SERPL-MCNC: 37 MG/DL (ref 8–23)
BUN SERPL-MCNC: 39 MG/DL (ref 8–23)
BUN SERPL-MCNC: 45 MG/DL (ref 8–23)
BUN SERPL-MCNC: 46 MG/DL (ref 8–23)
BUN SERPL-MCNC: 47 MG/DL (ref 8–23)
BUN SERPL-MCNC: 49 MG/DL (ref 8–23)
BUN/CREAT SERPL: 14.6 (ref 7–25)
BUN/CREAT SERPL: 14.7 (ref 7–25)
BUN/CREAT SERPL: 15.1 (ref 7–25)
BUN/CREAT SERPL: 16.1 (ref 7–25)
BUN/CREAT SERPL: 16.8 (ref 7–25)
BUN/CREAT SERPL: 17 (ref 7–25)
BUN/CREAT SERPL: 17 (ref 7–25)
BUN/CREAT SERPL: 17.6 (ref 7–25)
C PNEUM DNA NPH QL NAA+NON-PROBE: NOT DETECTED
CALCIUM SPEC-SCNC: 8.3 MG/DL (ref 8.6–10.5)
CALCIUM SPEC-SCNC: 8.3 MG/DL (ref 8.6–10.5)
CALCIUM SPEC-SCNC: 8.4 MG/DL (ref 8.6–10.5)
CALCIUM SPEC-SCNC: 8.6 MG/DL (ref 8.6–10.5)
CALCIUM SPEC-SCNC: 8.8 MG/DL (ref 8.6–10.5)
CALCIUM SPEC-SCNC: 8.9 MG/DL (ref 8.6–10.5)
CALCIUM SPEC-SCNC: 9.2 MG/DL (ref 8.6–10.5)
CALCIUM SPEC-SCNC: 9.2 MG/DL (ref 8.6–10.5)
CHLORIDE SERPL-SCNC: 100 MMOL/L (ref 98–107)
CHLORIDE SERPL-SCNC: 100 MMOL/L (ref 98–107)
CHLORIDE SERPL-SCNC: 102 MMOL/L (ref 98–107)
CHLORIDE SERPL-SCNC: 103 MMOL/L (ref 98–107)
CHLORIDE SERPL-SCNC: 103 MMOL/L (ref 98–107)
CHLORIDE SERPL-SCNC: 97 MMOL/L (ref 98–107)
CHLORIDE SERPL-SCNC: 98 MMOL/L (ref 98–107)
CHLORIDE SERPL-SCNC: 99 MMOL/L (ref 98–107)
CHLORIDE UR-SCNC: 38 MMOL/L
CLARITY UR: CLEAR
CLUMPED PLATELETS: NORMAL
CO2 SERPL-SCNC: 25.5 MMOL/L (ref 22–29)
CO2 SERPL-SCNC: 27 MMOL/L (ref 22–29)
CO2 SERPL-SCNC: 27 MMOL/L (ref 22–29)
CO2 SERPL-SCNC: 28.7 MMOL/L (ref 22–29)
CO2 SERPL-SCNC: 29.1 MMOL/L (ref 22–29)
CO2 SERPL-SCNC: 30 MMOL/L (ref 22–29)
CO2 SERPL-SCNC: 30.6 MMOL/L (ref 22–29)
CO2 SERPL-SCNC: 31.5 MMOL/L (ref 22–29)
COLOR UR: YELLOW
CREAT SERPL-MCNC: 1.76 MG/DL (ref 0.57–1)
CREAT SERPL-MCNC: 1.86 MG/DL (ref 0.57–1)
CREAT SERPL-MCNC: 2.18 MG/DL (ref 0.57–1)
CREAT SERPL-MCNC: 2.32 MG/DL (ref 0.57–1)
CREAT SERPL-MCNC: 2.65 MG/DL (ref 0.57–1)
CREAT SERPL-MCNC: 2.86 MG/DL (ref 0.57–1)
CREAT SERPL-MCNC: 3.19 MG/DL (ref 0.57–1)
CREAT SERPL-MCNC: 3.35 MG/DL (ref 0.57–1)
CREAT UR-MCNC: 78.5 MG/DL
D-LACTATE SERPL-SCNC: 1.1 MMOL/L (ref 0.5–2)
DEPRECATED RDW RBC AUTO: 44.4 FL (ref 37–54)
DEPRECATED RDW RBC AUTO: 44.9 FL (ref 37–54)
DEPRECATED RDW RBC AUTO: 45.3 FL (ref 37–54)
DEPRECATED RDW RBC AUTO: 46.4 FL (ref 37–54)
DEPRECATED RDW RBC AUTO: 46.5 FL (ref 37–54)
DEPRECATED RDW RBC AUTO: 46.5 FL (ref 37–54)
DEPRECATED RDW RBC AUTO: 46.9 FL (ref 37–54)
EGFRCR SERPLBLD CKD-EPI 2021: 12.9 ML/MIN/1.73
EGFRCR SERPLBLD CKD-EPI 2021: 13.7 ML/MIN/1.73
EGFRCR SERPLBLD CKD-EPI 2021: 15.6 ML/MIN/1.73
EGFRCR SERPLBLD CKD-EPI 2021: 17.1 ML/MIN/1.73
EGFRCR SERPLBLD CKD-EPI 2021: 20 ML/MIN/1.73
EGFRCR SERPLBLD CKD-EPI 2021: 21.6 ML/MIN/1.73
EGFRCR SERPLBLD CKD-EPI 2021: 26.1 ML/MIN/1.73
EGFRCR SERPLBLD CKD-EPI 2021: 27.9 ML/MIN/1.73
EOSINOPHIL # BLD AUTO: 0.13 10*3/MM3 (ref 0–0.4)
EOSINOPHIL # BLD AUTO: 0.22 10*3/MM3 (ref 0–0.4)
EOSINOPHIL # BLD AUTO: 0.27 10*3/MM3 (ref 0–0.4)
EOSINOPHIL # BLD AUTO: 0.38 10*3/MM3 (ref 0–0.4)
EOSINOPHIL # BLD AUTO: 0.42 10*3/MM3 (ref 0–0.4)
EOSINOPHIL NFR BLD AUTO: 1.5 % (ref 0.3–6.2)
EOSINOPHIL NFR BLD AUTO: 3.1 % (ref 0.3–6.2)
EOSINOPHIL NFR BLD AUTO: 3.5 % (ref 0.3–6.2)
EOSINOPHIL NFR BLD AUTO: 4.6 % (ref 0.3–6.2)
EOSINOPHIL NFR BLD AUTO: 4.7 % (ref 0.3–6.2)
ERYTHROCYTE [DISTWIDTH] IN BLOOD BY AUTOMATED COUNT: 15.4 % (ref 12.3–15.4)
ERYTHROCYTE [DISTWIDTH] IN BLOOD BY AUTOMATED COUNT: 15.5 % (ref 12.3–15.4)
ERYTHROCYTE [DISTWIDTH] IN BLOOD BY AUTOMATED COUNT: 15.7 % (ref 12.3–15.4)
ERYTHROCYTE [DISTWIDTH] IN BLOOD BY AUTOMATED COUNT: 15.8 % (ref 12.3–15.4)
ERYTHROCYTE [DISTWIDTH] IN BLOOD BY AUTOMATED COUNT: 15.8 % (ref 12.3–15.4)
FERRITIN SERPL-MCNC: 28.4 NG/ML (ref 13–150)
FLUAV SUBTYP SPEC NAA+PROBE: NOT DETECTED
FLUBV RNA ISLT QL NAA+PROBE: NOT DETECTED
GEN 5 2HR TROPONIN T REFLEX: 43 NG/L
GGT SERPL-CCNC: 24 U/L (ref 5–36)
GLOBULIN UR ELPH-MCNC: 2.7 GM/DL
GLOBULIN UR ELPH-MCNC: 2.8 GM/DL
GLOBULIN UR ELPH-MCNC: 2.8 GM/DL
GLOBULIN UR ELPH-MCNC: 3 GM/DL
GLUCOSE BLDC GLUCOMTR-MCNC: 106 MG/DL (ref 70–130)
GLUCOSE BLDC GLUCOMTR-MCNC: 111 MG/DL (ref 70–130)
GLUCOSE BLDC GLUCOMTR-MCNC: 111 MG/DL (ref 70–130)
GLUCOSE BLDC GLUCOMTR-MCNC: 115 MG/DL (ref 70–130)
GLUCOSE BLDC GLUCOMTR-MCNC: 121 MG/DL (ref 70–130)
GLUCOSE BLDC GLUCOMTR-MCNC: 123 MG/DL (ref 70–130)
GLUCOSE BLDC GLUCOMTR-MCNC: 129 MG/DL (ref 70–130)
GLUCOSE BLDC GLUCOMTR-MCNC: 144 MG/DL (ref 70–130)
GLUCOSE BLDC GLUCOMTR-MCNC: 150 MG/DL (ref 70–130)
GLUCOSE BLDC GLUCOMTR-MCNC: 92 MG/DL (ref 70–130)
GLUCOSE BLDC GLUCOMTR-MCNC: 94 MG/DL (ref 70–130)
GLUCOSE BLDC GLUCOMTR-MCNC: 95 MG/DL (ref 70–130)
GLUCOSE SERPL-MCNC: 101 MG/DL (ref 65–99)
GLUCOSE SERPL-MCNC: 107 MG/DL (ref 65–99)
GLUCOSE SERPL-MCNC: 108 MG/DL (ref 65–99)
GLUCOSE SERPL-MCNC: 108 MG/DL (ref 65–99)
GLUCOSE SERPL-MCNC: 114 MG/DL (ref 65–99)
GLUCOSE SERPL-MCNC: 114 MG/DL (ref 65–99)
GLUCOSE SERPL-MCNC: 78 MG/DL (ref 65–99)
GLUCOSE SERPL-MCNC: 99 MG/DL (ref 65–99)
GLUCOSE UR STRIP-MCNC: NEGATIVE MG/DL
HADV DNA SPEC NAA+PROBE: NOT DETECTED
HCOV 229E RNA SPEC QL NAA+PROBE: NOT DETECTED
HCOV HKU1 RNA SPEC QL NAA+PROBE: NOT DETECTED
HCOV NL63 RNA SPEC QL NAA+PROBE: NOT DETECTED
HCOV OC43 RNA SPEC QL NAA+PROBE: NOT DETECTED
HCT VFR BLD AUTO: 32.3 % (ref 34–46.6)
HCT VFR BLD AUTO: 33.4 % (ref 34–46.6)
HCT VFR BLD AUTO: 33.6 % (ref 34–46.6)
HCT VFR BLD AUTO: 33.7 % (ref 34–46.6)
HCT VFR BLD AUTO: 34.2 % (ref 34–46.6)
HCT VFR BLD AUTO: 34.3 % (ref 34–46.6)
HCT VFR BLD AUTO: 35.2 % (ref 34–46.6)
HGB BLD-MCNC: 10.3 G/DL (ref 12–15.9)
HGB BLD-MCNC: 10.4 G/DL (ref 12–15.9)
HGB BLD-MCNC: 10.5 G/DL (ref 12–15.9)
HGB BLD-MCNC: 10.6 G/DL (ref 12–15.9)
HGB BLD-MCNC: 9.9 G/DL (ref 12–15.9)
HGB UR QL STRIP.AUTO: NEGATIVE
HMPV RNA NPH QL NAA+NON-PROBE: NOT DETECTED
HPIV1 RNA ISLT QL NAA+PROBE: NOT DETECTED
HPIV2 RNA SPEC QL NAA+PROBE: NOT DETECTED
HPIV3 RNA NPH QL NAA+PROBE: NOT DETECTED
HPIV4 P GENE NPH QL NAA+PROBE: NOT DETECTED
IGA SERPL-MCNC: 278 MG/DL (ref 64–422)
IGG SERPL-MCNC: 990 MG/DL (ref 586–1602)
IGM SERPL-MCNC: 86 MG/DL (ref 26–217)
IMM GRANULOCYTES # BLD AUTO: 0.01 10*3/MM3 (ref 0–0.05)
IMM GRANULOCYTES # BLD AUTO: 0.02 10*3/MM3 (ref 0–0.05)
IMM GRANULOCYTES NFR BLD AUTO: 0.1 % (ref 0–0.5)
IMM GRANULOCYTES NFR BLD AUTO: 0.2 % (ref 0–0.5)
INR PPP: 0.93 (ref 0.9–1.1)
IRON 24H UR-MRATE: 47 MCG/DL (ref 37–145)
IRON SATN MFR SERPL: 13 % (ref 20–50)
KAPPA LC FREE SER-MCNC: 93.3 MG/L (ref 3.3–19.4)
KAPPA LC FREE/LAMBDA FREE SER: 1.23 {RATIO} (ref 0.26–1.65)
KETONES UR QL STRIP: NEGATIVE
LAMBDA LC FREE SERPL-MCNC: 76.1 MG/L (ref 5.7–26.3)
LEFT ATRIUM VOLUME INDEX: 34.2 ML/M2
LEUKOCYTE ESTERASE UR QL STRIP.AUTO: NEGATIVE
LIPASE SERPL-CCNC: 26 U/L (ref 13–60)
LYMPHOCYTES # BLD AUTO: 1.3 10*3/MM3 (ref 0.7–3.1)
LYMPHOCYTES # BLD AUTO: 1.45 10*3/MM3 (ref 0.7–3.1)
LYMPHOCYTES # BLD AUTO: 1.72 10*3/MM3 (ref 0.7–3.1)
LYMPHOCYTES # BLD AUTO: 1.72 10*3/MM3 (ref 0.7–3.1)
LYMPHOCYTES # BLD AUTO: 2.17 10*3/MM3 (ref 0.7–3.1)
LYMPHOCYTES NFR BLD AUTO: 16.8 % (ref 19.6–45.3)
LYMPHOCYTES NFR BLD AUTO: 17.6 % (ref 19.6–45.3)
LYMPHOCYTES NFR BLD AUTO: 19.4 % (ref 19.6–45.3)
LYMPHOCYTES NFR BLD AUTO: 19.9 % (ref 19.6–45.3)
LYMPHOCYTES NFR BLD AUTO: 30.8 % (ref 19.6–45.3)
M PNEUMO IGG SER IA-ACNC: NOT DETECTED
MAGNESIUM SERPL-MCNC: 2.2 MG/DL (ref 1.6–2.4)
MCH RBC QN AUTO: 24.5 PG (ref 26.6–33)
MCH RBC QN AUTO: 24.7 PG (ref 26.6–33)
MCH RBC QN AUTO: 24.9 PG (ref 26.6–33)
MCH RBC QN AUTO: 24.9 PG (ref 26.6–33)
MCH RBC QN AUTO: 25 PG (ref 26.6–33)
MCH RBC QN AUTO: 25 PG (ref 26.6–33)
MCH RBC QN AUTO: 25.1 PG (ref 26.6–33)
MCHC RBC AUTO-ENTMCNC: 30.1 G/DL (ref 31.5–35.7)
MCHC RBC AUTO-ENTMCNC: 30.1 G/DL (ref 31.5–35.7)
MCHC RBC AUTO-ENTMCNC: 30.3 G/DL (ref 31.5–35.7)
MCHC RBC AUTO-ENTMCNC: 30.7 G/DL (ref 31.5–35.7)
MCHC RBC AUTO-ENTMCNC: 30.9 G/DL (ref 31.5–35.7)
MCHC RBC AUTO-ENTMCNC: 31 G/DL (ref 31.5–35.7)
MCHC RBC AUTO-ENTMCNC: 31.4 G/DL (ref 31.5–35.7)
MCV RBC AUTO: 79.5 FL (ref 79–97)
MCV RBC AUTO: 80.5 FL (ref 79–97)
MCV RBC AUTO: 80.8 FL (ref 79–97)
MCV RBC AUTO: 81.2 FL (ref 79–97)
MCV RBC AUTO: 81.3 FL (ref 79–97)
MCV RBC AUTO: 82.3 FL (ref 79–97)
MCV RBC AUTO: 82.8 FL (ref 79–97)
MONOCYTES # BLD AUTO: 0.68 10*3/MM3 (ref 0.1–0.9)
MONOCYTES # BLD AUTO: 1.11 10*3/MM3 (ref 0.1–0.9)
MONOCYTES # BLD AUTO: 1.13 10*3/MM3 (ref 0.1–0.9)
MONOCYTES # BLD AUTO: 1.2 10*3/MM3 (ref 0.1–0.9)
MONOCYTES # BLD AUTO: 1.33 10*3/MM3 (ref 0.1–0.9)
MONOCYTES NFR BLD AUTO: 12.8 % (ref 5–12)
MONOCYTES NFR BLD AUTO: 14.6 % (ref 5–12)
MONOCYTES NFR BLD AUTO: 14.6 % (ref 5–12)
MONOCYTES NFR BLD AUTO: 15 % (ref 5–12)
MONOCYTES NFR BLD AUTO: 9.7 % (ref 5–12)
NEUTROPHILS NFR BLD AUTO: 3.93 10*3/MM3 (ref 1.7–7)
NEUTROPHILS NFR BLD AUTO: 4.98 10*3/MM3 (ref 1.7–7)
NEUTROPHILS NFR BLD AUTO: 5.13 10*3/MM3 (ref 1.7–7)
NEUTROPHILS NFR BLD AUTO: 5.35 10*3/MM3 (ref 1.7–7)
NEUTROPHILS NFR BLD AUTO: 5.64 10*3/MM3 (ref 1.7–7)
NEUTROPHILS NFR BLD AUTO: 55.9 % (ref 42.7–76)
NEUTROPHILS NFR BLD AUTO: 60.2 % (ref 42.7–76)
NEUTROPHILS NFR BLD AUTO: 62.4 % (ref 42.7–76)
NEUTROPHILS NFR BLD AUTO: 64.3 % (ref 42.7–76)
NEUTROPHILS NFR BLD AUTO: 65.3 % (ref 42.7–76)
NITRITE UR QL STRIP: NEGATIVE
NRBC BLD AUTO-RTO: 0 /100 WBC (ref 0–0.2)
NT-PROBNP SERPL-MCNC: 3635 PG/ML (ref 0–1800)
PH UR STRIP.AUTO: 6 [PH] (ref 5–8)
PHOSPHATE SERPL-MCNC: 3.7 MG/DL (ref 2.5–4.5)
PHOSPHATE SERPL-MCNC: 4.7 MG/DL (ref 2.5–4.5)
PHOSPHATE SERPL-MCNC: 5.6 MG/DL (ref 2.5–4.5)
PLATELET # BLD AUTO: 187 10*3/MM3 (ref 140–450)
PLATELET # BLD AUTO: 193 10*3/MM3 (ref 140–450)
PLATELET # BLD AUTO: 204 10*3/MM3 (ref 140–450)
PLATELET # BLD AUTO: 214 10*3/MM3 (ref 140–450)
PLATELET # BLD AUTO: 217 10*3/MM3 (ref 140–450)
PLATELET # BLD AUTO: 226 10*3/MM3 (ref 140–450)
PLATELET # BLD AUTO: 243 10*3/MM3 (ref 140–450)
PMV BLD AUTO: 8.4 FL (ref 6–12)
PMV BLD AUTO: 8.5 FL (ref 6–12)
PMV BLD AUTO: 8.5 FL (ref 6–12)
PMV BLD AUTO: 8.7 FL (ref 6–12)
PMV BLD AUTO: 8.8 FL (ref 6–12)
PMV BLD AUTO: 8.8 FL (ref 6–12)
PMV BLD AUTO: 9 FL (ref 6–12)
POTASSIUM SERPL-SCNC: 3.9 MMOL/L (ref 3.5–5.2)
POTASSIUM SERPL-SCNC: 3.9 MMOL/L (ref 3.5–5.2)
POTASSIUM SERPL-SCNC: 4 MMOL/L (ref 3.5–5.2)
POTASSIUM SERPL-SCNC: 4 MMOL/L (ref 3.5–5.2)
POTASSIUM SERPL-SCNC: 4.1 MMOL/L (ref 3.5–5.2)
POTASSIUM SERPL-SCNC: 4.1 MMOL/L (ref 3.5–5.2)
POTASSIUM SERPL-SCNC: 4.3 MMOL/L (ref 3.5–5.2)
POTASSIUM SERPL-SCNC: 5 MMOL/L (ref 3.5–5.2)
PROCALCITONIN SERPL-MCNC: 0.1 NG/ML (ref 0–0.25)
PROT ?TM UR-MCNC: 7.8 MG/DL
PROT PATTERN SERPL IFE-IMP: NORMAL
PROT SERPL-MCNC: 6.2 G/DL (ref 6–8.5)
PROT SERPL-MCNC: 6.3 G/DL (ref 6–8.5)
PROT SERPL-MCNC: 6.5 G/DL (ref 6–8.5)
PROT SERPL-MCNC: 6.8 G/DL (ref 6–8.5)
PROT SERPL-MCNC: 7.2 G/DL (ref 6–8.5)
PROT UR QL STRIP: NEGATIVE
PROT/CREAT UR: 99.4 MG/G CREA (ref 0–200)
PROTHROMBIN TIME: 12.6 SECONDS (ref 11.7–14.2)
QT INTERVAL: 296 MS
QT INTERVAL: 351 MS
QT INTERVAL: 399 MS
QT INTERVAL: 437 MS
QT INTERVAL: 459 MS
RBC # BLD AUTO: 4.01 10*6/MM3 (ref 3.77–5.28)
RBC # BLD AUTO: 4.13 10*6/MM3 (ref 3.77–5.28)
RBC # BLD AUTO: 4.15 10*6/MM3 (ref 3.77–5.28)
RBC # BLD AUTO: 4.16 10*6/MM3 (ref 3.77–5.28)
RBC # BLD AUTO: 4.17 10*6/MM3 (ref 3.77–5.28)
RBC # BLD AUTO: 4.2 10*6/MM3 (ref 3.77–5.28)
RBC # BLD AUTO: 4.33 10*6/MM3 (ref 3.77–5.28)
RHINOVIRUS RNA SPEC NAA+PROBE: NOT DETECTED
RSV RNA NPH QL NAA+NON-PROBE: NOT DETECTED
SARS-COV-2 RNA NPH QL NAA+NON-PROBE: NOT DETECTED
SINUS: 2.9 CM
SODIUM SERPL-SCNC: 136 MMOL/L (ref 136–145)
SODIUM SERPL-SCNC: 137 MMOL/L (ref 136–145)
SODIUM SERPL-SCNC: 139 MMOL/L (ref 136–145)
SODIUM SERPL-SCNC: 139 MMOL/L (ref 136–145)
SODIUM SERPL-SCNC: 140 MMOL/L (ref 136–145)
SODIUM SERPL-SCNC: 142 MMOL/L (ref 136–145)
SODIUM SERPL-SCNC: 142 MMOL/L (ref 136–145)
SODIUM SERPL-SCNC: 144 MMOL/L (ref 136–145)
SODIUM UR-SCNC: 56 MMOL/L
SP GR UR STRIP: 1.01 (ref 1–1.03)
STJ: 2.46 CM
TIBC SERPL-MCNC: 374 MCG/DL (ref 298–536)
TRANSFERRIN SERPL-MCNC: 251 MG/DL (ref 200–360)
TROPONIN T DELTA: 8 NG/L
TROPONIN T SERPL HS-MCNC: 35 NG/L
TROPONIN T SERPL HS-MCNC: 40 NG/L
TROPONIN T SERPL HS-MCNC: 44 NG/L
URATE SERPL-MCNC: 9.4 MG/DL (ref 2.4–5.7)
URATE SERPL-MCNC: 9.5 MG/DL (ref 2.4–5.7)
URATE SERPL-MCNC: 9.9 MG/DL (ref 2.4–5.7)
UROBILINOGEN UR QL STRIP: NORMAL
WBC NRBC COR # BLD: 6.01 10*3/MM3 (ref 3.4–10.8)
WBC NRBC COR # BLD: 7.04 10*3/MM3 (ref 3.4–10.8)
WBC NRBC COR # BLD: 7.33 10*3/MM3 (ref 3.4–10.8)
WBC NRBC COR # BLD: 7.74 10*3/MM3 (ref 3.4–10.8)
WBC NRBC COR # BLD: 8.23 10*3/MM3 (ref 3.4–10.8)
WBC NRBC COR # BLD: 8.65 10*3/MM3 (ref 3.4–10.8)
WBC NRBC COR # BLD: 8.88 10*3/MM3 (ref 3.4–10.8)

## 2023-01-01 PROCEDURE — 85025 COMPLETE CBC W/AUTO DIFF WBC: CPT | Performed by: INTERNAL MEDICINE

## 2023-01-01 PROCEDURE — 83605 ASSAY OF LACTIC ACID: CPT | Performed by: INTERNAL MEDICINE

## 2023-01-01 PROCEDURE — 94761 N-INVAS EAR/PLS OXIMETRY MLT: CPT

## 2023-01-01 PROCEDURE — 85007 BL SMEAR W/DIFF WBC COUNT: CPT | Performed by: INTERNAL MEDICINE

## 2023-01-01 PROCEDURE — 82977 ASSAY OF GGT: CPT | Performed by: INTERNAL MEDICINE

## 2023-01-01 PROCEDURE — 94640 AIRWAY INHALATION TREATMENT: CPT

## 2023-01-01 PROCEDURE — 25010000002 ONDANSETRON PER 1 MG: Performed by: INTERNAL MEDICINE

## 2023-01-01 PROCEDURE — 99285 EMERGENCY DEPT VISIT HI MDM: CPT

## 2023-01-01 PROCEDURE — 85027 COMPLETE CBC AUTOMATED: CPT | Performed by: INTERNAL MEDICINE

## 2023-01-01 PROCEDURE — 84484 ASSAY OF TROPONIN QUANT: CPT | Performed by: NURSE PRACTITIONER

## 2023-01-01 PROCEDURE — 25010000002 LORAZEPAM PER 2 MG: Performed by: INTERNAL MEDICINE

## 2023-01-01 PROCEDURE — 84484 ASSAY OF TROPONIN QUANT: CPT | Performed by: INTERNAL MEDICINE

## 2023-01-01 PROCEDURE — 93306 TTE W/DOPPLER COMPLETE: CPT

## 2023-01-01 PROCEDURE — 25510000001 PERFLUTREN (DEFINITY) 8.476 MG IN SODIUM CHLORIDE (PF) 0.9 % 10 ML INJECTION: Performed by: NURSE PRACTITIONER

## 2023-01-01 PROCEDURE — 93010 ELECTROCARDIOGRAM REPORT: CPT | Performed by: INTERNAL MEDICINE

## 2023-01-01 PROCEDURE — 80048 BASIC METABOLIC PNL TOTAL CA: CPT | Performed by: INTERNAL MEDICINE

## 2023-01-01 PROCEDURE — 83735 ASSAY OF MAGNESIUM: CPT | Performed by: INTERNAL MEDICINE

## 2023-01-01 PROCEDURE — 82948 REAGENT STRIP/BLOOD GLUCOSE: CPT

## 2023-01-01 PROCEDURE — 99221 1ST HOSP IP/OBS SF/LOW 40: CPT | Performed by: NURSE PRACTITIONER

## 2023-01-01 PROCEDURE — 25010000002 HYDROMORPHONE 1 MG/ML SOLUTION: Performed by: INTERNAL MEDICINE

## 2023-01-01 PROCEDURE — 71045 X-RAY EXAM CHEST 1 VIEW: CPT

## 2023-01-01 PROCEDURE — 25010000002 DOBUTAMINE PER 250 MG: Performed by: NURSE PRACTITIONER

## 2023-01-01 PROCEDURE — 84466 ASSAY OF TRANSFERRIN: CPT | Performed by: INTERNAL MEDICINE

## 2023-01-01 PROCEDURE — 93005 ELECTROCARDIOGRAM TRACING: CPT | Performed by: INTERNAL MEDICINE

## 2023-01-01 PROCEDURE — 94799 UNLISTED PULMONARY SVC/PX: CPT

## 2023-01-01 PROCEDURE — 25010000002 FUROSEMIDE PER 20 MG: Performed by: INTERNAL MEDICINE

## 2023-01-01 PROCEDURE — 25010000002 HYDROMORPHONE PER 4 MG: Performed by: INTERNAL MEDICINE

## 2023-01-01 PROCEDURE — 97166 OT EVAL MOD COMPLEX 45 MIN: CPT

## 2023-01-01 PROCEDURE — 97162 PT EVAL MOD COMPLEX 30 MIN: CPT | Performed by: PHYSICAL THERAPIST

## 2023-01-01 PROCEDURE — 84550 ASSAY OF BLOOD/URIC ACID: CPT | Performed by: INTERNAL MEDICINE

## 2023-01-01 PROCEDURE — 25010000002 DIPHENHYDRAMINE PER 50 MG: Performed by: INTERNAL MEDICINE

## 2023-01-01 PROCEDURE — 25010000002 FENTANYL CITRATE (PF) 50 MCG/ML SOLUTION: Performed by: INTERNAL MEDICINE

## 2023-01-01 PROCEDURE — 82728 ASSAY OF FERRITIN: CPT | Performed by: INTERNAL MEDICINE

## 2023-01-01 PROCEDURE — 80053 COMPREHEN METABOLIC PANEL: CPT | Performed by: EMERGENCY MEDICINE

## 2023-01-01 PROCEDURE — 80053 COMPREHEN METABOLIC PANEL: CPT | Performed by: INTERNAL MEDICINE

## 2023-01-01 PROCEDURE — 0202U NFCT DS 22 TRGT SARS-COV-2: CPT | Performed by: EMERGENCY MEDICINE

## 2023-01-01 PROCEDURE — 84484 ASSAY OF TROPONIN QUANT: CPT | Performed by: EMERGENCY MEDICINE

## 2023-01-01 PROCEDURE — 72125 CT NECK SPINE W/O DYE: CPT

## 2023-01-01 PROCEDURE — 85610 PROTHROMBIN TIME: CPT | Performed by: EMERGENCY MEDICINE

## 2023-01-01 PROCEDURE — 85025 COMPLETE CBC W/AUTO DIFF WBC: CPT | Performed by: EMERGENCY MEDICINE

## 2023-01-01 PROCEDURE — 83880 ASSAY OF NATRIURETIC PEPTIDE: CPT | Performed by: EMERGENCY MEDICINE

## 2023-01-01 PROCEDURE — 84100 ASSAY OF PHOSPHORUS: CPT | Performed by: INTERNAL MEDICINE

## 2023-01-01 PROCEDURE — 83690 ASSAY OF LIPASE: CPT | Performed by: INTERNAL MEDICINE

## 2023-01-01 PROCEDURE — 25010000002 MORPHINE PER 10 MG: Performed by: INTERNAL MEDICINE

## 2023-01-01 PROCEDURE — 63710000001 DIPHENHYDRAMINE PER 50 MG: Performed by: INTERNAL MEDICINE

## 2023-01-01 PROCEDURE — 82436 ASSAY OF URINE CHLORIDE: CPT | Performed by: INTERNAL MEDICINE

## 2023-01-01 PROCEDURE — 83540 ASSAY OF IRON: CPT | Performed by: INTERNAL MEDICINE

## 2023-01-01 PROCEDURE — 25010000002 MORPHINE PER 10 MG: Performed by: NURSE PRACTITIONER

## 2023-01-01 PROCEDURE — 94760 N-INVAS EAR/PLS OXIMETRY 1: CPT

## 2023-01-01 PROCEDURE — 80069 RENAL FUNCTION PANEL: CPT | Performed by: INTERNAL MEDICINE

## 2023-01-01 PROCEDURE — 83521 IG LIGHT CHAINS FREE EACH: CPT | Performed by: INTERNAL MEDICINE

## 2023-01-01 PROCEDURE — 84156 ASSAY OF PROTEIN URINE: CPT | Performed by: INTERNAL MEDICINE

## 2023-01-01 PROCEDURE — 82784 ASSAY IGA/IGD/IGG/IGM EACH: CPT | Performed by: INTERNAL MEDICINE

## 2023-01-01 PROCEDURE — 82570 ASSAY OF URINE CREATININE: CPT | Performed by: INTERNAL MEDICINE

## 2023-01-01 PROCEDURE — 86334 IMMUNOFIX E-PHORESIS SERUM: CPT | Performed by: INTERNAL MEDICINE

## 2023-01-01 PROCEDURE — 93005 ELECTROCARDIOGRAM TRACING: CPT | Performed by: EMERGENCY MEDICINE

## 2023-01-01 PROCEDURE — 76705 ECHO EXAM OF ABDOMEN: CPT

## 2023-01-01 PROCEDURE — 63710000001 PROMETHAZINE PER 12.5 MG: Performed by: INTERNAL MEDICINE

## 2023-01-01 PROCEDURE — 84145 PROCALCITONIN (PCT): CPT | Performed by: EMERGENCY MEDICINE

## 2023-01-01 PROCEDURE — 80076 HEPATIC FUNCTION PANEL: CPT | Performed by: INTERNAL MEDICINE

## 2023-01-01 PROCEDURE — 36415 COLL VENOUS BLD VENIPUNCTURE: CPT | Performed by: INTERNAL MEDICINE

## 2023-01-01 PROCEDURE — 25010000002 PROCHLORPERAZINE 10 MG/2ML SOLUTION: Performed by: INTERNAL MEDICINE

## 2023-01-01 PROCEDURE — 25010000002 DOBUTAMINE PER 250 MG

## 2023-01-01 PROCEDURE — 84300 ASSAY OF URINE SODIUM: CPT | Performed by: INTERNAL MEDICINE

## 2023-01-01 PROCEDURE — 81003 URINALYSIS AUTO W/O SCOPE: CPT | Performed by: INTERNAL MEDICINE

## 2023-01-01 RX ORDER — ROSUVASTATIN CALCIUM 10 MG/1
10 TABLET, COATED ORAL DAILY
Status: DISCONTINUED | OUTPATIENT
Start: 2023-01-01 | End: 2023-01-01

## 2023-01-01 RX ORDER — ISOSORBIDE MONONITRATE 60 MG/1
60 TABLET, EXTENDED RELEASE ORAL DAILY
Status: DISCONTINUED | OUTPATIENT
Start: 2023-01-01 | End: 2023-01-01

## 2023-01-01 RX ORDER — LORAZEPAM 2 MG/ML
0.5 INJECTION INTRAMUSCULAR
Status: DISCONTINUED | OUTPATIENT
Start: 2023-01-01 | End: 2023-01-01 | Stop reason: HOSPADM

## 2023-01-01 RX ORDER — DIPHENHYDRAMINE HYDROCHLORIDE 50 MG/ML
25 INJECTION INTRAMUSCULAR; INTRAVENOUS EVERY 6 HOURS PRN
Status: DISCONTINUED | OUTPATIENT
Start: 2023-01-01 | End: 2023-01-01

## 2023-01-01 RX ORDER — ONDANSETRON 4 MG/1
4 TABLET, FILM COATED ORAL EVERY 6 HOURS PRN
Status: DISCONTINUED | OUTPATIENT
Start: 2023-01-01 | End: 2023-01-01 | Stop reason: HOSPADM

## 2023-01-01 RX ORDER — NICOTINE POLACRILEX 4 MG
15 LOZENGE BUCCAL
Status: DISCONTINUED | OUTPATIENT
Start: 2023-01-01 | End: 2023-01-01

## 2023-01-01 RX ORDER — DOBUTAMINE HYDROCHLORIDE 100 MG/100ML
5 INJECTION INTRAVENOUS
Status: DISCONTINUED | OUTPATIENT
Start: 2023-01-01 | End: 2023-01-01

## 2023-01-01 RX ORDER — PANTOPRAZOLE SODIUM 40 MG/10ML
40 INJECTION, POWDER, LYOPHILIZED, FOR SOLUTION INTRAVENOUS
Status: DISCONTINUED | OUTPATIENT
Start: 2023-01-01 | End: 2023-01-01 | Stop reason: HOSPADM

## 2023-01-01 RX ORDER — PROCHLORPERAZINE EDISYLATE 5 MG/ML
5 INJECTION INTRAMUSCULAR; INTRAVENOUS EVERY 6 HOURS PRN
Status: DISCONTINUED | OUTPATIENT
Start: 2023-01-01 | End: 2023-01-01 | Stop reason: HOSPADM

## 2023-01-01 RX ORDER — UREA 10 %
3 LOTION (ML) TOPICAL NIGHTLY PRN
Status: DISCONTINUED | OUTPATIENT
Start: 2023-01-01 | End: 2023-01-01

## 2023-01-01 RX ORDER — LORAZEPAM 2 MG/ML
1 INJECTION INTRAMUSCULAR
Status: DISCONTINUED | OUTPATIENT
Start: 2023-01-01 | End: 2023-01-01 | Stop reason: HOSPADM

## 2023-01-01 RX ORDER — HYDROCODONE BITARTRATE AND ACETAMINOPHEN 5; 325 MG/1; MG/1
1 TABLET ORAL EVERY 4 HOURS PRN
Status: DISCONTINUED | OUTPATIENT
Start: 2023-01-01 | End: 2023-01-01

## 2023-01-01 RX ORDER — BUMETANIDE 0.25 MG/ML
2 INJECTION INTRAMUSCULAR; INTRAVENOUS ONCE
Status: COMPLETED | OUTPATIENT
Start: 2023-01-01 | End: 2023-01-01

## 2023-01-01 RX ORDER — LORAZEPAM 2 MG/ML
1 CONCENTRATE ORAL
Status: DISCONTINUED | OUTPATIENT
Start: 2023-01-01 | End: 2023-01-01 | Stop reason: HOSPADM

## 2023-01-01 RX ORDER — HALOPERIDOL 5 MG/ML
2 INJECTION INTRAMUSCULAR EVERY 4 HOURS PRN
Status: DISCONTINUED | OUTPATIENT
Start: 2023-01-01 | End: 2023-01-01 | Stop reason: HOSPADM

## 2023-01-01 RX ORDER — HALOPERIDOL 2 MG/1
2 TABLET ORAL EVERY 4 HOURS PRN
Status: DISCONTINUED | OUTPATIENT
Start: 2023-01-01 | End: 2023-01-01 | Stop reason: HOSPADM

## 2023-01-01 RX ORDER — AMOXICILLIN 250 MG
2 CAPSULE ORAL 2 TIMES DAILY
Status: DISCONTINUED | OUTPATIENT
Start: 2023-01-01 | End: 2023-01-01

## 2023-01-01 RX ORDER — FENTANYL CITRATE 50 UG/ML
12.5 INJECTION, SOLUTION INTRAMUSCULAR; INTRAVENOUS EVERY 4 HOURS PRN
Status: DISCONTINUED | OUTPATIENT
Start: 2023-01-01 | End: 2023-01-01

## 2023-01-01 RX ORDER — AMOXICILLIN 250 MG
2 CAPSULE ORAL 2 TIMES DAILY
Status: DISCONTINUED | OUTPATIENT
Start: 2023-01-01 | End: 2023-01-01 | Stop reason: HOSPADM

## 2023-01-01 RX ORDER — HALOPERIDOL 2 MG/ML
1 SOLUTION ORAL EVERY 4 HOURS PRN
Status: DISCONTINUED | OUTPATIENT
Start: 2023-01-01 | End: 2023-01-01 | Stop reason: HOSPADM

## 2023-01-01 RX ORDER — PROCHLORPERAZINE MALEATE 10 MG
5 TABLET ORAL EVERY 6 HOURS PRN
Status: DISCONTINUED | OUTPATIENT
Start: 2023-01-01 | End: 2023-01-01 | Stop reason: HOSPADM

## 2023-01-01 RX ORDER — POLYETHYLENE GLYCOL 3350 17 G/17G
17 POWDER, FOR SOLUTION ORAL DAILY PRN
Status: DISCONTINUED | OUTPATIENT
Start: 2023-01-01 | End: 2023-01-01

## 2023-01-01 RX ORDER — ACETAMINOPHEN 325 MG/1
650 TABLET ORAL EVERY 4 HOURS PRN
Status: DISCONTINUED | OUTPATIENT
Start: 2023-01-01 | End: 2023-01-01 | Stop reason: HOSPADM

## 2023-01-01 RX ORDER — LORAZEPAM 2 MG/ML
2 INJECTION INTRAMUSCULAR
Status: DISCONTINUED | OUTPATIENT
Start: 2023-01-01 | End: 2023-01-01 | Stop reason: HOSPADM

## 2023-01-01 RX ORDER — ONDANSETRON 2 MG/ML
4 INJECTION INTRAMUSCULAR; INTRAVENOUS EVERY 6 HOURS PRN
Status: DISCONTINUED | OUTPATIENT
Start: 2023-01-01 | End: 2023-01-01 | Stop reason: HOSPADM

## 2023-01-01 RX ORDER — ONDANSETRON 4 MG/1
4 TABLET, FILM COATED ORAL EVERY 6 HOURS PRN
Status: DISCONTINUED | OUTPATIENT
Start: 2023-01-01 | End: 2023-01-01

## 2023-01-01 RX ORDER — GLYCOPYRROLATE 0.2 MG/ML
0.2 INJECTION INTRAMUSCULAR; INTRAVENOUS
Status: DISCONTINUED | OUTPATIENT
Start: 2023-01-01 | End: 2023-01-01 | Stop reason: HOSPADM

## 2023-01-01 RX ORDER — ADENOSINE 3 MG/ML
INJECTION, SOLUTION INTRAVENOUS
Status: ACTIVE
Start: 2023-01-01 | End: 2023-01-01

## 2023-01-01 RX ORDER — ONDANSETRON 4 MG/1
4 TABLET, FILM COATED ORAL EVERY 4 HOURS PRN
Status: DISCONTINUED | OUTPATIENT
Start: 2023-01-01 | End: 2023-01-01

## 2023-01-01 RX ORDER — IPRATROPIUM BROMIDE AND ALBUTEROL SULFATE 2.5; .5 MG/3ML; MG/3ML
3 SOLUTION RESPIRATORY (INHALATION) ONCE
Status: COMPLETED | OUTPATIENT
Start: 2023-01-01 | End: 2023-01-01

## 2023-01-01 RX ORDER — PROMETHAZINE HYDROCHLORIDE 12.5 MG/1
12.5 TABLET ORAL EVERY 6 HOURS PRN
Status: DISCONTINUED | OUTPATIENT
Start: 2023-01-01 | End: 2023-01-01

## 2023-01-01 RX ORDER — FUROSEMIDE 10 MG/ML
80 INJECTION INTRAMUSCULAR; INTRAVENOUS EVERY 12 HOURS
Status: DISCONTINUED | OUTPATIENT
Start: 2023-01-01 | End: 2023-01-01

## 2023-01-01 RX ORDER — ZOLPIDEM TARTRATE 5 MG/1
5 TABLET ORAL NIGHTLY PRN
Status: DISCONTINUED | OUTPATIENT
Start: 2023-01-01 | End: 2023-01-01 | Stop reason: HOSPADM

## 2023-01-01 RX ORDER — INSULIN LISPRO 100 [IU]/ML
2-9 INJECTION, SOLUTION INTRAVENOUS; SUBCUTANEOUS
Status: DISCONTINUED | OUTPATIENT
Start: 2023-01-01 | End: 2023-01-01

## 2023-01-01 RX ORDER — PRASUGREL 10 MG/1
5 TABLET, FILM COATED ORAL DAILY
Status: DISCONTINUED | OUTPATIENT
Start: 2023-01-01 | End: 2023-01-01

## 2023-01-01 RX ORDER — SODIUM CHLORIDE 0.9 % (FLUSH) 0.9 %
10 SYRINGE (ML) INJECTION AS NEEDED
Status: DISCONTINUED | OUTPATIENT
Start: 2023-01-01 | End: 2023-01-01

## 2023-01-01 RX ORDER — HALOPERIDOL 5 MG/ML
1 INJECTION INTRAMUSCULAR EVERY 4 HOURS PRN
Status: DISCONTINUED | OUTPATIENT
Start: 2023-01-01 | End: 2023-01-01 | Stop reason: HOSPADM

## 2023-01-01 RX ORDER — MORPHINE SULFATE 10 MG/ML
6 INJECTION INTRAMUSCULAR; INTRAVENOUS; SUBCUTANEOUS
Status: DISCONTINUED | OUTPATIENT
Start: 2023-01-01 | End: 2023-01-01 | Stop reason: HOSPADM

## 2023-01-01 RX ORDER — HALOPERIDOL 1 MG/1
1 TABLET ORAL EVERY 4 HOURS PRN
Status: DISCONTINUED | OUTPATIENT
Start: 2023-01-01 | End: 2023-01-01 | Stop reason: HOSPADM

## 2023-01-01 RX ORDER — MORPHINE SULFATE 2 MG/ML
4 INJECTION, SOLUTION INTRAMUSCULAR; INTRAVENOUS
Status: DISCONTINUED | OUTPATIENT
Start: 2023-01-01 | End: 2023-01-01 | Stop reason: HOSPADM

## 2023-01-01 RX ORDER — ACETAMINOPHEN 160 MG/5ML
650 SOLUTION ORAL EVERY 4 HOURS PRN
Status: DISCONTINUED | OUTPATIENT
Start: 2023-01-01 | End: 2023-01-01 | Stop reason: HOSPADM

## 2023-01-01 RX ORDER — SCOLOPAMINE TRANSDERMAL SYSTEM 1 MG/1
1 PATCH, EXTENDED RELEASE TRANSDERMAL
Status: DISCONTINUED | OUTPATIENT
Start: 2023-01-01 | End: 2023-01-01 | Stop reason: HOSPADM

## 2023-01-01 RX ORDER — ACETAMINOPHEN 325 MG/1
650 TABLET ORAL EVERY 4 HOURS PRN
Status: DISCONTINUED | OUTPATIENT
Start: 2023-01-01 | End: 2023-01-01 | Stop reason: SDUPTHER

## 2023-01-01 RX ORDER — NOREPINEPHRINE BITARTRATE 0.03 MG/ML
INJECTION, SOLUTION INTRAVENOUS
Status: COMPLETED
Start: 2023-01-01 | End: 2023-01-01

## 2023-01-01 RX ORDER — MORPHINE SULFATE 2 MG/ML
2 INJECTION, SOLUTION INTRAMUSCULAR; INTRAVENOUS
Status: DISCONTINUED | OUTPATIENT
Start: 2023-01-01 | End: 2023-01-01

## 2023-01-01 RX ORDER — DEXTROSE MONOHYDRATE 25 G/50ML
25 INJECTION, SOLUTION INTRAVENOUS
Status: DISCONTINUED | OUTPATIENT
Start: 2023-01-01 | End: 2023-01-01

## 2023-01-01 RX ORDER — HYDROMORPHONE HYDROCHLORIDE 1 MG/ML
0.5 INJECTION, SOLUTION INTRAMUSCULAR; INTRAVENOUS; SUBCUTANEOUS
Status: DISCONTINUED | OUTPATIENT
Start: 2023-01-01 | End: 2023-01-01 | Stop reason: HOSPADM

## 2023-01-01 RX ORDER — HALOPERIDOL 2 MG/ML
2 SOLUTION ORAL EVERY 4 HOURS PRN
Status: DISCONTINUED | OUTPATIENT
Start: 2023-01-01 | End: 2023-01-01 | Stop reason: HOSPADM

## 2023-01-01 RX ORDER — TORSEMIDE 20 MG/1
40 TABLET ORAL
Status: DISCONTINUED | OUTPATIENT
Start: 2023-01-01 | End: 2023-01-01

## 2023-01-01 RX ORDER — HYDROMORPHONE HYDROCHLORIDE 1 MG/ML
0.5 INJECTION, SOLUTION INTRAMUSCULAR; INTRAVENOUS; SUBCUTANEOUS
Status: DISCONTINUED | OUTPATIENT
Start: 2023-01-01 | End: 2023-01-01

## 2023-01-01 RX ORDER — DIPHENHYDRAMINE HYDROCHLORIDE 50 MG/ML
25 INJECTION INTRAMUSCULAR; INTRAVENOUS EVERY 6 HOURS PRN
Status: DISCONTINUED | OUTPATIENT
Start: 2023-01-01 | End: 2023-01-01 | Stop reason: HOSPADM

## 2023-01-01 RX ORDER — BISACODYL 5 MG/1
5 TABLET, DELAYED RELEASE ORAL DAILY PRN
Status: DISCONTINUED | OUTPATIENT
Start: 2023-01-01 | End: 2023-01-01 | Stop reason: HOSPADM

## 2023-01-01 RX ORDER — DIPHENHYDRAMINE HYDROCHLORIDE AND LIDOCAINE HYDROCHLORIDE AND ALUMINUM HYDROXIDE AND MAGNESIUM HYDRO
5 KIT EVERY 4 HOURS PRN
Status: DISCONTINUED | OUTPATIENT
Start: 2023-01-01 | End: 2023-01-01 | Stop reason: HOSPADM

## 2023-01-01 RX ORDER — DIPHENHYDRAMINE HCL 12.5MG/5ML
25 LIQUID (ML) ORAL EVERY 6 HOURS PRN
Status: DISCONTINUED | OUTPATIENT
Start: 2023-01-01 | End: 2023-01-01

## 2023-01-01 RX ORDER — ONDANSETRON 2 MG/ML
4 INJECTION INTRAMUSCULAR; INTRAVENOUS EVERY 6 HOURS PRN
Status: DISCONTINUED | OUTPATIENT
Start: 2023-01-01 | End: 2023-01-01

## 2023-01-01 RX ORDER — DIPHENOXYLATE HYDROCHLORIDE AND ATROPINE SULFATE 2.5; .025 MG/1; MG/1
1 TABLET ORAL
Status: DISCONTINUED | OUTPATIENT
Start: 2023-01-01 | End: 2023-01-01 | Stop reason: HOSPADM

## 2023-01-01 RX ORDER — ALBUTEROL SULFATE 2.5 MG/3ML
2.5 SOLUTION RESPIRATORY (INHALATION) EVERY 6 HOURS PRN
Status: DISCONTINUED | OUTPATIENT
Start: 2023-01-01 | End: 2023-01-01

## 2023-01-01 RX ORDER — DOBUTAMINE HYDROCHLORIDE 100 MG/100ML
INJECTION INTRAVENOUS
Status: COMPLETED
Start: 2023-01-01 | End: 2023-01-01

## 2023-01-01 RX ORDER — PANTOPRAZOLE SODIUM 40 MG/1
40 TABLET, DELAYED RELEASE ORAL DAILY
Status: DISCONTINUED | OUTPATIENT
Start: 2023-01-01 | End: 2023-01-01

## 2023-01-01 RX ORDER — HYDROMORPHONE HYDROCHLORIDE 2 MG/ML
1.5 INJECTION, SOLUTION INTRAMUSCULAR; INTRAVENOUS; SUBCUTANEOUS
Status: DISCONTINUED | OUTPATIENT
Start: 2023-01-01 | End: 2023-01-01 | Stop reason: HOSPADM

## 2023-01-01 RX ORDER — POLYETHYLENE GLYCOL 3350 17 G/17G
17 POWDER, FOR SOLUTION ORAL DAILY PRN
Status: DISCONTINUED | OUTPATIENT
Start: 2023-01-01 | End: 2023-01-01 | Stop reason: HOSPADM

## 2023-01-01 RX ORDER — POTASSIUM CHLORIDE 1.5 G/1.58G
20 POWDER, FOR SOLUTION ORAL ONCE
Status: DISCONTINUED | OUTPATIENT
Start: 2023-01-01 | End: 2023-01-01

## 2023-01-01 RX ORDER — MORPHINE SULFATE 20 MG/ML
20 SOLUTION ORAL
Status: DISCONTINUED | OUTPATIENT
Start: 2023-01-01 | End: 2023-01-01 | Stop reason: HOSPADM

## 2023-01-01 RX ORDER — BISACODYL 10 MG
10 SUPPOSITORY, RECTAL RECTAL DAILY PRN
Status: DISCONTINUED | OUTPATIENT
Start: 2023-01-01 | End: 2023-01-01 | Stop reason: HOSPADM

## 2023-01-01 RX ORDER — PROCHLORPERAZINE 25 MG
25 SUPPOSITORY, RECTAL RECTAL EVERY 12 HOURS PRN
Status: DISCONTINUED | OUTPATIENT
Start: 2023-01-01 | End: 2023-01-01 | Stop reason: HOSPADM

## 2023-01-01 RX ORDER — NALOXONE HCL 0.4 MG/ML
0.4 VIAL (ML) INJECTION
Status: DISCONTINUED | OUTPATIENT
Start: 2023-01-01 | End: 2023-01-01

## 2023-01-01 RX ORDER — BISACODYL 5 MG/1
5 TABLET, DELAYED RELEASE ORAL DAILY PRN
Status: DISCONTINUED | OUTPATIENT
Start: 2023-01-01 | End: 2023-01-01

## 2023-01-01 RX ORDER — LORAZEPAM 2 MG/ML
0.5 CONCENTRATE ORAL
Status: DISCONTINUED | OUTPATIENT
Start: 2023-01-01 | End: 2023-01-01 | Stop reason: HOSPADM

## 2023-01-01 RX ORDER — GLYCOPYRROLATE 0.2 MG/ML
0.4 INJECTION INTRAMUSCULAR; INTRAVENOUS
Status: DISCONTINUED | OUTPATIENT
Start: 2023-01-01 | End: 2023-01-01 | Stop reason: HOSPADM

## 2023-01-01 RX ORDER — ACETAMINOPHEN 325 MG/1
650 TABLET ORAL EVERY 4 HOURS PRN
Status: DISCONTINUED | OUTPATIENT
Start: 2023-01-01 | End: 2023-01-01

## 2023-01-01 RX ORDER — BISACODYL 10 MG
10 SUPPOSITORY, RECTAL RECTAL DAILY PRN
Status: DISCONTINUED | OUTPATIENT
Start: 2023-01-01 | End: 2023-01-01

## 2023-01-01 RX ORDER — BUMETANIDE 0.25 MG/ML
4 INJECTION INTRAMUSCULAR; INTRAVENOUS EVERY 8 HOURS
Status: DISPENSED | OUTPATIENT
Start: 2023-01-01 | End: 2023-01-01

## 2023-01-01 RX ORDER — HYDROCODONE BITARTRATE AND ACETAMINOPHEN 5; 325 MG/1; MG/1
1 TABLET ORAL EVERY 6 HOURS PRN
Status: DISCONTINUED | OUTPATIENT
Start: 2023-01-01 | End: 2023-01-01

## 2023-01-01 RX ORDER — MORPHINE SULFATE 20 MG/ML
10 SOLUTION ORAL
Status: DISCONTINUED | OUTPATIENT
Start: 2023-01-01 | End: 2023-01-01 | Stop reason: HOSPADM

## 2023-01-01 RX ORDER — AMIODARONE HYDROCHLORIDE 200 MG/1
200 TABLET ORAL 2 TIMES DAILY WITH MEALS
Status: DISCONTINUED | OUTPATIENT
Start: 2023-01-01 | End: 2023-01-01

## 2023-01-01 RX ORDER — NOREPINEPHRINE BITARTRATE 0.03 MG/ML
.02-.3 INJECTION, SOLUTION INTRAVENOUS
Status: DISCONTINUED | OUTPATIENT
Start: 2023-01-01 | End: 2023-01-01

## 2023-01-01 RX ORDER — MORPHINE SULFATE 2 MG/ML
2 INJECTION, SOLUTION INTRAMUSCULAR; INTRAVENOUS
Status: DISCONTINUED | OUTPATIENT
Start: 2023-01-01 | End: 2023-01-01 | Stop reason: HOSPADM

## 2023-01-01 RX ORDER — PANTOPRAZOLE SODIUM 40 MG/1
40 TABLET, DELAYED RELEASE ORAL
Status: DISCONTINUED | OUTPATIENT
Start: 2023-01-01 | End: 2023-01-01 | Stop reason: HOSPADM

## 2023-01-01 RX ORDER — SODIUM CHLORIDE 9 MG/ML
75 INJECTION, SOLUTION INTRAVENOUS CONTINUOUS
Status: DISCONTINUED | OUTPATIENT
Start: 2023-01-01 | End: 2023-01-01

## 2023-01-01 RX ORDER — FUROSEMIDE 10 MG/ML
40 INJECTION INTRAMUSCULAR; INTRAVENOUS EVERY 12 HOURS
Status: DISCONTINUED | OUTPATIENT
Start: 2023-01-01 | End: 2023-01-01

## 2023-01-01 RX ORDER — DIPHENHYDRAMINE HCL 25 MG
25 CAPSULE ORAL EVERY 6 HOURS PRN
Status: DISCONTINUED | OUTPATIENT
Start: 2023-01-01 | End: 2023-01-01 | Stop reason: HOSPADM

## 2023-01-01 RX ORDER — ACETAMINOPHEN 650 MG/1
650 SUPPOSITORY RECTAL EVERY 4 HOURS PRN
Status: DISCONTINUED | OUTPATIENT
Start: 2023-01-01 | End: 2023-01-01 | Stop reason: HOSPADM

## 2023-01-01 RX ORDER — BISACODYL 10 MG
10 SUPPOSITORY, RECTAL RECTAL DAILY PRN
Status: DISCONTINUED | OUTPATIENT
Start: 2023-01-01 | End: 2023-01-01 | Stop reason: SDUPTHER

## 2023-01-01 RX ORDER — LORAZEPAM 2 MG/ML
2 CONCENTRATE ORAL
Status: DISCONTINUED | OUTPATIENT
Start: 2023-01-01 | End: 2023-01-01 | Stop reason: HOSPADM

## 2023-01-01 RX ORDER — MORPHINE SULFATE 20 MG/ML
5 SOLUTION ORAL
Status: DISCONTINUED | OUTPATIENT
Start: 2023-01-01 | End: 2023-01-01 | Stop reason: HOSPADM

## 2023-01-01 RX ORDER — LISINOPRIL 5 MG/1
5 TABLET ORAL DAILY
Status: DISCONTINUED | OUTPATIENT
Start: 2023-01-01 | End: 2023-01-01

## 2023-01-01 RX ORDER — PROMETHAZINE HYDROCHLORIDE 12.5 MG/1
12.5 SUPPOSITORY RECTAL EVERY 6 HOURS PRN
Status: DISCONTINUED | OUTPATIENT
Start: 2023-01-01 | End: 2023-01-01

## 2023-01-01 RX ORDER — IBUPROFEN 600 MG/1
1 TABLET ORAL
Status: DISCONTINUED | OUTPATIENT
Start: 2023-01-01 | End: 2023-01-01

## 2023-01-01 RX ORDER — ASPIRIN 81 MG/1
81 TABLET, CHEWABLE ORAL DAILY
Status: DISCONTINUED | OUTPATIENT
Start: 2023-01-01 | End: 2023-01-01

## 2023-01-01 RX ORDER — ONDANSETRON 2 MG/ML
4 INJECTION INTRAMUSCULAR; INTRAVENOUS EVERY 4 HOURS PRN
Status: DISCONTINUED | OUTPATIENT
Start: 2023-01-01 | End: 2023-01-01

## 2023-01-01 RX ADMIN — SENNOSIDES AND DOCUSATE SODIUM 2 TABLET: 50; 8.6 TABLET ORAL at 20:32

## 2023-01-01 RX ADMIN — ISOSORBIDE MONONITRATE 60 MG: 60 TABLET, EXTENDED RELEASE ORAL at 08:15

## 2023-01-01 RX ADMIN — AMIODARONE HYDROCHLORIDE 200 MG: 200 TABLET ORAL at 18:00

## 2023-01-01 RX ADMIN — HYDROCODONE BITARTRATE AND ACETAMINOPHEN 1 TABLET: 5; 325 TABLET ORAL at 18:00

## 2023-01-01 RX ADMIN — ROSUVASTATIN CALCIUM 10 MG: 10 TABLET, FILM COATED ORAL at 08:26

## 2023-01-01 RX ADMIN — TORSEMIDE 40 MG: 20 TABLET ORAL at 11:48

## 2023-01-01 RX ADMIN — ASPIRIN 81 MG: 81 TABLET, CHEWABLE ORAL at 09:08

## 2023-01-01 RX ADMIN — FUROSEMIDE 40 MG: 10 INJECTION, SOLUTION INTRAMUSCULAR; INTRAVENOUS at 09:08

## 2023-01-01 RX ADMIN — ROSUVASTATIN CALCIUM 10 MG: 10 TABLET, FILM COATED ORAL at 08:15

## 2023-01-01 RX ADMIN — HYDROMORPHONE HYDROCHLORIDE 0.5 MG: 1 INJECTION, SOLUTION INTRAMUSCULAR; INTRAVENOUS; SUBCUTANEOUS at 09:31

## 2023-01-01 RX ADMIN — SENNOSIDES AND DOCUSATE SODIUM 2 TABLET: 50; 8.6 TABLET ORAL at 08:12

## 2023-01-01 RX ADMIN — PRASUGREL 5 MG: 10 TABLET, FILM COATED ORAL at 09:10

## 2023-01-01 RX ADMIN — IPRATROPIUM BROMIDE AND ALBUTEROL SULFATE 3 ML: .5; 2.5 SOLUTION RESPIRATORY (INHALATION) at 12:06

## 2023-01-01 RX ADMIN — HYDROCODONE BITARTRATE AND ACETAMINOPHEN 1 TABLET: 5; 325 TABLET ORAL at 02:29

## 2023-01-01 RX ADMIN — MORPHINE SULFATE 2 MG: 2 INJECTION, SOLUTION INTRAMUSCULAR; INTRAVENOUS at 05:39

## 2023-01-01 RX ADMIN — ACETAMINOPHEN 650 MG: 325 TABLET, FILM COATED ORAL at 22:29

## 2023-01-01 RX ADMIN — HYDROMORPHONE HYDROCHLORIDE 1 MG: 1 INJECTION, SOLUTION INTRAMUSCULAR; INTRAVENOUS; SUBCUTANEOUS at 00:58

## 2023-01-01 RX ADMIN — LISINOPRIL 5 MG: 5 TABLET ORAL at 08:59

## 2023-01-01 RX ADMIN — PANTOPRAZOLE SODIUM 40 MG: 40 INJECTION, POWDER, FOR SOLUTION INTRAVENOUS at 05:39

## 2023-01-01 RX ADMIN — ONDANSETRON 4 MG: 2 INJECTION INTRAMUSCULAR; INTRAVENOUS at 12:54

## 2023-01-01 RX ADMIN — AMIODARONE HYDROCHLORIDE 200 MG: 200 TABLET ORAL at 08:13

## 2023-01-01 RX ADMIN — MORPHINE SULFATE 4 MG: 2 INJECTION, SOLUTION INTRAMUSCULAR; INTRAVENOUS at 08:43

## 2023-01-01 RX ADMIN — Medication 0.1 MCG/KG/MIN: at 12:05

## 2023-01-01 RX ADMIN — FUROSEMIDE 40 MG: 10 INJECTION, SOLUTION INTRAMUSCULAR; INTRAVENOUS at 09:09

## 2023-01-01 RX ADMIN — ONDANSETRON 4 MG: 2 INJECTION INTRAMUSCULAR; INTRAVENOUS at 07:51

## 2023-01-01 RX ADMIN — METOPROLOL TARTRATE 25 MG: 25 TABLET, FILM COATED ORAL at 21:41

## 2023-01-01 RX ADMIN — ONDANSETRON 4 MG: 2 INJECTION INTRAMUSCULAR; INTRAVENOUS at 05:39

## 2023-01-01 RX ADMIN — MORPHINE SULFATE 2 MG: 2 INJECTION, SOLUTION INTRAMUSCULAR; INTRAVENOUS at 20:36

## 2023-01-01 RX ADMIN — ONDANSETRON 4 MG: 2 INJECTION INTRAMUSCULAR; INTRAVENOUS at 08:27

## 2023-01-01 RX ADMIN — PANTOPRAZOLE SODIUM 40 MG: 40 TABLET, DELAYED RELEASE ORAL at 08:15

## 2023-01-01 RX ADMIN — DIPHENHYDRAMINE HYDROCHLORIDE 25 MG: 25 SOLUTION ORAL at 03:57

## 2023-01-01 RX ADMIN — FENTANYL CITRATE 12.5 MCG: 50 INJECTION, SOLUTION INTRAMUSCULAR; INTRAVENOUS at 09:37

## 2023-01-01 RX ADMIN — METOPROLOL TARTRATE 25 MG: 25 TABLET, FILM COATED ORAL at 20:48

## 2023-01-01 RX ADMIN — ZOLPIDEM TARTRATE 5 MG: 5 TABLET ORAL at 22:30

## 2023-01-01 RX ADMIN — SCOPALAMINE 1 PATCH: 1 PATCH, EXTENDED RELEASE TRANSDERMAL at 22:11

## 2023-01-01 RX ADMIN — ONDANSETRON 4 MG: 2 INJECTION INTRAMUSCULAR; INTRAVENOUS at 16:09

## 2023-01-01 RX ADMIN — DIPHENHYDRAMINE HYDROCHLORIDE 25 MG: 25 CAPSULE ORAL at 17:45

## 2023-01-01 RX ADMIN — HYDROMORPHONE HYDROCHLORIDE 0.5 MG: 1 INJECTION, SOLUTION INTRAMUSCULAR; INTRAVENOUS; SUBCUTANEOUS at 12:54

## 2023-01-01 RX ADMIN — PROMETHAZINE HYDROCHLORIDE 12.5 MG: 12.5 TABLET ORAL at 10:50

## 2023-01-01 RX ADMIN — AMIODARONE HYDROCHLORIDE 200 MG: 200 TABLET ORAL at 15:36

## 2023-01-01 RX ADMIN — HYDROMORPHONE HYDROCHLORIDE 0.5 MG: 1 INJECTION, SOLUTION INTRAMUSCULAR; INTRAVENOUS; SUBCUTANEOUS at 12:08

## 2023-01-01 RX ADMIN — DIPHENHYDRAMINE HYDROCHLORIDE 25 MG: 25 CAPSULE ORAL at 14:35

## 2023-01-01 RX ADMIN — DIPHENHYDRAMINE HYDROCHLORIDE 25 MG: 25 SOLUTION ORAL at 09:32

## 2023-01-01 RX ADMIN — SENNOSIDES AND DOCUSATE SODIUM 2 TABLET: 50; 8.6 TABLET ORAL at 08:43

## 2023-01-01 RX ADMIN — FENTANYL CITRATE 12.5 MCG: 50 INJECTION, SOLUTION INTRAMUSCULAR; INTRAVENOUS at 05:08

## 2023-01-01 RX ADMIN — ACETAMINOPHEN 650 MG: 325 TABLET, FILM COATED ORAL at 22:54

## 2023-01-01 RX ADMIN — ONDANSETRON 4 MG: 2 INJECTION INTRAMUSCULAR; INTRAVENOUS at 20:08

## 2023-01-01 RX ADMIN — ZOLPIDEM TARTRATE 5 MG: 5 TABLET ORAL at 22:29

## 2023-01-01 RX ADMIN — DOBUTAMINE IN DEXTROSE 5 MCG/KG/MIN: 100 INJECTION, SOLUTION INTRAVENOUS at 05:09

## 2023-01-01 RX ADMIN — DIPHENHYDRAMINE HYDROCHLORIDE 25 MG: 50 INJECTION, SOLUTION INTRAMUSCULAR; INTRAVENOUS at 18:00

## 2023-01-01 RX ADMIN — DIPHENHYDRAMINE HYDROCHLORIDE 25 MG: 25 SOLUTION ORAL at 22:00

## 2023-01-01 RX ADMIN — PANTOPRAZOLE SODIUM 40 MG: 40 TABLET, DELAYED RELEASE ORAL at 08:26

## 2023-01-01 RX ADMIN — ROSUVASTATIN CALCIUM 10 MG: 10 TABLET, FILM COATED ORAL at 08:59

## 2023-01-01 RX ADMIN — DOBUTAMINE IN DEXTROSE 5 MCG/KG/MIN: 100 INJECTION, SOLUTION INTRAVENOUS at 16:09

## 2023-01-01 RX ADMIN — MORPHINE SULFATE 2 MG: 2 INJECTION, SOLUTION INTRAMUSCULAR; INTRAVENOUS at 07:41

## 2023-01-01 RX ADMIN — FENTANYL CITRATE 12.5 MCG: 50 INJECTION, SOLUTION INTRAMUSCULAR; INTRAVENOUS at 05:51

## 2023-01-01 RX ADMIN — DIPHENHYDRAMINE HYDROCHLORIDE 25 MG: 25 CAPSULE ORAL at 08:48

## 2023-01-01 RX ADMIN — HYDROMORPHONE HYDROCHLORIDE 1 MG: 1 INJECTION, SOLUTION INTRAMUSCULAR; INTRAVENOUS; SUBCUTANEOUS at 10:58

## 2023-01-01 RX ADMIN — ROSUVASTATIN CALCIUM 10 MG: 10 TABLET, FILM COATED ORAL at 08:25

## 2023-01-01 RX ADMIN — METOPROLOL TARTRATE 25 MG: 25 TABLET, FILM COATED ORAL at 08:15

## 2023-01-01 RX ADMIN — DIPHENHYDRAMINE HYDROCHLORIDE 25 MG: 25 SOLUTION ORAL at 15:00

## 2023-01-01 RX ADMIN — ZOLPIDEM TARTRATE 5 MG: 5 TABLET ORAL at 22:11

## 2023-01-01 RX ADMIN — HYDROCODONE BITARTRATE AND ACETAMINOPHEN 1 TABLET: 5; 325 TABLET ORAL at 14:17

## 2023-01-01 RX ADMIN — FUROSEMIDE 40 MG: 10 INJECTION, SOLUTION INTRAMUSCULAR; INTRAVENOUS at 20:48

## 2023-01-01 RX ADMIN — ONDANSETRON 4 MG: 2 INJECTION INTRAMUSCULAR; INTRAVENOUS at 17:15

## 2023-01-01 RX ADMIN — ZOLPIDEM TARTRATE 5 MG: 5 TABLET ORAL at 22:01

## 2023-01-01 RX ADMIN — PANTOPRAZOLE SODIUM 40 MG: 40 TABLET, DELAYED RELEASE ORAL at 08:25

## 2023-01-01 RX ADMIN — Medication 0.02 MCG/KG/MIN: at 17:43

## 2023-01-01 RX ADMIN — Medication 0.09 MCG/KG/MIN: at 21:53

## 2023-01-01 RX ADMIN — BUMETANIDE 4 MG: 0.25 INJECTION INTRAMUSCULAR; INTRAVENOUS at 04:04

## 2023-01-01 RX ADMIN — METOPROLOL TARTRATE 25 MG: 25 TABLET, FILM COATED ORAL at 09:16

## 2023-01-01 RX ADMIN — ASPIRIN 81 MG: 81 TABLET, CHEWABLE ORAL at 08:27

## 2023-01-01 RX ADMIN — ONDANSETRON 4 MG: 2 INJECTION INTRAMUSCULAR; INTRAVENOUS at 21:20

## 2023-01-01 RX ADMIN — DIPHENHYDRAMINE HYDROCHLORIDE 25 MG: 25 SOLUTION ORAL at 04:12

## 2023-01-01 RX ADMIN — LISINOPRIL 5 MG: 5 TABLET ORAL at 08:15

## 2023-01-01 RX ADMIN — DIPHENHYDRAMINE HYDROCHLORIDE 25 MG: 25 CAPSULE ORAL at 00:57

## 2023-01-01 RX ADMIN — FENTANYL CITRATE 12.5 MCG: 50 INJECTION, SOLUTION INTRAMUSCULAR; INTRAVENOUS at 01:04

## 2023-01-01 RX ADMIN — PRASUGREL 5 MG: 10 TABLET, FILM COATED ORAL at 08:13

## 2023-01-01 RX ADMIN — HYDROMORPHONE HYDROCHLORIDE 0.5 MG: 1 INJECTION, SOLUTION INTRAMUSCULAR; INTRAVENOUS; SUBCUTANEOUS at 23:32

## 2023-01-01 RX ADMIN — HYDROMORPHONE HYDROCHLORIDE 1 MG: 1 INJECTION, SOLUTION INTRAMUSCULAR; INTRAVENOUS; SUBCUTANEOUS at 16:57

## 2023-01-01 RX ADMIN — ZOLPIDEM TARTRATE 5 MG: 5 TABLET ORAL at 01:40

## 2023-01-01 RX ADMIN — PRASUGREL 5 MG: 10 TABLET, FILM COATED ORAL at 08:25

## 2023-01-01 RX ADMIN — LORAZEPAM 0.5 MG: 2 INJECTION INTRAMUSCULAR; INTRAVENOUS at 21:28

## 2023-01-01 RX ADMIN — DOBUTAMINE IN DEXTROSE 5 MCG/KG/MIN: 100 INJECTION, SOLUTION INTRAVENOUS at 09:41

## 2023-01-01 RX ADMIN — FENTANYL CITRATE 12.5 MCG: 50 INJECTION, SOLUTION INTRAMUSCULAR; INTRAVENOUS at 18:18

## 2023-01-01 RX ADMIN — AMIODARONE HYDROCHLORIDE 200 MG: 200 TABLET ORAL at 08:26

## 2023-01-01 RX ADMIN — BUMETANIDE 2 MG: 0.25 INJECTION INTRAMUSCULAR; INTRAVENOUS at 14:57

## 2023-01-01 RX ADMIN — PRASUGREL 5 MG: 10 TABLET, FILM COATED ORAL at 08:15

## 2023-01-01 RX ADMIN — LORAZEPAM 1 MG: 2 INJECTION INTRAMUSCULAR; INTRAVENOUS at 00:59

## 2023-01-01 RX ADMIN — ZOLPIDEM TARTRATE 5 MG: 5 TABLET ORAL at 01:27

## 2023-01-01 RX ADMIN — HYDROCODONE BITARTRATE AND ACETAMINOPHEN 1 TABLET: 5; 325 TABLET ORAL at 20:31

## 2023-01-01 RX ADMIN — HYDROMORPHONE HYDROCHLORIDE 1 MG: 1 INJECTION, SOLUTION INTRAMUSCULAR; INTRAVENOUS; SUBCUTANEOUS at 21:27

## 2023-01-01 RX ADMIN — ASPIRIN 81 MG: 81 TABLET, CHEWABLE ORAL at 08:25

## 2023-01-01 RX ADMIN — PROCHLORPERAZINE EDISYLATE 5 MG: 5 INJECTION INTRAMUSCULAR; INTRAVENOUS at 22:11

## 2023-01-01 RX ADMIN — Medication 10 ML: at 08:28

## 2023-01-01 RX ADMIN — ONDANSETRON 4 MG: 2 INJECTION INTRAMUSCULAR; INTRAVENOUS at 03:56

## 2023-01-01 RX ADMIN — PANTOPRAZOLE SODIUM 40 MG: 40 TABLET, DELAYED RELEASE ORAL at 09:16

## 2023-01-01 RX ADMIN — FENTANYL CITRATE 12.5 MCG: 50 INJECTION, SOLUTION INTRAMUSCULAR; INTRAVENOUS at 14:27

## 2023-01-01 RX ADMIN — HYDROCODONE BITARTRATE AND ACETAMINOPHEN 1 TABLET: 5; 325 TABLET ORAL at 06:42

## 2023-01-01 RX ADMIN — SENNOSIDES AND DOCUSATE SODIUM 2 TABLET: 50; 8.6 TABLET ORAL at 08:26

## 2023-01-01 RX ADMIN — SENNOSIDES AND DOCUSATE SODIUM 2 TABLET: 50; 8.6 TABLET ORAL at 11:37

## 2023-01-01 RX ADMIN — Medication 0.2 MCG/KG/MIN: at 14:03

## 2023-01-01 RX ADMIN — FENTANYL CITRATE 12.5 MCG: 50 INJECTION, SOLUTION INTRAMUSCULAR; INTRAVENOUS at 22:22

## 2023-01-01 RX ADMIN — PANTOPRAZOLE SODIUM 40 MG: 40 TABLET, DELAYED RELEASE ORAL at 08:13

## 2023-01-01 RX ADMIN — HYDROMORPHONE HYDROCHLORIDE 0.5 MG: 1 INJECTION, SOLUTION INTRAMUSCULAR; INTRAVENOUS; SUBCUTANEOUS at 16:09

## 2023-01-01 RX ADMIN — MORPHINE SULFATE 2 MG: 2 INJECTION, SOLUTION INTRAMUSCULAR; INTRAVENOUS at 00:57

## 2023-01-01 RX ADMIN — METOPROLOL TARTRATE 5 MG: 1 INJECTION, SOLUTION INTRAVENOUS at 09:20

## 2023-01-01 RX ADMIN — DIPHENHYDRAMINE HYDROCHLORIDE 25 MG: 50 INJECTION, SOLUTION INTRAMUSCULAR; INTRAVENOUS at 14:07

## 2023-01-01 RX ADMIN — SENNOSIDES AND DOCUSATE SODIUM 2 TABLET: 50; 8.6 TABLET ORAL at 20:48

## 2023-01-01 RX ADMIN — FUROSEMIDE 40 MG: 10 INJECTION, SOLUTION INTRAMUSCULAR; INTRAVENOUS at 21:41

## 2023-01-01 RX ADMIN — ISOSORBIDE MONONITRATE 60 MG: 60 TABLET, EXTENDED RELEASE ORAL at 08:59

## 2023-01-01 RX ADMIN — HYDROCODONE BITARTRATE AND ACETAMINOPHEN 1 TABLET: 5; 325 TABLET ORAL at 11:37

## 2023-01-01 RX ADMIN — FENTANYL CITRATE 12.5 MCG: 50 INJECTION, SOLUTION INTRAMUSCULAR; INTRAVENOUS at 04:14

## 2023-01-01 RX ADMIN — DIPHENHYDRAMINE HYDROCHLORIDE 25 MG: 50 INJECTION, SOLUTION INTRAMUSCULAR; INTRAVENOUS at 21:09

## 2023-01-01 RX ADMIN — FENTANYL CITRATE 12.5 MCG: 50 INJECTION, SOLUTION INTRAMUSCULAR; INTRAVENOUS at 21:46

## 2023-01-01 RX ADMIN — LORAZEPAM 0.5 MG: 2 INJECTION INTRAMUSCULAR; INTRAVENOUS at 16:57

## 2023-01-01 RX ADMIN — Medication 0.06 MCG/KG/MIN: at 16:53

## 2023-01-01 RX ADMIN — ONDANSETRON 4 MG: 2 INJECTION INTRAMUSCULAR; INTRAVENOUS at 18:02

## 2023-01-01 RX ADMIN — SENNOSIDES AND DOCUSATE SODIUM 2 TABLET: 50; 8.6 TABLET ORAL at 22:01

## 2023-01-01 RX ADMIN — Medication 3 MG: at 22:53

## 2023-01-01 RX ADMIN — PERFLUTREN 3 ML: 6.52 INJECTION, SUSPENSION INTRAVENOUS at 10:49

## 2023-01-01 RX ADMIN — GLYCOPYRROLATE 0.2 MG: 0.2 INJECTION INTRAMUSCULAR; INTRAVENOUS at 21:28

## 2023-01-01 RX ADMIN — GLYCOPYRROLATE 0.2 MG: 0.2 INJECTION INTRAMUSCULAR; INTRAVENOUS at 00:59

## 2023-01-01 RX ADMIN — FENTANYL CITRATE 12.5 MCG: 50 INJECTION, SOLUTION INTRAMUSCULAR; INTRAVENOUS at 20:50

## 2023-01-01 RX ADMIN — ASPIRIN 81 MG: 81 TABLET, CHEWABLE ORAL at 08:15

## 2023-01-01 RX ADMIN — HYDROCODONE BITARTRATE AND ACETAMINOPHEN 1 TABLET: 5; 325 TABLET ORAL at 00:21

## 2023-01-01 RX ADMIN — ASPIRIN 81 MG: 81 TABLET, CHEWABLE ORAL at 08:13

## 2023-01-01 RX ADMIN — ONDANSETRON 4 MG: 2 INJECTION INTRAMUSCULAR; INTRAVENOUS at 19:18

## 2023-01-01 RX ADMIN — HYDROMORPHONE HYDROCHLORIDE 1 MG: 1 INJECTION, SOLUTION INTRAMUSCULAR; INTRAVENOUS; SUBCUTANEOUS at 14:35

## 2023-01-01 RX ADMIN — FENTANYL CITRATE 12.5 MCG: 50 INJECTION, SOLUTION INTRAMUSCULAR; INTRAVENOUS at 15:16

## 2023-01-01 RX ADMIN — HYDROMORPHONE HYDROCHLORIDE 1 MG: 1 INJECTION, SOLUTION INTRAMUSCULAR; INTRAVENOUS; SUBCUTANEOUS at 16:59

## 2023-01-01 RX ADMIN — BUMETANIDE 2 MG: 0.25 INJECTION INTRAMUSCULAR; INTRAVENOUS at 10:40

## 2023-01-01 RX ADMIN — PRASUGREL 5 MG: 10 TABLET, FILM COATED ORAL at 08:27

## 2023-01-01 RX ADMIN — ROSUVASTATIN CALCIUM 10 MG: 10 TABLET, FILM COATED ORAL at 08:13

## 2023-01-24 DIAGNOSIS — G47.00 INSOMNIA: ICD-10-CM

## 2023-01-25 RX ORDER — ZOLPIDEM TARTRATE 10 MG/1
10 TABLET ORAL NIGHTLY PRN
Qty: 30 TABLET | Refills: 0 | Status: SHIPPED | OUTPATIENT
Start: 2023-01-25 | End: 2023-02-20

## 2023-01-25 NOTE — TELEPHONE ENCOUNTER
Last office visit with prescribing clinician: 10/11/2022     Next office visit with prescribing clinician: 4/12/2023   {

## 2023-02-12 DIAGNOSIS — K21.9 GASTROESOPHAGEAL REFLUX DISEASE WITHOUT ESOPHAGITIS: ICD-10-CM

## 2023-02-13 RX ORDER — PANTOPRAZOLE SODIUM 40 MG/1
40 TABLET, DELAYED RELEASE ORAL DAILY
Qty: 90 TABLET | Refills: 0 | Status: SHIPPED | OUTPATIENT
Start: 2023-02-13

## 2023-02-13 RX ORDER — POTASSIUM CHLORIDE 750 MG/1
10 TABLET, EXTENDED RELEASE ORAL DAILY
Qty: 90 TABLET | Refills: 1 | Status: SHIPPED | OUTPATIENT
Start: 2023-02-13

## 2023-02-20 DIAGNOSIS — G47.00 INSOMNIA: ICD-10-CM

## 2023-02-20 RX ORDER — ZOLPIDEM TARTRATE 10 MG/1
10 TABLET ORAL NIGHTLY PRN
Qty: 30 TABLET | Refills: 0 | Status: SHIPPED | OUTPATIENT
Start: 2023-02-20 | End: 2023-03-23

## 2023-02-22 ENCOUNTER — APPOINTMENT (OUTPATIENT)
Dept: GENERAL RADIOLOGY | Facility: HOSPITAL | Age: 88
End: 2023-02-22
Payer: MEDICARE

## 2023-02-22 ENCOUNTER — HOSPITAL ENCOUNTER (EMERGENCY)
Facility: HOSPITAL | Age: 88
Discharge: HOME | End: 2023-02-22
Attending: EMERGENCY MEDICINE | Admitting: EMERGENCY MEDICINE
Payer: MEDICARE

## 2023-02-22 ENCOUNTER — APPOINTMENT (OUTPATIENT)
Dept: CT IMAGING | Facility: HOSPITAL | Age: 88
End: 2023-02-22
Payer: MEDICARE

## 2023-02-22 VITALS
WEIGHT: 97 LBS | BODY MASS INDEX: 19.04 KG/M2 | HEIGHT: 60 IN | TEMPERATURE: 97.3 F | RESPIRATION RATE: 18 BRPM | SYSTOLIC BLOOD PRESSURE: 155 MMHG | OXYGEN SATURATION: 98 % | HEART RATE: 75 BPM | DIASTOLIC BLOOD PRESSURE: 82 MMHG

## 2023-02-22 DIAGNOSIS — M25.461 EFFUSION, RIGHT KNEE: ICD-10-CM
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
## 2023-02-22 DIAGNOSIS — R93.6 ABNORMAL X-RAY OF KNEE: ICD-10-CM

## 2023-02-22 DIAGNOSIS — S80.01XA CONTUSION OF RIGHT KNEE, INITIAL ENCOUNTER: Primary | ICD-10-CM

## 2023-02-22 PROCEDURE — 73700 CT LOWER EXTREMITY W/O DYE: CPT

## 2023-02-22 PROCEDURE — 99283 EMERGENCY DEPT VISIT LOW MDM: CPT

## 2023-02-22 PROCEDURE — 73562 X-RAY EXAM OF KNEE 3: CPT

## 2023-02-22 RX ORDER — ACETAMINOPHEN 325 MG/1
650 TABLET ORAL ONCE
Status: COMPLETED | OUTPATIENT
Start: 2023-02-22 | End: 2023-02-22

## 2023-02-22 RX ADMIN — ACETAMINOPHEN 650 MG: 325 TABLET, FILM COATED ORAL at 14:06

## 2023-02-22 NOTE — ED PROVIDER NOTES
EMERGENCY DEPARTMENT ENCOUNTER    Room Number:  S04/04  Date seen:  2/22/2023  PCP: Pam Charles APRN  Historian: self      HPI:  Chief Complaint: knee pain  A complete HPI/ROS/PMH/PSH/SH/FH are unobtainable due to: n/a  Context: Marilynn Gil is a 87 y.o. female who presents to the ED c/o mechanical fall that occurred last night.  She states that she landed on her right knee.  She is having right, medial knee pain.  No numbness or tingling.  She denies hitting her head, LOC, neck or back pain.  No preceding chest pain, shortness of breath, syncope.  She is not on any anticoagulation.  No other injuries at this time.             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 ([facility]) 11/14/2022   • Type 2 diabetes mellitus with hyperglycemia ([facility]) 11/14/2022   • Mixed hyperlipidemia 11/14/2022   • Essential hypertension, benign 11/14/2022   • CRI (chronic renal insufficiency), stage 4 (severe) ([facility]) 11/14/2022   • Personal history of other venous thrombosis and embolism 06/23/2022     Resolved Ambulatory Problems     Diagnosis Date Noted   • Chronic recurrent pancreatitis ([facility])    • Hypertension    • Hypercholesterolemia    • CAD in native artery    • CKD (chronic kidney disease) stage 4, GFR 15-29 ml/min ([facility])    • Impaired fasting glucose 10/11/2022   • Medicare annual wellness visit, subsequent 10/11/2022     Past Medical History:   Diagnosis Date   • Anxiety    • CAD (coronary artery disease) 1985   • CVA (cerebral vascular accident) ([facility])    • GERD  (gastroesophageal reflux disease)    • Impaired glucose metabolism    • Mesenteric ischemia, chronic ([facility])    • Neuropathy    • Osteoporosis    • Pancreatitis    • Venous thrombosis and embolism          PAST SURGICAL HISTORY  Past Surgical History:   Procedure Laterality Date   • BACK SURGERY      x 4   • BREAST MASS EXCISION      Benign   • CATARACT EXTRACTION Bilateral    • CHOLECYSTECTOMY  2013   • COLONOSCOPY  2012    scarred mucosa,internal hemorrhoids   • COLONOSCOPY  2012    Patent side to side ileo colonic anastomosis, one 7mm polyp in the ascending colon, erythematous and vascular pattern decreased mucosa in the transverse colon, scarred mucosa in the rectum, NBIH.  PATH: Tubular adenoma, Patchy mild chronic inflammation    • CORONARY ANGIOPLASTY      MI , s/p angioplasty    • ENDOSCOPY  2013    z line irreg 38cm from the incisors, tortuous esophagus, HH, gastritis, bilious gastric fluid, duodenitis.  PATH: Gastric mucosa with minimal chornic infalmmation and with histologic features suggestive of reactive gastropathy.    • PANCREAS SURGERY  2013    Pancreatic duct stricture, stent placed   • RECTAL PROLAPSE REPAIR      Rectopexy and sigmoidectomy, s/p rectal prolapse repair x 2 prior to    • SPHINCTEROTOMY  2013         FAMILY HISTORY  Family History   Problem Relation Age of Onset   • Cancer Brother    • Pancreatitis Paternal Aunt    • Diabetes Father          SOCIAL HISTORY  Social History     Socioeconomic History   • Marital status:    • Number of children: 2   Tobacco Use   • Smoking status: Former     Packs/day: 2.00     Types: Cigarettes     Quit date:      Years since quittin.1   • Smokeless tobacco: Never   Substance and Sexual Activity   • Alcohol use: No   • Drug use: No   • Sexual activity: Defer         ALLERGIES  Codeine, Gabapentin, Lyrica [pregabalin], Other, Shellfish-derived products, and Sulfa antibiotics        REVIEW OF  SYSTEMS  Per HPI, otherwise negative.       PHYSICAL EXAM  ED Triage Vitals   Temp Heart Rate Resp BP SpO2   02/22/23 1258 02/22/23 1257 02/22/23 1258 02/22/23 1304 02/22/23 1257   97.3 °F (36.3 °C) 85 20 160/85 (!) 89 %      Temp src Heart Rate Source Patient Position BP Location FiO2 (%)   02/22/23 1258 02/22/23 1257 02/22/23 1304 02/22/23 1304 --   Tympanic Monitor Sitting Left arm        Physical Exam  Vitals and nursing note reviewed.   Constitutional:       Appearance: Normal appearance.      Comments: Frail     HENT:      Head: Normocephalic and atraumatic.   Cardiovascular:      Rate and Rhythm: Normal rate and regular rhythm.      Pulses: Normal pulses.      Heart sounds: Normal heart sounds.   Pulmonary:      Effort: Pulmonary effort is normal.      Breath sounds: Normal breath sounds.   Abdominal:      General: Bowel sounds are normal.      Palpations: Abdomen is soft.      Tenderness: There is no abdominal tenderness. There is no right CVA tenderness or guarding.   Musculoskeletal:         General: Tenderness (right knee) present. Normal range of motion.      Cervical back: Normal range of motion.      Comments: No cervical, thoracic, lumbar spine tenderness.    Right, anterior/medial knee tenderness. No swelling or deformity. FROM. Distal pulses palpable.    Pelvis stable   Skin:     General: Skin is warm.      Capillary Refill: Capillary refill takes less than 2 seconds.   Neurological:      Mental Status: She is alert and oriented to person, place, and time. Mental status is at baseline.   Psychiatric:         Mood and Affect: Mood normal.               LAB RESULTS  No results found for this or any previous visit (from the past 24 hour(s)).    Ordered the above labs and reviewed the results.        RADIOLOGY  XR Knee 3 View Right    Result Date: 2/22/2023  XR KNEE 3 VW RIGHT-  INDICATIONS: Trauma  TECHNIQUE: 3 views of the right knee    COMPARISON: None available  FINDINGS:  Minimal knee effusion is  apparent. The bones are diffusely osteopenic. A linear lucency projecting over the proximal tibial shaft on the frontal view may be result of overlying soft tissue shadows, with no cortical disruption identified. However, possibility of nondisplaced fracture is not excluded if there is pain at this location; if indicated, cross-sectional imaging could be obtained to exclude the possibility of a nondisplaced fracture. No other evidence of fracture is identified. Arterial calcifications are present.        As described.  This report was finalized on 2/22/2023 2:12 PM by Dr. Amilcar Marquez M.D.      CT Lower Extremity Right Without Contrast    Result Date: 2/22/2023  CT RIGHT KNEE WITHOUT CONTRAST  HISTORY: Right knee trauma. Fall with knee pain.  TECHNIQUE: CT includes axial imaging through the right knee and this data was reconstructed in coronal and sagittal planes.  COMPARISON: Right knee x-rays 02/22/2023.  FINDINGS: Evaluation for fracture is limited by the degree of osteopenia. However, no fracture or acute osseous abnormality is evident. There is chondrocalcinosis at the medial and lateral compartments. Small effusion is present. Proximal tibiofibular joint appears within normal limits.      1. Osteopenia. Small effusion. No evidence for fracture or acute osseous abnormality. 2. Chondrocalcinosis. 3. Atherosclerotic disease.  Radiation dose reduction techniques were utilized, including automated exposure control and exposure modulation based on body size.  This report was finalized on 2/22/2023 4:19 PM by Dr. Anthony Mcdonough M.D.        Ordered the above noted radiological studies. Reviewed by me in PACS.              MEDICATIONS GIVEN IN ER  Medications   acetaminophen (TYLENOL) tablet 650 mg (650 mg Oral Given 2/22/23 1406)              MEDICAL DECISION MAKING, PROGRESS, and CONSULTS    All labs have been independently reviewed by me.  All radiology studies have been reviewed by me and I have also  reviewed the radiology report.   EKG's independently viewed and interpreted by me.  Discussion below represents my analysis of pertinent findings related to patient's condition, differential diagnosis, treatment plan and final disposition.    Presents with knee pain after a mechanical fall  Concern on xray for possible tibial plateau fx.   No fx noted on CT lower ext  RICE and follow up    Additional sources:  - Discussed/ obtained information from independent historians:  n/a    - External (non-ED) record review:   12/8/2022 contusion of head  History of Present Illness  Patient is an 87-year-old female with past medical history of hypertension, hyperlipidemia, ICM, MI, CKD stage III, on prasugrel (no other AC) presenting to the emergency department after a fall. She was try to get up out of bed and had a fall, does not think she passed out however does not know. Unclear LOC. States some mild pain over her forehead where there is a bruise and over her lip where she had bitten her lip. She denies any nausea, vomiting, numbness, weakness, shortness of breath, dysuria, hematuria, swelling, change in vision or hearing. Just feels a little confused since her fall. She got up after the fall and took her dog out, has been ambulating with her walker which is baseline. Her family convinced her to come to the emergency department.  - Chronic or social conditions impacting care: age    - Shared decision making:  n/a      Orders placed during this visit:  Orders Placed This Encounter   Procedures   • XR Knee 3 View Right   • CT Lower Extremity Right Without Contrast   • Apply ace wrap         Additional orders considered but not ordered:  n/a        Differential diagnosis include, but not limited to:    Knee fracture, knee strain, ligamentous injury      Independent interpretation of labs, radiology studies, and discussions with consultants:  ED Course as of 02/22/23 1832 Wed Feb 22, 2023   1344 Discussed with Dr. Mercado, who  agrees with plan.  [JG]   1429 X-ray of the right knee interpreted by myself.  Minimal knee effusion.  No dislocation. [TD]   1534 Discussed ED workup and findings with patient. Verbalizes understanding and discharge instruction. Will follow up with ortho.  [JG]      ED Course User Index  [JG] Shruthi Flowers APRN  [TD] Derrick Mercado II, MD             DIAGNOSIS  Final diagnoses:   Contusion of right knee, initial encounter   Fall, initial encounter   Abnormal x-ray of knee   Effusion, right knee         DISPOSITION  discharge          Latest Documented Vital Signs:  As of 18:32 EST  BP- 155/82 HR- 75 Temp- 97.3 °F (36.3 °C) (Tympanic) O2 sat- 98%              --    Please note that portions of this were completed with a voice recognition program.       Note Disclaimer: At [facility], we believe that sharing information builds trust and better relationships. You are receiving this note because you are receiving care at [facility] or recently visited. It is possible you will see health information before a provider has talked with you about it. This kind of information can be easy to misunderstand. To help you fully understand what it means for your health, we urge you to discuss this note with your provider.           Shruthi Flowers APRN  02/22/23 [id number]

## 2023-02-22 NOTE — ED TRIAGE NOTES
Pt c/o right knee pain that started after she fell last night. Pt was getting off of chair when she lost balance. Pt landed on knee.    This RN wore mask and goggles during time of contact

## 2023-02-22 NOTE — ED NOTES
Pt c/o right knee pain. Pt states she tripped and fell. Pt denies hitting head or LOC. Pt reports taking blood thinners.     This RN wore mask, gloves, eyewear and all other appropriate PPE while performing patient care.

## 2023-02-24 ENCOUNTER — OFFICE VISIT (OUTPATIENT)
Dept: ORTHOPEDIC SURGERY | Facility: CLINIC | Age: 88
End: 2023-02-24
Payer: MEDICARE

## 2023-02-24 VITALS — BODY MASS INDEX: 19.08 KG/M2 | HEIGHT: 60 IN | TEMPERATURE: 97.8 F | WEIGHT: 97.2 LBS

## 2023-02-24 DIAGNOSIS — M25.561 ACUTE PAIN OF RIGHT KNEE: Primary | ICD-10-CM

## 2023-02-24 PROCEDURE — 99213 OFFICE O/P EST LOW 20 MIN: CPT | Performed by: ORTHOPAEDIC SURGERY

## 2023-02-24 PROCEDURE — 73562 X-RAY EXAM OF KNEE 3: CPT | Performed by: ORTHOPAEDIC SURGERY

## 2023-02-24 RX ORDER — TRAMADOL HYDROCHLORIDE 50 MG/1
50 TABLET ORAL EVERY 4 HOURS PRN
Qty: 60 TABLET | Refills: 0 | Status: SHIPPED | OUTPATIENT
Start: 2023-02-24

## 2023-02-24 NOTE — PROGRESS NOTES
Chief complaint: New right knee injury    Ms. Gil is seen today for new injury.  She tells me she got up off of her sofa last week and fell, striking the knee.  She was seen in the ER and had x-rays and a CT scan taken.  Those were reportedly negative.  She reports severe pain in the knee.  She has trouble standing and walking.  She says the symptoms are nearly identical to those that she was having on the left side when she had a distal femur fracture.  In that case, her x-rays were also negative and an MRI confirmed a nondisplaced fracture.  She is very concerned that she may have the same problem in the right knee.    Right knee is examined.  Skin is benign.  Trace effusion.  Moderate medial tenderness along the joint line.  She is a little tender along the proximal tibia and more significantly tender over the medial femoral condyle.  No palpable step-off or defect.  She can actively extend the knee albeit with discomfort.  Flexion is limited to about 60 or 70 degrees.  Could not really get a good assessment of stability due to guarding.  Calf is soft.  Intact motor and sensory function in her lower leg and foot.    Outside x-rays and CT scan left knee are reviewed along with the radiology reports.  She has some degenerative changes but no acute abnormalities are noted.    Assessment: Recent right knee injury, possible occult fracture    Plan: She is very concerned about the possibility of a radiographically occult fracture.  She says this is exactly what happened on the left side and the diagnosis was missed until she had the MRI.  She wants to get an MRI of the right knee.  I have agreed to order that study for her.  I told her I will call her when I see the results.  In the meantime, I did agree to give her a prescription for tramadol to take as needed.  Risk of this medicine were discussed.

## 2023-02-24 NOTE — ED PROVIDER NOTES
MD ATTESTATION NOTE    The SELVIN and I have discussed this patient's history, physical exam, and treatment plan.    I provided a substantive portion of the care of this patient. I personally performed the physical exam, in its entirety. The attached note describes my personal findings.      Marilynn Gil is a 87 y.o. female who presents to the ED c/o fall.  This was mechanical in nature.  She landed on her right knee.      On exam:  GENERAL: not distressed  HENT: nares patent  EYES: no scleral icterus  CV: regular rhythm, regular rate, 2+ right DP pulse  RESPIRATORY: normal effort  MUSCULOSKELETAL: Tight right knee extension, she has focal tenderness to the right tibial plateau  NEURO: alert, moves all extremities, follows commands  SKIN: warm, dry    Labs  No results found for this or any previous visit (from the past 24 hour(s)).    Radiology  No Radiology Exams Resulted Within Past 24 Hours    Medications given in the ED:  Medications   acetaminophen (TYLENOL) tablet 650 mg (650 mg Oral Given 2/22/23 1406)       Orders placed during this visit:  Orders Placed This Encounter   Procedures   • XR Knee 3 View Right   • CT Lower Extremity Right Without Contrast   • Apply ace wrap       Medical Decision Making:  ED Course as of 02/23/23 2301   Wed Feb 22, 2023   1344 Discussed with Dr. Mercado, who agrees with plan.  [JG]   1429 X-ray of the right knee interpreted by myself.  Minimal knee effusion.  No dislocation. [TD]   1534 Discussed ED workup and findings with patient. Verbalizes understanding and discharge instruction. Will follow up with ortho.  [JG]      ED Course User Index  [JG] Shruthi Flowers APRN  [TD] Derrick Mercado II, MD         PPE: Both the patient and I wore a surgical mask throughout the entire patient encounter.     Diagnosis  Final diagnoses:   Contusion of right knee, initial encounter   Fall, initial encounter   Abnormal x-ray of knee   Effusion, right knee        Derrick Mercado  Chris CALVERT MD  02/23/23 8013

## 2023-03-18 ENCOUNTER — HOSPITAL ENCOUNTER (OUTPATIENT)
Dept: MRI IMAGING | Facility: HOSPITAL | Age: 88
Discharge: HOME | End: 2023-03-18
Admitting: ORTHOPAEDIC SURGERY
Payer: MEDICARE

## 2023-03-18 DIAGNOSIS — M25.561 ACUTE PAIN OF RIGHT KNEE: ICD-10-CM

## 2023-03-18 PROCEDURE — 73721 MRI JNT OF LWR EXTRE W/O DYE: CPT

## 2023-03-20 ENCOUNTER — TELEPHONE (OUTPATIENT)
Dept: ORTHOPEDIC SURGERY | Facility: CLINIC | Age: 88
End: 2023-03-20
Payer: MEDICARE

## 2023-03-20 NOTE — TELEPHONE ENCOUNTER
I spoke to patient's granddaughter.  I provided her with the patient's right knee MRI results as reviewed by Dr. Adamson.  She does have a fracture.  Dr. Adamson recommends conservative management with a hinged knee brace and nonweightbearing for 3 months.  She says the patient lives alone and feels this is going to be very difficult for her.  She does not think she has the upper body strength to navigate a wheelchair inside her home.  She is asking about inpatient rehab.  I explained that because she is not currently in a inpatient facility my understanding is she may pay out-of-pocket if she goes into a rehab facility at this point.  I recommended she contact the insurance company for clarification verify coverage.  She verbalized understanding and appreciated the assistance.

## 2023-03-20 NOTE — TELEPHONE ENCOUNTER
Left message requesting a return call to me or Dr. Adamson.  Attempting to provide patient with right knee MRI results.

## 2023-03-23 DIAGNOSIS — G47.00 INSOMNIA: ICD-10-CM

## 2023-03-23 RX ORDER — ZOLPIDEM TARTRATE 10 MG/1
10 TABLET ORAL NIGHTLY PRN
Qty: 30 TABLET | Refills: 0 | Status: SHIPPED | OUTPATIENT
Start: 2023-03-23

## 2023-04-12 ENCOUNTER — OFFICE VISIT (OUTPATIENT)
Dept: INTERNAL MEDICINE | Facility: CLINIC | Age: 88
End: 2023-04-12
Payer: MEDICARE

## 2023-04-12 VITALS
TEMPERATURE: 98.6 F | HEIGHT: 60 IN | OXYGEN SATURATION: 91 % | SYSTOLIC BLOOD PRESSURE: 128 MMHG | WEIGHT: 97 LBS | DIASTOLIC BLOOD PRESSURE: 90 MMHG | BODY MASS INDEX: 19.04 KG/M2 | HEART RATE: 59 BPM

## 2023-04-12 DIAGNOSIS — E11.65 TYPE 2 DIABETES MELLITUS WITH HYPERGLYCEMIA, UNSPECIFIED WHETHER LONG TERM INSULIN USE: ICD-10-CM

## 2023-04-12 DIAGNOSIS — E78.2 MIXED HYPERLIPIDEMIA: ICD-10-CM

## 2023-04-12 DIAGNOSIS — Z23 ENCOUNTER FOR IMMUNIZATION: ICD-10-CM

## 2023-04-12 DIAGNOSIS — N18.4 CRI (CHRONIC RENAL INSUFFICIENCY), STAGE 4 (SEVERE): Primary | ICD-10-CM

## 2023-04-12 DIAGNOSIS — I10 ESSENTIAL HYPERTENSION, BENIGN: ICD-10-CM

## 2023-04-12 DIAGNOSIS — S72.92XD CLOSED FRACTURE OF LEFT FEMUR WITH ROUTINE HEALING, UNSPECIFIED FRACTURE MORPHOLOGY, UNSPECIFIED PORTION OF FEMUR, SUBSEQUENT ENCOUNTER: ICD-10-CM

## 2023-04-12 DIAGNOSIS — I25.5 ISCHEMIC CARDIOMYOPATHY: ICD-10-CM

## 2023-04-12 DIAGNOSIS — J41.0 SIMPLE CHRONIC BRONCHITIS: ICD-10-CM

## 2023-04-12 DIAGNOSIS — K21.9 GASTROESOPHAGEAL REFLUX DISEASE WITHOUT ESOPHAGITIS: ICD-10-CM

## 2023-04-12 PROBLEM — J42 CHRONIC BRONCHITIS: Status: ACTIVE | Noted: 2023-01-01

## 2023-04-12 PROBLEM — S72.90XA FEMORAL FRACTURE: Status: ACTIVE | Noted: 2023-01-01

## 2023-04-12 RX ORDER — FUROSEMIDE 20 MG/1
20 TABLET ORAL DAILY
Qty: 90 TABLET | Refills: 1 | Status: SHIPPED | OUTPATIENT
Start: 2023-04-12

## 2023-04-12 RX ORDER — PANTOPRAZOLE SODIUM 40 MG/1
40 TABLET, DELAYED RELEASE ORAL DAILY
Qty: 90 TABLET | Refills: 0 | Status: SHIPPED | OUTPATIENT
Start: 2023-04-12

## 2023-04-12 RX ORDER — ALBUTEROL SULFATE 90 UG/1
2 AEROSOL, METERED RESPIRATORY (INHALATION) EVERY 4 HOURS PRN
Qty: 18 G | Refills: 11 | Status: SHIPPED | OUTPATIENT
Start: 2023-04-12

## 2023-04-12 RX ORDER — POTASSIUM CHLORIDE 750 MG/1
10 TABLET, EXTENDED RELEASE ORAL DAILY
Qty: 90 TABLET | Refills: 1 | Status: SHIPPED | OUTPATIENT
Start: 2023-04-12

## 2023-04-12 RX ORDER — ROSUVASTATIN CALCIUM 10 MG/1
10 TABLET, COATED ORAL DAILY
Qty: 90 TABLET | Refills: 2 | Status: SHIPPED | OUTPATIENT
Start: 2023-04-12

## 2023-04-12 NOTE — ASSESSMENT & PLAN NOTE
Lipid abnormalities are improving with treatment.  Nutritional counseling was provided. and Pharmacotherapy as ordered.  Lipids will be reassessed at next appt.

## 2023-04-12 NOTE — ASSESSMENT & PLAN NOTE
Had been following with Dr. Adamson's office.   Patient was recommended non weight bearing x 3 months.   Patient is currently in w/c.   May need to f/u with Dr. Adamson.

## 2023-04-12 NOTE — ASSESSMENT & PLAN NOTE
Diabetes is improving with lifestyle modifications.   Continue current treatment regimen.  Dietary recommendations for ADA diet.  Regular aerobic exercise.  Discussed ways to avoid symptomatic hypoglycemia.  Discussed sick day management.  Discussed foot care.  Reminded to get yearly retinal exam.  Diabetes will be reassessed in 4 months

## 2023-04-12 NOTE — PROGRESS NOTES
"Chief Complaint  Hyperlipidemia and Hypertension    Subjective        Marilynn Gil presents to [facility]  History of Present Illness  This is a 87 y/o female presenting to office for f/u health conditions. Patient is here with grand daughter today.     HTN-- continues on lisinopril, metoprolol; denies checking BP at home. Denies any CP.   Continues following with Dr. Valerio with cardiology- hx ischemic cardiomyopathy.     Hx CKD stage 4-- continues on tramadol PRN and tylenol. Avoiding NSAIDS. Patient reports drinking 3-4 glasses h20 daily.     Hx insomnia-- continues on ambien nightly; cannot sleep without this medication. Patient denies any abnormal side effects to medication; tolerating well.     DM2--- continues with oral control. Patient has not had diabetic eye exam. Denies any open foot wounds.     Recent femoral fx-- has been following with Dr. Adamson-- recommended non weightbearing x 3 months.       Objective   Vital Signs:  /90 (BP Location: Left arm, Patient Position: Sitting, Cuff Size: Adult)   Pulse 59   Temp 98.6 °F (37 °C) (Infrared)   Ht 152.4 cm (60\")   Wt 44 kg (97 lb)   SpO2 91%   BMI 18.94 kg/m²   Estimated body mass index is 18.94 kg/m² as calculated from the following:    Height as of this encounter: 152.4 cm (60\").    Weight as of this encounter: 44 kg (97 lb).       BMI is within normal parameters. No other follow-up for BMI required.      Physical Exam  Constitutional:       Appearance: Normal appearance.   HENT:      Head: Normocephalic and atraumatic.      Right Ear: External ear normal.      Left Ear: External ear normal.   Eyes:      Conjunctiva/sclera: Conjunctivae normal.      Pupils: Pupils are equal, round, and reactive to light.      Comments: +glasses   Neck:      Vascular: No carotid bruit.   Cardiovascular:      Rate and Rhythm: Normal rate and regular rhythm.      Pulses: Normal pulses.      Heart sounds: Normal heart sounds. No " murmur heard.    No friction rub. No gallop.   Pulmonary:      Effort: Pulmonary effort is normal. No respiratory distress.      Breath sounds: Normal breath sounds. No stridor. No wheezing, rhonchi or rales.   Musculoskeletal:         General: Tenderness present.      Cervical back: Normal range of motion and neck supple.      Comments: Left leg   Skin:     General: Skin is warm and dry.   Neurological:      Mental Status: She is alert and oriented to person, place, and time. Mental status is at baseline.      Motor: Weakness present.      Gait: Gait abnormal.      Comments: In w/c   Psychiatric:         Mood and Affect: Mood normal.         Thought Content: Thought content normal.         Judgment: Judgment normal.        Result Review :  The following data was reviewed by: NATIVIDAD Hernandez on 04/12/2023:  Common labs        10/11/2022    11:08   Common Labs   Glucose 99     BUN 31     Creatinine 1.99     Sodium 141     Potassium 5.2     Chloride 103     Calcium 9.2     Total Protein 7.0     Albumin 4.50     Total Bilirubin 0.2     Alkaline Phosphatase 143     AST (SGOT) 23     ALT (SGPT) 11     WBC 6.14     Hemoglobin 12.6     Hematocrit 39.1     Platelets 176     Total Cholesterol 210     Triglycerides 161     HDL Cholesterol 67     LDL Cholesterol  115     Hemoglobin A1C 6.80       Reviewed:  Office Visit with Vishal Adamson MD (02/24/2023)  CT Chest Without Contrast (01/12/2017 4:44 PM)               Assessment and Plan   Diagnoses and all orders for this visit:    1. CRI (chronic renal insufficiency), stage 4 (severe) (Primary)  Assessment & Plan:  Previously saw Dr. Sood  Continue avoiding nephrotoxic medications  Continue with hydration goal of 64 ounces h20  Labs today     Orders:  -     CBC & Differential  -     Comprehensive metabolic panel    2. Type 2 diabetes mellitus with hyperglycemia, unspecified whether long term insulin use  Assessment & Plan:  Diabetes is improving with lifestyle  modifications.   Continue current treatment regimen.  Dietary recommendations for ADA diet.  Regular aerobic exercise.  Discussed ways to avoid symptomatic hypoglycemia.  Discussed sick day management.  Discussed foot care.  Reminded to get yearly retinal exam.  Diabetes will be reassessed in 4 months    Orders:  -     Hemoglobin A1c  -     MicroAlbumin, Urine, Random - Urine, Clean Catch    3. Mixed hyperlipidemia  Assessment & Plan:  Lipid abnormalities are improving with treatment.  Nutritional counseling was provided. and Pharmacotherapy as ordered.  Lipids will be reassessed at next appt.    Orders:  -     TSH  -     T4, free  -     Lipid panel  -     rosuvastatin (CRESTOR) 10 MG tablet; Take 1 tablet by mouth Daily.  Dispense: 90 tablet; Refill: 2    4. Essential hypertension, benign  Assessment & Plan:  Hypertension is improving with treatment.  Continue current treatment regimen.  Blood pressure will be reassessed at the next regular appointment.    Orders:  -     furosemide (LASIX) 20 MG tablet; Take 1 tablet by mouth Daily.  Dispense: 90 tablet; Refill: 1  -     metoprolol tartrate (LOPRESSOR) 25 MG tablet; Take 1 tablet by mouth 2 (Two) Times a Day.  Dispense: 180 tablet; Refill: 3    5. Closed fracture of left femur with routine healing, unspecified fracture morphology, unspecified portion of femur, subsequent encounter  Assessment & Plan:  Had been following with Dr. Adamson's office.   Patient was recommended non weight bearing x 3 months.   Patient is currently in w/c.   May need to f/u with Dr. Adamson.       6. Encounter for immunization  -     COVID-19 Bivalent Booster (Pfizer) 12+yrs    7. Gastroesophageal reflux disease without esophagitis  Assessment & Plan:  Continues on PPI.     Orders:  -     pantoprazole (PROTONIX) 40 MG EC tablet; Take 1 tablet by mouth Daily.  Dispense: 90 tablet; Refill: 0    8. Ischemic cardiomyopathy  -     furosemide (LASIX) 20 MG tablet; Take 1 tablet by mouth Daily.   Dispense: 90 tablet; Refill: 1  -     potassium chloride (K-DUR,KLOR-CON) 10 MEQ CR tablet; Take 1 tablet by mouth Daily.  Dispense: 90 tablet; Refill: 1    9. Simple chronic bronchitis  Assessment & Plan:  Recommended albuterol PRN.   Patient reports she doesn't wheeze consistently.     Orders:  -     albuterol sulfate  (90 Base) MCG/ACT inhaler; Inhale 2 puffs Every 4 (Four) Hours As Needed for Wheezing.  Dispense: 18 g; Refill: 11           Follow Up   Return in about 4 months (around 8/12/2023) for Recheck chronic conditions .  Patient was given instructions and counseling regarding her condition or for health maintenance advice. Please see specific information pulled into the AVS if appropriate.

## 2023-04-12 NOTE — ASSESSMENT & PLAN NOTE
Previously saw Dr. Sood  Continue avoiding nephrotoxic medications  Continue with hydration goal of 64 ounces h20  Labs today

## 2023-04-13 LAB
ALBUMIN SERPL-MCNC: 4.4 G/DL (ref 3.5–5.2)
ALBUMIN/GLOB SERPL: 1.7 G/DL
ALP SERPL-CCNC: 171 U/L (ref 39–117)
ALT SERPL-CCNC: <5 U/L (ref 1–33)
AST SERPL-CCNC: 17 U/L (ref 1–32)
BASOPHILS # BLD AUTO: 0.04 10*3/MM3 (ref 0–0.2)
BASOPHILS NFR BLD AUTO: 0.6 % (ref 0–1.5)
BILIRUB SERPL-MCNC: 0.2 MG/DL (ref 0–1.2)
BUN SERPL-MCNC: 27 MG/DL (ref 8–23)
BUN/CREAT SERPL: 15 (ref 7–25)
CALCIUM SERPL-MCNC: 9.4 MG/DL (ref 8.6–10.5)
CHLORIDE SERPL-SCNC: 107 MMOL/L (ref 98–107)
CHOLEST SERPL-MCNC: 200 MG/DL (ref 0–200)
CO2 SERPL-SCNC: 24.8 MMOL/L (ref 22–29)
CREAT SERPL-MCNC: 1.8 MG/DL (ref 0.57–1)
EGFRCR SERPLBLD CKD-EPI 2021: 27.2 ML/MIN/1.73
EOSINOPHIL # BLD AUTO: 0.21 10*3/MM3 (ref 0–0.4)
EOSINOPHIL NFR BLD AUTO: 3.3 % (ref 0.3–6.2)
ERYTHROCYTE [DISTWIDTH] IN BLOOD BY AUTOMATED COUNT: 14.5 % (ref 12.3–15.4)
GLOBULIN SER CALC-MCNC: 2.6 GM/DL
GLUCOSE SERPL-MCNC: 106 MG/DL (ref 65–99)
HBA1C MFR BLD: 5.9 % (ref 4.8–5.6)
HCT VFR BLD AUTO: 37.8 % (ref 34–46.6)
HDLC SERPL-MCNC: 57 MG/DL (ref 40–60)
HGB BLD-MCNC: 11.6 G/DL (ref 12–15.9)
IMM GRANULOCYTES # BLD AUTO: 0.01 10*3/MM3 (ref 0–0.05)
IMM GRANULOCYTES NFR BLD AUTO: 0.2 % (ref 0–0.5)
LDLC SERPL CALC-MCNC: 102 MG/DL (ref 0–100)
LYMPHOCYTES # BLD AUTO: 1.5 10*3/MM3 (ref 0.7–3.1)
LYMPHOCYTES NFR BLD AUTO: 23.5 % (ref 19.6–45.3)
MCH RBC QN AUTO: 26.1 PG (ref 26.6–33)
MCHC RBC AUTO-ENTMCNC: 30.7 G/DL (ref 31.5–35.7)
MCV RBC AUTO: 84.9 FL (ref 79–97)
MICROALBUMIN UR-MCNC: 6.3 UG/ML
MONOCYTES # BLD AUTO: 0.54 10*3/MM3 (ref 0.1–0.9)
MONOCYTES NFR BLD AUTO: 8.5 % (ref 5–12)
NEUTROPHILS # BLD AUTO: 4.07 10*3/MM3 (ref 1.7–7)
NEUTROPHILS NFR BLD AUTO: 63.9 % (ref 42.7–76)
NRBC BLD AUTO-RTO: 0 /100 WBC (ref 0–0.2)
PLATELET # BLD AUTO: 198 10*3/MM3 (ref 140–450)
POTASSIUM SERPL-SCNC: 5.2 MMOL/L (ref 3.5–5.2)
PROT SERPL-MCNC: 7 G/DL (ref 6–8.5)
RBC # BLD AUTO: 4.45 10*6/MM3 (ref 3.77–5.28)
SODIUM SERPL-SCNC: 143 MMOL/L (ref 136–145)
T4 FREE SERPL-MCNC: 1.56 NG/DL (ref 0.93–1.7)
TRIGL SERPL-MCNC: 243 MG/DL (ref 0–150)
TSH SERPL DL<=0.005 MIU/L-ACNC: 1.06 UIU/ML (ref 0.27–4.2)
VLDLC SERPL CALC-MCNC: 41 MG/DL (ref 5–40)
WBC # BLD AUTO: 6.37 10*3/MM3 (ref 3.4–10.8)

## 2023-04-16 DIAGNOSIS — G47.00 INSOMNIA: ICD-10-CM

## 2023-04-17 DIAGNOSIS — G47.00 INSOMNIA: ICD-10-CM

## 2023-04-17 RX ORDER — ZOLPIDEM TARTRATE 10 MG/1
10 TABLET ORAL NIGHTLY PRN
Qty: 7 TABLET | Refills: 0 | Status: CANCELLED | OUTPATIENT
Start: 2023-04-17

## 2023-04-17 RX ORDER — ZOLPIDEM TARTRATE 10 MG/1
10 TABLET ORAL NIGHTLY PRN
Qty: 30 TABLET | Refills: 2 | Status: SHIPPED | OUTPATIENT
Start: 2023-04-17

## 2023-04-17 NOTE — TELEPHONE ENCOUNTER
Caller: HATTIE BASURTO    Relationship: Emergency Contact    Best call back number: [phone]    What is the best time to reach you: ANYTIME     Who are you requesting to speak with (clinical staff, provider,  specific staff member): CLINICAL     What was the call regarding: PATIENTS GRANDDAUGHTER STATES SHE FORGOT TO MENTION AT HER LAST APPOINTMENT THAT SHE THINKS THE PATIENT MAY BE WAKING UP DURING THE NIGHT SOMETIMES AND TAKING ANOTHER AMBIEN TABLET DUE TO THE PATIENT BEING OUT OF THE AMBIEN MEDICATION ALREADY AND SHE IS NOT DUE FOR A REFILL UNTIL 04/24/2023.     PATIENTS GRANDDAUGHTER STATES SHE IS WANTING TO KNOW IF THE AMBIEN PRESCRIPTION CAN BE UPPED TO TAKING 2 AT NIGHT TIME.     PATIENTS GRANDDAUGHTER IS REQUESTING A CALL BACK TO LET HER KNOW.

## 2023-04-17 NOTE — TELEPHONE ENCOUNTER
Daughter advised    Daughter asked if you can send in a week supply to [facility] in [address] until she is unable to get it filled at pharmacy on file

## 2023-04-17 NOTE — TELEPHONE ENCOUNTER
Please let patient know-- I cannot refill this until it is due and cannot send it to another pharmacy per their controlled contract. I would recommend using melatonin 5-10 mg QHS in the meantime.   It is dangerous to be doubling up on 2 dose of ambien a night. If patient is still suffering from insomnia, we can refer to sleep medicine for further evaluation.

## 2023-04-17 NOTE — PROGRESS NOTES
Please let patient know--   Hemoglobin is stable  Kidney function still consistent with CKD stage 4-- recommend hydration goal of 64 ounces h20 daily; avoid NSAIDS.   We will continue to keep a close eye on this; but I would still recommend following with Dr. Sood the kidney doctor she previously saw so he can help manage this and prevent this from worsening.   A1C has greatly improved-- now at 5.9-- recommend continuing with low glycemic diet.   Lipid panel has improved; LDL at 102. I recommend continuing on rosuvastatin; recommend low saturated fat/low refined carbohydrate diet.   Thyroid levels normal.   Microalbumin urine test normal as well.     F/u with me as scheduled

## 2023-04-18 NOTE — TELEPHONE ENCOUNTER
Daughter advised    Daughter also interested in sleep medicine but wants to know if patient will be able to stay on Ambien until she sees them

## 2023-04-26 ENCOUNTER — OFFICE VISIT (OUTPATIENT)
Dept: INTERNAL MEDICINE | Facility: CLINIC | Age: 88
End: 2023-04-26
Payer: MEDICARE

## 2023-04-26 VITALS
HEIGHT: 60 IN | SYSTOLIC BLOOD PRESSURE: 102 MMHG | HEART RATE: 65 BPM | BODY MASS INDEX: 19.04 KG/M2 | DIASTOLIC BLOOD PRESSURE: 78 MMHG | WEIGHT: 97 LBS | OXYGEN SATURATION: 93 %

## 2023-04-26 DIAGNOSIS — R35.89 POLYURIA: Primary | ICD-10-CM

## 2023-04-26 PROCEDURE — 81003 URINALYSIS AUTO W/O SCOPE: CPT | Performed by: NURSE PRACTITIONER

## 2023-04-26 PROCEDURE — 1159F MED LIST DOCD IN RCRD: CPT | Performed by: NURSE PRACTITIONER

## 2023-04-26 PROCEDURE — 99213 OFFICE O/P EST LOW 20 MIN: CPT | Performed by: NURSE PRACTITIONER

## 2023-04-26 PROCEDURE — 1160F RVW MEDS BY RX/DR IN RCRD: CPT | Performed by: NURSE PRACTITIONER

## 2023-04-26 RX ORDER — CEFUROXIME AXETIL 500 MG/1
500 TABLET ORAL 2 TIMES DAILY
Qty: 14 TABLET | Refills: 0 | Status: SHIPPED | OUTPATIENT
Start: 2023-04-26 | End: 2023-05-03

## 2023-04-26 NOTE — PATIENT INSTRUCTIONS
You have been diagnosed with a urinary tract infection (UTI). An antibiotic has been prescribed for you that needs to be taken until gone, even if you feel better prior to completing the medication. It is recommended that you take a probiotic AFTER completing your antibiotic course for about 30 days; probiotics are available over the counter in pill form and can be found in certain yogurt brands. Increase you intake of water; you may also drink low sugar cranberry juice. Avoid caffeine as this tends to irritate the bladder wall.      For recurrent infections it is best to avoid bathing in a tub, always wipe from front to back, urinate before and after sexual intercourse, avoid tight fitting pants, and wear cotton underwear.  If you develop fever, nausea, vomiting, flank pain notify your provider.

## 2023-04-26 NOTE — PROGRESS NOTES
"        Chief Complaint  Polyuria     Subjective:      History of Present Illness {CC  Problem List  Visit  Diagnosis   Encounters  Notes  Medications  Labs  Result Review Imaging  Media :23}     Marilynn Gil is a patient of Pam Charles APRN and presents to [facility] for     Urinary Tract Infection   This is a new problem. Episode onset: Sunday. The problem occurs every urination. The problem has been gradually worsening. The quality of the pain is described as burning. There has been no fever. Associated symptoms include frequency and urgency. Pertinent negatives include no flank pain, hematuria or vomiting.          I have reviewed patient's medical history, any new submitted information provided by patient or medical assistant and updated medical record.      Objective:      Physical Exam  Cardiovascular:      Rate and Rhythm: Normal rate and regular rhythm.      Pulses: Normal pulses.      Heart sounds: Normal heart sounds.   Pulmonary:      Effort: Pulmonary effort is normal.      Breath sounds: Normal breath sounds.   Abdominal:      Tenderness: There is no right CVA tenderness or left CVA tenderness.        Result Review  Data Reviewed:{ Labs  Result Review  Imaging  Med Tab  Media :23}     The following data was reviewed by: Sara Viera III, NP-C on 04/26/2023    URINE DIPSTICK:    Glucose negative, bilirubin negative, ketone negative, blood negative, leuk trace, pH 5           Vital Signs:   /78 (BP Location: Left arm, Patient Position: Sitting, Cuff Size: Adult)   Pulse 65   Ht 152.4 cm (60\")   Wt 44 kg (97 lb)   SpO2 93%   BMI 18.94 kg/m²         Requested Prescriptions     Signed Prescriptions Disp Refills   • cefuroxime (CEFTIN) 500 MG tablet 14 tablet 0     Sig: Take 1 tablet by mouth 2 (Two) Times a Day for 7 days.       Routine medications provided by this office will also be refilled via pharmacy request.       Current " Outpatient Medications:   •  Acetaminophen (TYLENOL PO), Take 500 mg by mouth Every 6 (Six) Hours As Needed (pain)., Disp: , Rfl:   •  albuterol sulfate  (90 Base) MCG/ACT inhaler, Inhale 2 puffs Every 4 (Four) Hours As Needed for Wheezing., Disp: 18 g, Rfl: 11  •  aspirin 81 MG chewable tablet, Chew 1 tablet Daily., Disp: , Rfl:   •  Diclofenac Sodium (VOLTAREN) 1 % gel gel, Apply 4 g topically to the appropriate area as directed 4 (Four) Times a Day As Needed (pain)., Disp: 150 g, Rfl: 1  •  furosemide (LASIX) 20 MG tablet, Take 1 tablet by mouth Daily., Disp: 90 tablet, Rfl: 1  •  isosorbide mononitrate (IMDUR) 60 MG 24 hr tablet, 1 tablet Daily., Disp: , Rfl: 3  •  lisinopril (PRINIVIL,ZESTRIL) 5 MG tablet, Take 1 tablet by mouth Daily., Disp: , Rfl:   •  metoprolol tartrate (LOPRESSOR) 25 MG tablet, Take 1 tablet by mouth 2 (Two) Times a Day., Disp: 180 tablet, Rfl: 3  •  NITROSTAT 0.4 MG SL tablet, 1 tab sublingual every 5 minutes x 3 as needed for chest pressure, Disp: , Rfl: 0  •  pantoprazole (PROTONIX) 40 MG EC tablet, Take 1 tablet by mouth Daily., Disp: 90 tablet, Rfl: 0  •  potassium chloride (K-DUR,KLOR-CON) 10 MEQ CR tablet, Take 1 tablet by mouth Daily., Disp: 90 tablet, Rfl: 1  •  prasugrel (EFFIENT) tablet, Take 1 tablet by mouth Daily., Disp: , Rfl:   •  rosuvastatin (CRESTOR) 10 MG tablet, Take 1 tablet by mouth Daily., Disp: 90 tablet, Rfl: 2  •  traMADol (ULTRAM) 50 MG tablet, Take 1 tablet by mouth Every 4 (Four) Hours As Needed for Moderate Pain., Disp: 60 tablet, Rfl: 0  •  zolpidem (AMBIEN) 10 MG tablet, TAKE 1 TABLET BY MOUTH AT NIGHT AS NEEDED FOR SLEEP, Disp: 30 tablet, Rfl: 2  •  cefuroxime (CEFTIN) 500 MG tablet, Take 1 tablet by mouth 2 (Two) Times a Day for 7 days., Disp: 14 tablet, Rfl: 0     Assessment and Plan:      Assessment and Plan {CC Problem List  Visit Diagnosis  ROS  Review (Popup)  Health Maintenance  Quality  BestPractice  Medications  SmartSets   SnapShot Encounters  Media :23}     Problem List Items Addressed This Visit    None  Visit Diagnoses     Polyuria    -  Primary        Will send for culture: if need to change abx, patient will be notified.       Follow Up {Instructions Charge Capture  Follow-up Communications :23}     Return if symptoms worsen or fail to improve.    Follow up with PCP as scheduled.     Patient was given instructions and counseling regarding her condition or for health maintenance advice. Please see specific information pulled into the AVS if appropriate.    Dragon disclaimer:   Much of this encounter note is an electronic transcription/translation of spoken language to printed text. The electronic translation of spoken language may permit erroneous, or at times, nonsensical words or phrases to be inadvertently transcribed; Although I have reviewed the note for such errors, some may still exist.     Additional Patient Counseling:       Patient Instructions   You have been diagnosed with a urinary tract infection (UTI). An antibiotic has been prescribed for you that needs to be taken until gone, even if you feel better prior to completing the medication. It is recommended that you take a probiotic AFTER completing your antibiotic course for about 30 days; probiotics are available over the counter in pill form and can be found in certain yogurt brands. Increase you intake of water; you may also drink low sugar cranberry juice. Avoid caffeine as this tends to irritate the bladder wall.      For recurrent infections it is best to avoid bathing in a tub, always wipe from front to back, urinate before and after sexual intercourse, avoid tight fitting pants, and wear cotton underwear.  If you develop fever, nausea, vomiting, flank pain notify your provider.

## 2023-04-27 LAB
BILIRUB BLD-MCNC: NEGATIVE MG/DL
CLARITY, POC: CLEAR
COLOR UR: YELLOW
EXPIRATION DATE: ABNORMAL
GLUCOSE UR STRIP-MCNC: NEGATIVE MG/DL
KETONES UR QL: NEGATIVE
LEUKOCYTE EST, POC: ABNORMAL
Lab: ABNORMAL
NITRITE UR-MCNC: NEGATIVE MG/ML
PH UR: 5 [PH] (ref 5–8)
PROT UR STRIP-MCNC: NEGATIVE MG/DL
RBC # UR STRIP: NEGATIVE /UL
SP GR UR: 1.01 (ref 1–1.03)
UROBILINOGEN UR QL: ABNORMAL

## 2023-06-16 ENCOUNTER — OFFICE VISIT (OUTPATIENT)
Dept: INTERNAL MEDICINE | Facility: CLINIC | Age: 88
End: 2023-06-16
Payer: MEDICARE

## 2023-06-16 VITALS
TEMPERATURE: 97.8 F | OXYGEN SATURATION: 94 % | SYSTOLIC BLOOD PRESSURE: 124 MMHG | HEIGHT: 60 IN | DIASTOLIC BLOOD PRESSURE: 63 MMHG | BODY MASS INDEX: 19.99 KG/M2 | HEART RATE: 64 BPM | WEIGHT: 101.8 LBS

## 2023-06-16 DIAGNOSIS — R30.9 PAIN WITH URINATION: ICD-10-CM

## 2023-06-16 DIAGNOSIS — R35.0 URINARY FREQUENCY: Primary | ICD-10-CM

## 2023-06-16 DIAGNOSIS — R35.0 URINARY FREQUENCY: ICD-10-CM

## 2023-06-16 LAB
BACTERIA UR QL AUTO: ABNORMAL /HPF
BILIRUB UR QL STRIP: NEGATIVE
CLARITY UR: CLEAR
COLOR UR: ABNORMAL
GLUCOSE UR STRIP-MCNC: NEGATIVE MG/DL
HGB UR QL STRIP.AUTO: ABNORMAL
HYALINE CASTS UR QL AUTO: ABNORMAL /LPF
KETONES UR QL STRIP: NEGATIVE
LEUKOCYTE ESTERASE UR QL STRIP.AUTO: NEGATIVE
NITRITE UR QL STRIP: NEGATIVE
PH UR STRIP.AUTO: 5.5 [PH] (ref 5–8)
PROT UR QL STRIP: NEGATIVE
RBC # UR STRIP: ABNORMAL /HPF
REF LAB TEST METHOD: ABNORMAL
SP GR UR STRIP: 1.01 (ref 1–1.03)
SQUAMOUS #/AREA URNS HPF: ABNORMAL /HPF
UROBILINOGEN UR QL STRIP: ABNORMAL
WBC # UR STRIP: ABNORMAL /HPF

## 2023-06-16 RX ORDER — CEFUROXIME AXETIL 500 MG/1
500 TABLET ORAL 2 TIMES DAILY
Qty: 14 TABLET | Refills: 0 | Status: SHIPPED | OUTPATIENT
Start: 2023-06-16 | End: 2023-06-23

## 2023-06-16 NOTE — PROGRESS NOTES
Chief Complaint  Urinary Frequency (bruning)     Subjective:      History of Present Illness {CC  Problem List  Visit  Diagnosis   Encounters  Notes  Medications  Labs  Result Review Imaging  Media :23}     Marilynn Gil presents to [facility] for:      Urinary Tract Infection   This is a new problem. The current episode started yesterday. The problem occurs every urination. The problem has been rapidly worsening. The quality of the pain is described as aching. Associated symptoms include frequency, nausea and urgency. Pertinent negatives include no chills, discharge, flank pain or hematuria. She has tried nothing for the symptoms. Her past medical history is significant for recurrent UTIs.      Pt had UTI 4/2023 and was tx with ceftin.Pt states she tolerated it well.   Pt has an upset stomach, but states that recently started. Pt is having frequent bowel movents but states they are formed. Pt denies nausea or fevers. Pt states she does not wear briefs and takes showers daily.     I have reviewed patient's medical history, any new submitted information provided by patient or medical assistant and updated medical record.      Objective:      Physical Exam  Vitals reviewed.   Constitutional:       Appearance: Normal appearance. She is ill-appearing. She is not toxic-appearing or diaphoretic.   HENT:      Head: Normocephalic.   Cardiovascular:      Rate and Rhythm: Normal rate and regular rhythm.      Pulses: Normal pulses.      Heart sounds: Normal heart sounds.   Pulmonary:      Effort: Pulmonary effort is normal.      Breath sounds: Normal breath sounds.   Abdominal:      General: Abdomen is flat. Bowel sounds are normal.      Palpations: Abdomen is soft.      Tenderness: There is abdominal tenderness. There is right CVA tenderness and left CVA tenderness.   Skin:     General: Skin is warm and dry.      Capillary Refill: Capillary refill takes less than 2 seconds.  "  Neurological:      General: No focal deficit present.      Mental Status: She is alert.   Psychiatric:         Mood and Affect: Mood normal.         Behavior: Behavior normal.      Result Review  Data Reviewed:{ Labs  Result Review  Imaging  Med Tab  Media :23}         URINE DIPSTICK:  Lab Results - Last 18 Months   Lab Units 06/16/23  1136 04/27/23  0850   COLOR UA  Straw Yellow   CLARITY UA  Clear Clear   SPECIFIC GRAVITY, URINE   --  1.015   PH, URINE  5.5 5.0   LEUKOCYTES UA  Negative Trace*   NITRITE UA   --  Negative   PROTEIN UA mg/dL  --  Negative   UROBILINOGEN UA  0.2 E.U./dL 0.2 E.U./dL   BILIRUBIN UA  Negative Negative   BLOOD UA   --  Negative          Vital Signs:   /63 (BP Location: Right arm, Patient Position: Sitting, Cuff Size: Adult)   Pulse 64   Temp 97.8 °F (36.6 °C) (Oral)   Ht 152.4 cm (60\")   Wt 46.2 kg (101 lb 12.8 oz)   SpO2 94%   BMI 19.88 kg/m²         Requested Prescriptions     Signed Prescriptions Disp Refills    cefuroxime (CEFTIN) 500 MG tablet 14 tablet 0     Sig: Take 1 tablet by mouth 2 (Two) Times a Day for 7 days.       Routine medications provided by this office will also be refilled via pharmacy request.       Current Outpatient Medications:     Acetaminophen (TYLENOL PO), Take 500 mg by mouth Every 6 (Six) Hours As Needed (pain)., Disp: , Rfl:     albuterol sulfate  (90 Base) MCG/ACT inhaler, Inhale 2 puffs Every 4 (Four) Hours As Needed for Wheezing., Disp: 18 g, Rfl: 11    aspirin 81 MG chewable tablet, Chew 1 tablet Daily., Disp: , Rfl:     Diclofenac Sodium (VOLTAREN) 1 % gel gel, Apply 4 g topically to the appropriate area as directed 4 (Four) Times a Day As Needed (pain)., Disp: 150 g, Rfl: 1    furosemide (LASIX) 20 MG tablet, Take 1 tablet by mouth Daily., Disp: 90 tablet, Rfl: 1    isosorbide mononitrate (IMDUR) 60 MG 24 hr tablet, 1 tablet Daily., Disp: , Rfl: 3    lisinopril (PRINIVIL,ZESTRIL) 5 MG tablet, Take 1 tablet by mouth Daily., " Disp: , Rfl:     metoprolol tartrate (LOPRESSOR) 25 MG tablet, Take 1 tablet by mouth 2 (Two) Times a Day., Disp: 180 tablet, Rfl: 3    NITROSTAT 0.4 MG SL tablet, 1 tab sublingual every 5 minutes x 3 as needed for chest pressure, Disp: , Rfl: 0    pantoprazole (PROTONIX) 40 MG EC tablet, Take 1 tablet by mouth Daily., Disp: 90 tablet, Rfl: 0    potassium chloride (K-DUR,KLOR-CON) 10 MEQ CR tablet, Take 1 tablet by mouth Daily., Disp: 90 tablet, Rfl: 1    prasugrel (EFFIENT) tablet, Take 1 tablet by mouth Daily., Disp: , Rfl:     rosuvastatin (CRESTOR) 10 MG tablet, Take 1 tablet by mouth Daily., Disp: 90 tablet, Rfl: 2    traMADol (ULTRAM) 50 MG tablet, Take 1 tablet by mouth Every 4 (Four) Hours As Needed for Moderate Pain., Disp: 60 tablet, Rfl: 0    zolpidem (AMBIEN) 10 MG tablet, TAKE 1 TABLET BY MOUTH AT NIGHT AS NEEDED FOR SLEEP, Disp: 30 tablet, Rfl: 2    cefuroxime (CEFTIN) 500 MG tablet, Take 1 tablet by mouth 2 (Two) Times a Day for 7 days., Disp: 14 tablet, Rfl: 0     Assessment and Plan:      Assessment and Plan {CC Problem List  Visit Diagnosis  ROS  Review (Popup)  Health Maintenance  Quality  BestPractice  Medications  SmartSets  SnapShot Encounters  Media :23}     Problem List Items Addressed This Visit    None  Visit Diagnoses       Urinary frequency    -  Primary    Relevant Medications    cefuroxime (CEFTIN) 500 MG tablet    Other Relevant Orders    Urinalysis With Culture If Indicated - Urine, Clean Catch (Completed)    Urinalysis, Microscopic Only - Urine, Clean Catch (Completed)    Pain with urination        Relevant Medications    cefuroxime (CEFTIN) 500 MG tablet    Other Relevant Orders    Urinalysis With Culture If Indicated - Urine, Clean Catch (Completed)    Urinalysis, Microscopic Only - Urine, Clean Catch (Completed)          Pt educated on dosing and side effects of Ceftin. Pt educated to take probiotic with antibiotic. Will send urine for culture and change antibiotics if  needed. Will consider sending pt to urology if she has another UTI in the future. Educated pt on wiping appropriately, showering and cleaning vaginal area well and wearing cotton underwear.       Follow Up {Instructions Charge Capture  Follow-up Communications :23}     Return if symptoms worsen or fail to improve.      Patient was given instructions and counseling regarding her condition or for health maintenance advice. Please see specific information pulled into the AVS if appropriate.    Dragon disclaimer:   Much of this encounter note is an electronic transcription/translation of spoken language to printed text. The electronic translation of spoken language may permit erroneous, or at times, nonsensical words or phrases to be inadvertently transcribed; Although I have reviewed the note for such errors, some may still exist.     Additional Patient Counseling:       Patient Instructions   Urinary Tract Infection, Adult  A urinary tract infection (UTI) is an infection of any part of the urinary tract. The urinary tract includes:  The kidneys.  The ureters.  The bladder.  The urethra.  These organs make, store, and get rid of pee (urine) in the body.  What are the causes?  This infection is caused by germs (bacteria) in your genital area. These germs grow and cause swelling (inflammation) of your urinary tract.  What increases the risk?  The following factors may make you more likely to develop this condition:  Using a small, thin tube (catheter) to drain pee.  Not being able to control when you pee or poop (incontinence).  Being female. If you are female, these things can increase the risk:  Using these methods to prevent pregnancy:  A medicine that kills sperm (spermicide).  A device that blocks sperm (diaphragm).  Having low levels of a female hormone (estrogen).  Being pregnant.  You are more likely to develop this condition if:  You have genes that add to your risk.  You are sexually active.  You take  antibiotic medicines.  You have trouble peeing because of:  A prostate that is bigger than normal, if you are male.  A blockage in the part of your body that drains pee from the bladder.  A kidney stone.  A nerve condition that affects your bladder.  Not getting enough to drink.  Not peeing often enough.  You have other conditions, such as:  Diabetes.  A weak disease-fighting system (immune system).  Sickle cell disease.  Gout.  Injury of the spine.  What are the signs or symptoms?  Symptoms of this condition include:  Needing to pee right away.  Peeing small amounts often.  Pain or burning when peeing.  Blood in the pee.  Pee that smells bad or not like normal.  Trouble peeing.  Pee that is cloudy.  Fluid coming from the vagina, if you are female.  Pain in the belly or lower back.  Other symptoms include:  Vomiting.  Not feeling hungry.  Feeling mixed up (confused). This may be the first symptom in older adults.  Being tired and grouchy (irritable).  A fever.  Watery poop (diarrhea).  How is this treated?  Taking antibiotic medicine.  Taking other medicines.  Drinking enough water.  In some cases, you may need to see a specialist.  Follow these instructions at home:  Medicines  Take over-the-counter and prescription medicines only as told by your doctor.  If you were prescribed an antibiotic medicine, take it as told by your doctor. Do not stop taking it even if you start to feel better.  General instructions  Make sure you:  Pee until your bladder is empty.  Do not hold pee for a long time.  Empty your bladder after sex.  Wipe from front to back after peeing or pooping if you are a female. Use each tissue one time when you wipe.  Drink enough fluid to keep your pee pale yellow.  Keep all follow-up visits.  Contact a doctor if:  You do not get better after 1-2 days.  Your symptoms go away and then come back.  Get help right away if:  You have very bad back pain.  You have very bad pain in your lower belly.  You  have a fever.  You have chills.  You feeling like you will vomit or you vomit.  Summary  A urinary tract infection (UTI) is an infection of any part of the urinary tract.  This condition is caused by germs in your genital area.  There are many risk factors for a UTI.  Treatment includes antibiotic medicines.  Drink enough fluid to keep your pee pale yellow.  This information is not intended to replace advice given to you by your health care provider. Make sure you discuss any questions you have with your health care provider.  Document Revised: 07/30/2021 Document Reviewed: 07/30/2021  [facility] Patient Education © 2022 [employer]

## 2023-06-16 NOTE — PATIENT INSTRUCTIONS
Urinary Tract Infection, Adult  A urinary tract infection (UTI) is an infection of any part of the urinary tract. The urinary tract includes:  The kidneys.  The ureters.  The bladder.  The urethra.  These organs make, store, and get rid of pee (urine) in the body.  What are the causes?  This infection is caused by germs (bacteria) in your genital area. These germs grow and cause swelling (inflammation) of your urinary tract.  What increases the risk?  The following factors may make you more likely to develop this condition:  Using a small, thin tube (catheter) to drain pee.  Not being able to control when you pee or poop (incontinence).  Being female. If you are female, these things can increase the risk:  Using these methods to prevent pregnancy:  A medicine that kills sperm (spermicide).  A device that blocks sperm (diaphragm).  Having low levels of a female hormone (estrogen).  Being pregnant.  You are more likely to develop this condition if:  You have genes that add to your risk.  You are sexually active.  You take antibiotic medicines.  You have trouble peeing because of:  A prostate that is bigger than normal, if you are male.  A blockage in the part of your body that drains pee from the bladder.  A kidney stone.  A nerve condition that affects your bladder.  Not getting enough to drink.  Not peeing often enough.  You have other conditions, such as:  Diabetes.  A weak disease-fighting system (immune system).  Sickle cell disease.  Gout.  Injury of the spine.  What are the signs or symptoms?  Symptoms of this condition include:  Needing to pee right away.  Peeing small amounts often.  Pain or burning when peeing.  Blood in the pee.  Pee that smells bad or not like normal.  Trouble peeing.  Pee that is cloudy.  Fluid coming from the vagina, if you are female.  Pain in the belly or lower back.  Other symptoms include:  Vomiting.  Not feeling hungry.  Feeling mixed up (confused). This may be the first symptom in  older adults.  Being tired and grouchy (irritable).  A fever.  Watery poop (diarrhea).  How is this treated?  Taking antibiotic medicine.  Taking other medicines.  Drinking enough water.  In some cases, you may need to see a specialist.  Follow these instructions at home:  Medicines  Take over-the-counter and prescription medicines only as told by your doctor.  If you were prescribed an antibiotic medicine, take it as told by your doctor. Do not stop taking it even if you start to feel better.  General instructions  Make sure you:  Pee until your bladder is empty.  Do not hold pee for a long time.  Empty your bladder after sex.  Wipe from front to back after peeing or pooping if you are a female. Use each tissue one time when you wipe.  Drink enough fluid to keep your pee pale yellow.  Keep all follow-up visits.  Contact a doctor if:  You do not get better after 1-2 days.  Your symptoms go away and then come back.  Get help right away if:  You have very bad back pain.  You have very bad pain in your lower belly.  You have a fever.  You have chills.  You feeling like you will vomit or you vomit.  Summary  A urinary tract infection (UTI) is an infection of any part of the urinary tract.  This condition is caused by germs in your genital area.  There are many risk factors for a UTI.  Treatment includes antibiotic medicines.  Drink enough fluid to keep your pee pale yellow.  This information is not intended to replace advice given to you by your health care provider. Make sure you discuss any questions you have with your health care provider.  Document Revised: 07/30/2021 Document Reviewed: 07/30/2021  Elsevier Patient Education © 2022 [employer].

## 2023-06-18 LAB
BACTERIA UR CULT: NO GROWTH
BACTERIA UR CULT: NORMAL

## 2023-06-19 ENCOUNTER — TELEPHONE (OUTPATIENT)
Dept: INTERNAL MEDICINE | Facility: CLINIC | Age: 88
End: 2023-06-19
Payer: MEDICARE

## 2023-06-19 NOTE — TELEPHONE ENCOUNTER
Called patient's number and spoke with patient's granddaughter.  Granddaughter was with patient at last appointment.  Educated to granddaughter that patient's urine culture came back as no growth.  Discussed with the granddaughter that I would still like patient to continue taking antibiotic at this time due to the poor sample we were able to obtain at patient's visit.  Granddaughter states patient is feeling better and is not having as much urine frequency.  Granddaughter educated to bring patient back if symptoms persist or worsen after antibiotic therapy.  At this time we would want to get another sample to determine if UTI still present and what bacteria is growing.  Granddaughter requests that grandmother give sample at home due to her inability to urinate on command in the office.  Educated granddaughter to reach out via telephone or [employer] at that time and we will give specific instructions.

## 2023-06-20 NOTE — TELEPHONE ENCOUNTER
Called granddaughter back and she is agreed, taking her [facility]. She stated thanks for all our help.

## 2023-06-20 NOTE — TELEPHONE ENCOUNTER
Granddaughter calling back and patient is worse. Patient states to granddaughter that the burning has increased and feels more frequent but can not void and did not think there was blood in the urine be granddaughter has not actually been to her house to see. Granddaughter states she is not sure how much she is urinating. Not sure of fever. ABX is making her sick to her stomach and is taking with food and probiotic. Granddaughter states patient is not talking lethargic as of this afternoon. Please advise.

## 2023-06-23 PROBLEM — R30.0 DYSURIA: Status: ACTIVE | Noted: 2023-06-23

## 2023-07-11 PROBLEM — R09.02 OXYGEN DESATURATION: Status: ACTIVE | Noted: 2023-01-01

## 2023-07-11 PROBLEM — S72.002A CLOSED LEFT HIP FRACTURE: Status: RESOLVED | Noted: 2023-01-01 | Resolved: 2023-01-01

## 2023-07-11 PROBLEM — S72.002A CLOSED LEFT HIP FRACTURE: Status: ACTIVE | Noted: 2023-01-01

## 2023-07-11 PROBLEM — I50.9 CHF (CONGESTIVE HEART FAILURE): Status: ACTIVE | Noted: 2023-01-01

## 2023-07-11 NOTE — ED PROVIDER NOTES
" EMERGENCY DEPARTMENT ENCOUNTER    Room Number:  N531/1  Date seen:  7/12/2023  PCP: Pam Charles APRN  Historian: Patient and family at bedside  Relevant information provided by sources other than the patient, review of external records, and social determinants of health may be included in the HPI section.      HPI:  Chief Complaint: Dyspnea  Additional historical features obtained directly from: Patient's daughter states that they were in the office with her PCP today and she did not like the way she was breathing and so she had to come into the emergency department for evaluation.  Context: Marilynn Gil is a 88 y.o. female who presents to the ED c/o very pleasant 88-year-old female who presented the emergency department via the primary care physician office today with complaints of increased shortness of breath, swelling and \"junky sounding\" since at the PCP office today.        Initial impression including social determinants which will impact the assessment patient does not carry a diagnosis of CHF or COPD according to her, and I do not see any cardiology notes or pulmonary notes in her old record.  Does appear to have diabetes.  She does appear to be in mild distress with lower extremity edema and suspect has some volume overload so she will likely need admission since she is hypoxic          PAST MEDICAL HISTORY  Active Ambulatory Problems     Diagnosis Date Noted    Insomnia 09/01/2017    Dysphagia 10/11/2017    Polyneuropathy 03/19/2018    Essential tremor 03/19/2018    Acute pain of left knee 03/21/2022    Chronic left shoulder pain 08/10/2022    Gastroesophageal reflux disease without esophagitis 08/10/2022    History of DVT (deep vein thrombosis) 06/23/2022    Type II diabetes mellitus with renal manifestations 11/14/2022    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 11/14/2022    Type 2 diabetes mellitus with hyperglycemia 11/14/2022    Mixed hyperlipidemia 11/14/2022    Essential " hypertension, benign 11/14/2022    CRI (chronic renal insufficiency), stage 4 (severe) 11/14/2022    Personal history of other venous thrombosis and embolism 06/23/2022    Femoral fracture 04/12/2023    Chronic bronchitis 04/12/2023    Dysuria 06/23/2023    Oxygen desaturation 07/11/2023     Resolved Ambulatory Problems     Diagnosis Date Noted    Chronic recurrent pancreatitis (HCC)     Hypertension     Hypercholesterolemia     CAD in native artery     CKD (chronic kidney disease) stage 4, GFR 15-29 ml/min     Impaired fasting glucose 10/11/2022    Medicare annual wellness visit, subsequent 10/11/2022     Past Medical History:   Diagnosis Date    Anxiety     CAD (coronary artery disease) 1985    CVA (cerebral vascular accident)     GERD (gastroesophageal reflux disease)     Impaired glucose metabolism     Mesenteric ischemia, chronic     Myocardial infarct 2012    Neuropathy     Osteoporosis     Pancreatitis     Venous thrombosis and embolism          PAST SURGICAL HISTORY  Past Surgical History:   Procedure Laterality Date    BACK SURGERY      x 4    BREAST MASS EXCISION      Benign    CATARACT EXTRACTION Bilateral     CHOLECYSTECTOMY  08/2013    COLONOSCOPY  12/2012    scarred mucosa,internal hemorrhoids    COLONOSCOPY  09/12/2012    Patent side to side ileo colonic anastomosis, one 7mm polyp in the ascending colon, erythematous and vascular pattern decreased mucosa in the transverse colon, scarred mucosa in the rectum, NBIH.  PATH: Tubular adenoma, Patchy mild chronic inflammation     CORONARY ANGIOPLASTY      MI 1985, s/p angioplasty 1987    ENDOSCOPY  12/06/2013    z line irreg 38cm from the incisors, tortuous esophagus, HH, gastritis, bilious gastric fluid, duodenitis.  PATH: Gastric mucosa with minimal chornic infalmmation and with histologic features suggestive of reactive gastropathy.     PANCREAS SURGERY  05/2013    Pancreatic duct stricture, stent placed    RECTAL PROLAPSE REPAIR  2007    Rectopexy and  sigmoidectomy, s/p rectal prolapse repair x 2 prior to 2007    SPHINCTEROTOMY  2013         FAMILY HISTORY  Family History   Problem Relation Age of Onset    Cancer Brother     Pancreatitis Paternal Aunt     Diabetes Father          SOCIAL HISTORY  Social History     Socioeconomic History    Marital status:     Number of children: 2   Tobacco Use    Smoking status: Former     Packs/day: 2.00     Years: 20.00     Pack years: 40.00     Types: Cigarettes     Quit date:      Years since quittin.5    Smokeless tobacco: Never   Vaping Use    Vaping Use: Never used   Substance and Sexual Activity    Alcohol use: No    Drug use: No    Sexual activity: Defer         ALLERGIES  Codeine, Gabapentin, Lyrica [pregabalin], Other, Shellfish-derived products, and Sulfa antibiotics          PHYSICAL EXAM  ED Triage Vitals [23 1118]   Temp Heart Rate Resp BP SpO2   97.4 °F (36.3 °C) 64 22 -- 92 %      Temp src Heart Rate Source Patient Position BP Location FiO2 (%)   Tympanic -- -- -- --         Physical Exam      GENERAL: Awake and alert, very pleasant and in mild respiratory distress  HENT: nares patent  EYES: no scleral icterus, PERRL, EOMI  CV: regular rhythm, normal rate, distal pulses symmetric and palpable  RESPIRATORY: Tachypneic with rhonchi throughout and decreased air exchange  ABDOMEN: soft, nondistended nontender with normal bowel sound  MUSCULOSKELETAL: no deformity, 1+ pedal edema with the left slightly larger than the right  NEURO: alert, moves all extremities, follows commands  PSYCH:  calm, cooperative  SKIN: warm, dry with no rash        LAB RESULTS  Recent Results (from the past 24 hour(s))   ECG 12 Lead Rhythm Change    Collection Time: 23  6:11 PM   Result Value Ref Range    QT Interval 351 ms   POC Glucose Once    Collection Time: 23  9:52 PM    Specimen: Blood   Result Value Ref Range    Glucose 115 70 - 130 mg/dL   High Sensitivity Troponin T    Collection Time: 23  "10:05 PM    Specimen: Blood   Result Value Ref Range    HS Troponin T 35 (H) <10 ng/L   High Sensitivity Troponin T 2Hr    Collection Time: 07/12/23 12:12 AM    Specimen: Blood   Result Value Ref Range    HS Troponin T 43 (H) <10 ng/L    Troponin T Delta 8 (C) >=-4 - <+4 ng/L   ECG 12 Lead Other; elevated troponin    Collection Time: 07/12/23  3:34 AM   Result Value Ref Range    QT Interval 399 ms   Basic Metabolic Panel    Collection Time: 07/12/23  4:24 AM    Specimen: Blood   Result Value Ref Range    Glucose 99 65 - 99 mg/dL    BUN 31 (H) 8 - 23 mg/dL    Creatinine 1.76 (H) 0.57 - 1.00 mg/dL    Sodium 144 136 - 145 mmol/L    Potassium 4.0 3.5 - 5.2 mmol/L    Chloride 102 98 - 107 mmol/L    CO2 29.1 (H) 22.0 - 29.0 mmol/L    Calcium 9.2 8.6 - 10.5 mg/dL    BUN/Creatinine Ratio 17.6 7.0 - 25.0    Anion Gap 12.9 5.0 - 15.0 mmol/L    eGFR 27.9 (L) >60.0 mL/min/1.73   CBC (No Diff)    Collection Time: 07/12/23  4:24 AM    Specimen: Blood   Result Value Ref Range    WBC 6.01 3.40 - 10.80 10*3/mm3    RBC 4.16 3.77 - 5.28 10*6/mm3    Hemoglobin 10.4 (L) 12.0 - 15.9 g/dL    Hematocrit 33.6 (L) 34.0 - 46.6 %    MCV 80.8 79.0 - 97.0 fL    MCH 25.0 (L) 26.6 - 33.0 pg    MCHC 31.0 (L) 31.5 - 35.7 g/dL    RDW 15.8 (H) 12.3 - 15.4 %    RDW-SD 46.5 37.0 - 54.0 fl    MPV 8.8 6.0 - 12.0 fL    Platelets 187 140 - 450 10*3/mm3   High Sensitivity Troponin T    Collection Time: 07/12/23  4:24 AM    Specimen: Blood   Result Value Ref Range    HS Troponin T 44 (H) <10 ng/L       Ordered the above labs and my independent interpretation of these results are discussed in the section labeled \"ED course\" below        RADIOLOGY  CT Cervical Spine Without Contrast    Result Date: 7/12/2023  CT CERVICAL SPINE WITHOUT CONTRAST  HISTORY: Fall, neck pain.  COMPARISON: CT cervical spine 04/05/2018  FINDINGS: There is a grade 1 anterolisthesis of C4 upon C5 estimated to be approximately 2 mm. There is moderate loss of disc height at C4-5 and C5-6. " "There is no evidence of prevertebral edema.  C2-3: A small central disc osteophyte complex is present with no evidence of herniation. Moderate facet degenerative disease is present on the right.  C3-4: Mild facet degenerative disease is present bilaterally.  C4-5: Moderate to severe facet degenerative disease is present on the left.  C5-6: A mild central disc osteophyte complex is present with no evidence of herniation. Moderate to severe foraminal stenosis is present on the right secondary to uncovertebral degenerative disease.  C6-7: There is no evidence of disc bulge or herniation.  C7-T1: There is no evidence of disc bulge or herniation.      Multilevel degenerative disease involving the cervical spine is noted as described above with no evidence of fracture. Further evaluation could be performed with a MRI examination of the cervical spine as indicated.    Radiation dose reduction techniques were utilized, including automated exposure control and exposure modulation based on body size.        Ordered the above noted radiological studies.  Independently interpreted by me in PACS.  My independent interpretation of these results are discussed in the section below labeled \"ED course\"        PROCEDURES  Procedures          MEDICATIONS GIVEN IN ER  Medications   sodium chloride 0.9 % flush 10 mL (has no administration in time range)   ondansetron (ZOFRAN) tablet 4 mg ( Oral Not Given:  See Alt 7/12/23 0751)     Or   ondansetron (ZOFRAN) injection 4 mg (4 mg Intravenous Given 7/12/23 0751)   acetaminophen (TYLENOL) tablet 650 mg (650 mg Oral Given 7/11/23 9184)   sennosides-docusate (PERICOLACE) 8.6-50 MG per tablet 2 tablet (2 tablets Oral Not Given 7/12/23 5204)     And   polyethylene glycol (MIRALAX) packet 17 g (has no administration in time range)     And   bisacodyl (DULCOLAX) EC tablet 5 mg (has no administration in time range)     And   bisacodyl (DULCOLAX) suppository 10 mg (has no administration in time " "range)   melatonin tablet 3 mg (3 mg Oral Given 7/11/23 5803)   HYDROcodone-acetaminophen (NORCO) 5-325 MG per tablet 1 tablet (1 tablet Oral Given 7/12/23 0642)   dextrose (GLUTOSE) oral gel 15 g (has no administration in time range)   dextrose (D50W) (25 g/50 mL) IV injection 25 g (has no administration in time range)   glucagon (GLUCAGEN) injection 1 mg (has no administration in time range)   insulin lispro (HUMALOG/ADMELOG) injection 2-9 Units (2 Units Subcutaneous Not Given 7/12/23 1159)   albuterol (PROVENTIL) nebulizer solution 0.083% 2.5 mg/3mL (has no administration in time range)   aspirin chewable tablet 81 mg (81 mg Oral Given 7/12/23 0815)   isosorbide mononitrate (IMDUR) 24 hr tablet 60 mg (60 mg Oral Given 7/12/23 0815)   lisinopril (PRINIVIL,ZESTRIL) tablet 5 mg (5 mg Oral Given 7/12/23 0815)   metoprolol tartrate (LOPRESSOR) tablet 25 mg (25 mg Oral Given 7/12/23 0815)   pantoprazole (PROTONIX) EC tablet 40 mg (40 mg Oral Given 7/12/23 0815)   prasugrel (EFFIENT) tablet 5 mg (5 mg Oral Given 7/12/23 0815)   rosuvastatin (CRESTOR) tablet 10 mg (10 mg Oral Given 7/12/23 0815)   furosemide (LASIX) injection 40 mg (40 mg Intravenous Given 7/12/23 0908)   HYDROmorphone (DILAUDID) injection 0.5 mg (0.5 mg Intravenous Given 7/12/23 1208)   promethazine (PHENERGAN) tablet 12.5 mg (12.5 mg Oral Given 7/12/23 1050)     Or   promethazine (PHENERGAN) suppository 12.5 mg ( Rectal Not Given:  See Alt 7/12/23 1050)   ipratropium-albuterol (DUO-NEB) nebulizer solution 3 mL (3 mL Nebulization Given 7/11/23 1206)   bumetanide (BUMEX) injection 2 mg (2 mg Intravenous Given 7/11/23 1457)                   MEDICAL DECISION MAKING and INDEPENDENT INTERPRETATIONS      Everything documented below this statement is the \"ED course\" section which I have referred to above.  Everything discussed in the following section constitutes MY INDEPENDENT INTERPRETATIONS of the lab work, EKGs, and radiology studies, and all of my " "personal conversations with other providers, and all of my independent decision making regarding this patient are discussed below.      ED Course as of 07/12/23 1347   Wed Jul 12, 2023   1345 CBC shows normal white blood cell count hemoglobin is down to 10 from baseline around 12 when last checked [DP]   1345 She also appears to have stable, moderate chronic kidney disease [DP]   1345 Cole viral panel is negative [DP]   1345 Troponin and proBNP are both elevated, but some of that may be due to the chronic kidney disease. [DP]   1345 Chest x-ray appears to show some vascular congestion [DP]   1346 EKG on arrival shows sinus rhythm at 65  Normal ME and QT  No acute ischemic changes and no previous EKG to review in epic surprisingly [DP]   1346 Based on the evaluation, I think the patient has acute congestive heart failure with no previous history.  She also has moderate chronic kidney disease and will likely need IV diuresis.  I have given her a dose of IV Bumex in the ED and I spoke with Dr. Neri who agrees to admit to a telemetry bed for further and cardiology consult/nephrology consult if indicated [DP]   1347 She was 87% on room air on my exam [DP]      ED Course User Index  [DP] Carlton España MD         Everything documented above with this statement, in the ED course section constitutes my independent interpretation of lab work EKG and radiology studies along with my personal conversations with other providers and my independent medical decision making.  This statement will not be repeated before each data point, but they are all my independent interpretation needs contained within the \"ED course\" section.             All appropriate hygiene and PPE requirements were satisfied with this patient encounter      FINAL DIAGNOSES  Final diagnoses:   Acute congestive heart failure, unspecified heart failure type   Hypoxia           DISPOSITION  Admit to telemetry          Latest Documented Vital Signs:  As of " 13:47 EDT  BP- 124/69 HR- 74 Temp- 99 °F (37.2 °C) (Oral) O2 sat- 91%        --    Please note that portions of this were completed with a voice recognition program.       Note Disclaimer: At [facility], we believe that sharing information builds trust and better relationships. You are receiving this note because you are receiving care at [facility] or recently visited. It is possible you will see health information before a provider has talked with you about it. This kind of information can be easy to misunderstand. To help you fully understand what it      Carlton España MD  07/12/23 5643

## 2023-07-11 NOTE — ED TRIAGE NOTES
Patient to ed from PCP office upstairs with complaints of low O2 sats in PCP office of 85% and wheezing.

## 2023-07-11 NOTE — ED NOTES
Nursing report ED to floor  Marilynn Gil  88 y.o.  female    HPI :   Chief Complaint   Patient presents with    Shortness of Breath       Admitting doctor:   Lacey Garibay MD    Admitting diagnosis:   The primary encounter diagnosis was Acute congestive heart failure, unspecified heart failure type. A diagnosis of Hypoxia was also pertinent to this visit.    Code status:   Current Code Status       Date Active Code Status Order ID Comments User Context       Prior            Allergies:   Codeine, Gabapentin, Lyrica [pregabalin], Other, Shellfish-derived products, and Sulfa antibiotics    Isolation:   Enhanced Droplet/Contact     Intake and Output  No intake or output data in the 24 hours ending 07/11/23 1512    Weight:       07/11/23  1135   Weight: 47.2 kg (104 lb)       Most recent vitals:   Vitals:    07/11/23 1241 07/11/23 1331 07/11/23 1345 07/11/23 1457   BP:  134/73  151/67   BP Location:       Patient Position:       Pulse: 77 80 80 77   Resp:       Temp:       TempSrc:       SpO2: 90% 97% 98%    Weight:       Height:           Active LDAs/IV Access:   Lines, Drains & Airways       Active LDAs       Name Placement date Placement time Site Days    Peripheral IV 07/11/23 1233 Right Antecubital 07/11/23  1233  Antecubital  less than 1                    Labs (abnormal labs have a star):   Labs Reviewed   COMPREHENSIVE METABOLIC PANEL - Abnormal; Notable for the following components:       Result Value    Glucose 108 (*)     BUN 28 (*)     Creatinine 1.86 (*)     Alkaline Phosphatase 136 (*)     eGFR 26.1 (*)     All other components within normal limits    Narrative:     GFR Normal >60  Chronic Kidney Disease <60  Kidney Failure <15    The GFR formula is only valid for adults with stable renal function between ages 18 and 70.   BNP (IN-HOUSE) - Abnormal; Notable for the following components:    proBNP 3,635.0 (*)     All other components within normal limits    Narrative:     Among patients with  dyspnea, NT-proBNP is highly sensitive for the detection of acute congestive heart failure. In addition NT-proBNP of <300 pg/ml effectively rules out acute congestive heart failure with 99% negative predictive value.    Results may be falsely decreased if patient taking Biotin.     SINGLE HSTROPONIN T - Abnormal; Notable for the following components:    HS Troponin T 40 (*)     All other components within normal limits    Narrative:     High Sensitive Troponin T Reference Range:  <10.0 ng/L- Negative Female for AMI  <15.0 ng/L- Negative Male for AMI  >=10 - Abnormal Female indicating possible myocardial injury.  >=15 - Abnormal Male indicating possible myocardial injury.   Clinicians would have to utilize clinical acumen, EKG, Troponin, and serial changes to determine if it is an Acute Myocardial Infarction or myocardial injury due to an underlying chronic condition.        CBC WITH AUTO DIFFERENTIAL - Abnormal; Notable for the following components:    Hemoglobin 10.4 (*)     MCH 24.9 (*)     MCHC 30.3 (*)     RDW 15.8 (*)     All other components within normal limits   RESPIRATORY PANEL PCR W/ COVID-19 (SARS-COV-2) RICARDA/JACK/KRUNAL/PAD/COR/MAD/CANDICE IN-HOUSE, NP SWAB IN [facility], 3-4 HR TAT - Normal    Narrative:     In the setting of a positive respiratory panel with a viral infection PLUS a negative procalcitonin without other underlying concern for bacterial infection, consider observing off antibiotics or discontinuation of antibiotics and continue supportive care. If the respiratory panel is positive for atypical bacterial infection (Bordetella pertussis, Chlamydophila pneumoniae, or Mycoplasma pneumoniae), consider antibiotic de-escalation to target atypical bacterial infection.   PROTIME-INR - Normal   PROCALCITONIN - Normal    Narrative:     As a Marker for Sepsis (Non-Neonates):    1. <0.5 ng/mL represents a low risk of severe sepsis and/or septic shock.  2. >2 ng/mL represents a high risk of severe sepsis and/or  "septic shock.    As a Marker for Lower Respiratory Tract Infections that require antibiotic therapy:    PCT on Admission    Antibiotic Therapy       6-12 Hrs later    >0.5                Strongly Recommended  >0.25 - <0.5        Recommended   0.1 - 0.25          Discouraged              Remeasure/reassess PCT  <0.1                Strongly Discouraged     Remeasure/reassess PCT    As 28 day mortality risk marker: \"Change in Procalcitonin Result\" (>80% or <=80%) if Day 0 (or Day 1) and Day 4 values are available. Refer to http://[email or web address]-pct-[email or web address]    Change in PCT <=80%  A decrease of PCT levels below or equal to 80% defines a positive change in PCT test result representing a higher risk for 28-day all-cause mortality of patients diagnosed with severe sepsis for septic shock.    Change in PCT >80%  A decrease of PCT levels of more than 80% defines a negative change in PCT result representing a lower risk for 28-day all-cause mortality of patients diagnosed with severe sepsis or septic shock.      CBC AND DIFFERENTIAL    Narrative:     The following orders were created for panel order CBC & Differential.  Procedure                               Abnormality         Status                     ---------                               -----------         ------                     CBC Auto Differential[[id number]]        Abnormal            Final result                 Please view results for these tests on the individual orders.       EKG:   ECG 12 Lead Dyspnea    (Results Pending)       Meds given in ED:   Medications   sodium chloride 0.9 % flush 10 mL (has no administration in time range)   ipratropium-albuterol (DUO-NEB) nebulizer solution 3 mL (3 mL Nebulization Given 7/11/23 1206)   bumetanide (BUMEX) injection 2 mg (2 mg Intravenous Given 7/11/23 3413)       Imaging results:  XR Chest 1 View    Result Date: 7/11/2023  Minimal likely atelectasis at the bases. Cardiomegaly with mild prominence of " interstitial markings. Tortuous aorta.  This report was finalized on 2023 12:18 PM by Dr. Amilcar Marquez M.D.       Ambulatory status:   - Up x's 1 assist    Social issues:   Social History     Socioeconomic History    Marital status:     Number of children: 2   Tobacco Use    Smoking status: Former     Packs/day: 2.00     Years: 20.00     Pack years: 40.00     Types: Cigarettes     Quit date:      Years since quittin.5    Smokeless tobacco: Never   Substance and Sexual Activity    Alcohol use: No    Drug use: No    Sexual activity: Defer       NIH Stroke Scale:       Rose Riojas RN  23 15:12 EDT

## 2023-07-11 NOTE — PROGRESS NOTES
Return consult call they were wanting a consult for cervical spine.  Instructed to call neurosurgery.  Patient's nurse understood and agreed.  If there is any additional orthopedic issues please let us know happy to see the patient if needed.

## 2023-07-12 PROBLEM — M50.30 DDD (DEGENERATIVE DISC DISEASE), CERVICAL: Status: ACTIVE | Noted: 2023-01-01

## 2023-07-12 PROBLEM — M54.2 CERVICAL PAIN (NECK): Status: ACTIVE | Noted: 2023-01-01

## 2023-07-12 NOTE — SIGNIFICANT NOTE
07/12/23 0941   Rehab Time/Intention   Session Not Performed unable to treat, medical status change  (Awaiting GIOVANY consult. Discussed with RN and will follow up as time allows.)   Recommendation   PT - Next Appointment 07/13/23

## 2023-07-12 NOTE — SIGNIFICANT NOTE
07/12/23 1411   OTHER   Discipline occupational therapist   Rehab Time/Intention   Session Not Performed other (see comments)  (awaiting GIOVANY consult for neck pain, will f/u once cleared by [facility] for therapy evals.)   Recommendation   OT - Next Appointment 07/13/23

## 2023-07-12 NOTE — PLAN OF CARE
Goal Outcome Evaluation: patient met in bed on taking over in pain. She was medicated  with good effect. A was paged about her ivf, suggestion was made to discontinue given her diagnosis of CHF, order has been carried out successfully. Her troponin at midnight was 43 with Delta of 8, which lab called to notify at 0105. A was paged at 0110, call back was received and nothing was ordered. On examination of the patient, she is asymptomatic, no chest pain or discomfort. On the monitor, no ST elevation just a couple PVC with normal heart rate. Stat EKG done, which suggests right atrial enlargement, abnormal waves and old anterior infarct were detected. [facility] paged about the otcome of the result.

## 2023-07-12 NOTE — CONSULTS
Marilynn Gil   88 y.o.  female    LOS: 1 day   Patient Care Team:  Pam Charles APRN as PCP - General (Internal Medicine)  Joycelyn Zapata MD (Inactive) as PCP - Family Medicine      Subjective     Patient Complaints:  hypoxia    History of Present Illness:   The patient is an 88 year old female known to Dr. Valerio.  PMH includes:  HLD, cervical fracture 2018, left femur fx 2022 (treat conservatively), dizziness, DVT, chronic pancreatitis, ICM/ chronic systolic CHF and gerd.      The patient presented to [facility] ER from PCP's office for SOA, hypoxia and wheezing.  Her o2 was 86-90%.  .  She had  complaints of neck and left shoulder pain. Endorses swelling in her left foot. . Patient reports she did fall on her right side. Reports her bruising has started healing on her right arm.     Cardiac History:  Cardiac catheterization in 1987 showed a large anteroapical dyskinetic segment with total occlusion. The LAD was stented.      2009 and a catheterization. Ejection fraction was 40%. There was no discrete clot in her apex, which had been noted years ago. There was an 80 percent circumflex stenosis, which was stented. She had a previous LAD PTCA, which was patent.     2013 echo the ejection fraction was 30-35% with impaired relaxation,akinetic apex, and possible apical thrombus.     Cardiolite study March 5, 2014 showed no stress-induced ischemia. Ejection fraction of 25% with anteroapical and septal infarction.     Refuses defibrillator    Review of Systems:   Soa, neck and shoulder pains, wh, fall    Medication Review:   Prior to Admission medications    Medication Sig Start Date End Date Taking? Authorizing Provider   Acetaminophen (TYLENOL PO) Take 500 mg by mouth Every 6 (Six) Hours As Needed (pain). 3/18/22  Yes ProviderPeter MD   albuterol sulfate  (90 Base) MCG/ACT inhaler Inhale 2 puffs Every 4 (Four) Hours As Needed for Wheezing. 4/12/23  Yes Pam Charles APRN   aspirin 81 MG chewable tablet  Chew 1 tablet Daily.   Yes ProviderPeter MD   Diclofenac Sodium (VOLTAREN) 1 % gel gel Apply 4 g topically to the appropriate area as directed 4 (Four) Times a Day As Needed (pain). 3/21/22  Yes Pam Charles APRN   furosemide (LASIX) 20 MG tablet Take 1 tablet by mouth Daily. 4/12/23  Yes Pam Charles APRN   isosorbide mononitrate (IMDUR) 60 MG 24 hr tablet 1 tablet Daily.   Yes ProviderPeter MD   lisinopril (PRINIVIL,ZESTRIL) 5 MG tablet Take 1 tablet by mouth Daily.   Yes ProviderPeter MD   metoprolol tartrate (LOPRESSOR) 25 MG tablet Take 1 tablet by mouth 2 (Two) Times a Day. 4/12/23  Yes Pam Charles APRN   NITROSTAT 0.4 MG SL tablet 1 tab sublingual every 5 minutes x 3 as needed for chest pressure 2/24/16  Yes Joycelyn Zapata MD   pantoprazole (PROTONIX) 40 MG EC tablet Take 1 tablet by mouth Daily. 4/12/23  Yes Pam Charles APRN   prasugrel (EFFIENT) tablet Take 1 tablet by mouth Daily.   Yes ProviderPeter MD   rosuvastatin (CRESTOR) 10 MG tablet Take 1 tablet by mouth Daily. 4/12/23  Yes Pam Charles APRN   Vibegron (GEMTESA PO) Take  by mouth Daily.   Yes ProviderPeter MD   zolpidem (AMBIEN) 10 MG tablet TAKE 1 TABLET BY MOUTH AT NIGHT AS NEEDED FOR SLEEP 4/17/23  Yes Pam Charles APRN   phenazopyridine (Pyridium) 200 MG tablet Take 1 tablet by mouth 3 (Three) Times a Day As Needed for Bladder Spasms. 6/23/23   Pam Charles APRN   potassium chloride (K-DUR,KLOR-CON) 10 MEQ CR tablet Take 1 tablet by mouth Daily.  Patient not taking: Reported on 6/23/2023 4/12/23   Pam Charles APRN   traMADol (ULTRAM) 50 MG tablet Take 1 tablet by mouth Every 4 (Four) Hours As Needed for Moderate Pain. 2/24/23   Vishal Adamson MD         Family History   Problem Relation Age of Onset    Cancer Brother     Pancreatitis Paternal Aunt     Diabetes Father      Social History     Socioeconomic History    Marital status:     Number of children: 2   Tobacco  Use    Smoking status: Former     Packs/day: 2.00     Years: 20.00     Pack years: 40.00     Types: Cigarettes     Quit date:      Years since quittin.5    Smokeless tobacco: Never   Vaping Use    Vaping Use: Never used   Substance and Sexual Activity    Alcohol use: No    Drug use: No    Sexual activity: Defer     Objective   Past Surgical History:   Procedure Laterality Date    BACK SURGERY      x 4    BREAST MASS EXCISION      Benign    CATARACT EXTRACTION Bilateral     CHOLECYSTECTOMY  2013    COLONOSCOPY  2012    scarred mucosa,internal hemorrhoids    COLONOSCOPY  2012    Patent side to side ileo colonic anastomosis, one 7mm polyp in the ascending colon, erythematous and vascular pattern decreased mucosa in the transverse colon, scarred mucosa in the rectum, NBIH.  PATH: Tubular adenoma, Patchy mild chronic inflammation     CORONARY ANGIOPLASTY      MI , s/p angioplasty     ENDOSCOPY  2013    z line irreg 38cm from the incisors, tortuous esophagus, HH, gastritis, bilious gastric fluid, duodenitis.  PATH: Gastric mucosa with minimal chornic infalmmation and with histologic features suggestive of reactive gastropathy.     PANCREAS SURGERY  2013    Pancreatic duct stricture, stent placed    RECTAL PROLAPSE REPAIR      Rectopexy and sigmoidectomy, s/p rectal prolapse repair x 2 prior to 2007    SPHINCTEROTOMY  2013     Past Medical History:   Diagnosis Date    Anxiety     CAD (coronary artery disease)     estimated time frame    CRI (chronic renal insufficiency), stage 4 (severe)     CVA (cerebral vascular accident)     Essential hypertension, benign     Essential tremor     GERD (gastroesophageal reflux disease)     History of DVT (deep vein thrombosis)     Impaired glucose metabolism     unclear how long this has been present, but only as of  did pt meet dx criteria for DM T2    Insomnia     Mesenteric ischemia, chronic     Mixed hyperlipidemia     Myocardial  infarct 2012    Neuropathy     both upper ext mainly hands after neck fx    Osteoporosis     Pancreatitis     hx of    Type 2 diabetes mellitus with hyperglycemia 2022    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    CAD predates DM by decades    Type II diabetes mellitus with renal manifestations 2010    estimated time frame, precedes DM by many years    Venous thrombosis and embolism        Vital Sign Min/Max for last 24 hours  Temp  Min: 97.4 °F (36.3 °C)  Max: 99 °F (37.2 °C)   BP  Min: 105/64  Max: 161/88    Pulse  Min: 64  Max: 92         07/11/23  1135 07/12/23  0518   Weight: 47.2 kg (104 lb) 44.9 kg (99 lb)        Physical Exam:      General Appearance:    No acute distress                                Head:    Normocephalic, without obvious abnormality, atraumatic   Neck:   No adenopathy, supple, trachea midline, no carotid bruit, no JVD   Lungs:     Wh ,respirations regular, even and              unlabored    Heart:    Regular rhythm and normal rate, normal S1 and S2,            _ + murmur, no gallop, no rub, no click   Abdomen:     Normal bowel sounds, no masses, no organomegaly, soft   non-tender, non-distended,    Extremities:   No edema. Moves all extremities well, no cyanosis, no erythema   Pulses:   Pulses palpable and equal bilaterally   Skin:   No bleeding, bruising or rash   Neurologic:   Alert and oriented x3,  no obvious focal deficits          Results Review:     I reviewed the patient's new clinical results.      Sodium   Date Value Ref Range Status   07/12/2023 144 136 - 145 mmol/L Final   07/11/2023 142 136 - 145 mmol/L Final     Potassium   Date Value Ref Range Status   07/12/2023 4.0 3.5 - 5.2 mmol/L Final   07/11/2023 4.1 3.5 - 5.2 mmol/L Final     Chloride   Date Value Ref Range Status   07/12/2023 102 98 - 107 mmol/L Final   07/11/2023 103 98 - 107 mmol/L Final     No results found for: PLASMABICARB  BUN   Date Value Ref Range Status   07/12/2023 31 (H) 8 - 23 mg/dL Final    07/11/2023 28 (H) 8 - 23 mg/dL Final     Creatinine   Date Value Ref Range Status   07/12/2023 1.76 (H) 0.57 - 1.00 mg/dL Final   07/11/2023 1.86 (H) 0.57 - 1.00 mg/dL Final     Calcium   Date Value Ref Range Status   07/12/2023 9.2 8.6 - 10.5 mg/dL Final   07/11/2023 9.2 8.6 - 10.5 mg/dL Final     No results found for: MG    Results from last 7 days   Lab Units 07/12/23  0424   WBC 10*3/mm3 6.01   HEMOGLOBIN g/dL 10.4*   HEMATOCRIT % 33.6*   PLATELETS 10*3/mm3 187     Lab Results   Component Value Date    CHOL 249 (H) 10/02/2018    CHOL 232 (H) 05/17/2018    CHOL 221 (H) 01/10/2018     Lab Results   Component Value Date    HDL 57 04/12/2023    HDL 67 (H) 10/11/2022    HDL 65 12/14/2021     Lab Results   Component Value Date     (H) 04/12/2023     (H) 10/11/2022    LDL 86 12/14/2021     Lab Results   Component Value Date    TRIG 243 (H) 04/12/2023    TRIG 161 (H) 10/11/2022    TRIG 125 12/14/2021     Results from last 7 days   Lab Units 07/11/23  1236   INR  0.93     Results from last 7 days   Lab Units 07/12/23  0424 07/12/23  0012 07/11/23  2205 07/11/23  1236   HSTROP T ng/L 44* 43* 35* 40*     Results from last 7 days   Lab Units 07/11/23  1236   PROBNP pg/mL 3,635.0*                 Assessment & Plan     ICM  2. Acute on chronic HFrEF    A. 2009 and a catheterization. Ejection fraction was 40%. There was no discrete clot in her apex, which had been noted years ago. There was an 80 percent circumflex stenosis, which was stented. She had a previous LAD PTCA, which was patent.    B. Per office note- refuses defibrillation  3. CAD  4. Hypoxia  5. Falls   6. Neck and shoulder pain  7. HLD  8. DDD  9. Chronic pancreatitis       Check Echo.     Patient seen and examined  Will review echo IV diuresis today  To be seen by neuro surgery

## 2023-07-12 NOTE — PROGRESS NOTES
Name: Marilynn Gil ADMIT: 2023   : 1935  PCP: Pam Charles APRN    MRN: [id number] LOS: 1 days   AGE/SEX: 88 y.o. female  ROOM: [facility]     Subjective   Subjective   Chief Complaint   Patient presents with    Shortness of Breath     She has had shortness of breath with exertion and worse edema.  She reports that the swelling in her foot is better.  She fell on her right side.  She has neck pain with radiation to left side.  She is not reporting any chest pain currently.  She is not reporting productive cough fevers or chills.  She is having intermittent nausea.  Reports no abdominal pain however.  Has not vomited.  Reports dysuria.  She noticed lump of her sternum and also left lower quadrant of her left breast.  The left breast lump has been enlarging over the past weeks to month.     Objective   Objective   Vital Signs  Temp:  [97.5 °F (36.4 °C)-99 °F (37.2 °C)] 97.5 °F (36.4 °C)  Heart Rate:  [67-92] 72  Resp:  [18-20] 20  BP: ()/(57-81) 93/57  SpO2:  [90 %-95 %] 95 %  on  Flow (L/min):  [2-5] 2;   Device (Oxygen Therapy): nasal cannula  Body mass index is 20.69 kg/m².    Physical Exam  Vitals and nursing note reviewed.   Constitutional:       General: She is not in acute distress.     Appearance: She is ill-appearing. She is not diaphoretic.   HENT:      Head: Atraumatic.   Eyes:      General:         Right eye: No discharge.         Left eye: No discharge.      Conjunctiva/sclera: Conjunctivae normal.   Cardiovascular:      Rate and Rhythm: Normal rate and regular rhythm.      Pulses: Normal pulses.   Pulmonary:      Effort: Pulmonary effort is normal.      Breath sounds: Decreased breath sounds present. No wheezing.   Abdominal:      General: There is no distension.      Palpations: Abdomen is soft.      Tenderness: There is no abdominal tenderness. There is no guarding or rebound.   Musculoskeletal:         General: Tenderness present. No swelling.      Cervical back: Neck  supple. No tenderness.   Skin:     General: Skin is warm and dry.      Comments: 2 lumps noted centrally.  1 is in the left lower quadrant about 4-5 o'clock nearly sternal of the left breast.  There is a smaller superficial lump directly central above sternum.  Both are mobile and nontender.  The sternal 1 has a small black spot on it surface.   Neurological:      Mental Status: She is alert.      Cranial Nerves: No cranial nerve deficit.   Psychiatric:         Mood and Affect: Mood normal.         Behavior: Behavior normal.     Results Review  I reviewed the patient's new clinical results.    Results from last 7 days   Lab Units 07/12/23  0424 07/11/23  1236   WBC 10*3/mm3 6.01 7.04   HEMOGLOBIN g/dL 10.4* 10.4*   PLATELETS 10*3/mm3 187 204     Results from last 7 days   Lab Units 07/12/23  0424 07/11/23  1236   SODIUM mmol/L 144 142   POTASSIUM mmol/L 4.0 4.1   CHLORIDE mmol/L 102 103   CO2 mmol/L 29.1* 28.7   BUN mg/dL 31* 28*   CREATININE mg/dL 1.76* 1.86*   GLUCOSE mg/dL 99 108*   EGFR mL/min/1.73 27.9* 26.1*     Results from last 7 days   Lab Units 07/11/23  1236   ALBUMIN g/dL 4.2   BILIRUBIN mg/dL 0.3   ALK PHOS U/L 136*   AST (SGOT) U/L 24   ALT (SGPT) U/L 12     Results from last 7 days   Lab Units 07/12/23  0424 07/11/23  1236   CALCIUM mg/dL 9.2 9.2   ALBUMIN g/dL  --  4.2     Results from last 7 days   Lab Units 07/11/23  1236   PROCALCITONIN ng/mL 0.10     Glucose   Date/Time Value Ref Range Status   07/11/2023 2152 115 70 - 130 mg/dL Final     Comment:     Meter: [id number] : [id number] Ez Rodriguez       CT Cervical Spine Without Contrast    Result Date: 7/12/2023  Multilevel degenerative disease involving the cervical spine is noted as described above with no evidence of fracture. Further evaluation could be performed with a MRI examination of the cervical spine as indicated.    Radiation dose reduction techniques were utilized, including automated exposure control and exposure modulation based  on body size.       XR Chest 1 View    Result Date: 7/11/2023  Minimal likely atelectasis at the bases. Cardiomegaly with mild prominence of interstitial markings. Tortuous aorta.  This report was finalized on 7/11/2023 12:18 PM by Dr. Amilcar Marquez M.D.       I have personally reviewed all medications:  Scheduled Medications  aspirin, 81 mg, Oral, Daily  furosemide, 40 mg, Intravenous, Q12H  insulin lispro, 2-9 Units, Subcutaneous, 4x Daily AC & at Bedtime  isosorbide mononitrate, 60 mg, Oral, Daily  lisinopril, 5 mg, Oral, Daily  metoprolol tartrate, 25 mg, Oral, BID  pantoprazole, 40 mg, Oral, Daily  prasugrel, 5 mg, Oral, Daily  rosuvastatin, 10 mg, Oral, Daily  senna-docusate sodium, 2 tablet, Oral, BID      Infusions     Diet  Diet: Cardiac Diets, Diabetic Diets; Healthy Heart (2-3 Na+); Consistent Carbohydrate; Texture: Regular Texture (IDDSI 7); Fluid Consistency: Thin (IDDSI 0)    I have personally reviewed:  [x]  Laboratory   [x]  Microbiology   [x]  Radiology   [x]  EKG/Telemetry  []  Cardiology/Vascular   []  Pathology    [x]  Records       Assessment/Plan     Active Hospital Problems    Diagnosis  POA    CHF (congestive heart failure) [I50.9]  Yes      Resolved Hospital Problems    Diagnosis Date Resolved POA    Closed left hip fracture [S72.002A] 07/11/2023 Yes       88 y.o. female admitted with <principal problem not specified>.    Acute on chronic systolic heart failure: Prior ejection fraction 40%.  Repeat echo planned.  Continue diuresis.  Cardiology following.  Cervical neck pain with radiation: CT cervical spine has been obtained.  Continue symptom control.  Neurosurgery consulted.  Left breast nodule: Check mammogram/ultrasound.  Anemia: Monitor.  CKD: Appears to be CKD 4 and near baseline currently.  We will monitor with diuresis.  Reporting dysuria.  Check urinalysis.  Nausea/vomiting: She did have slight elevation of alk phos.  Did not have any abdominal tenderness on exam today.   Continue symptom control.  Repeat hepatic function and check lipase.  Also checking for UTI and cardiology is following for cardiac issues.  Diabetes: Prior A1c was above 6.5 last year.  More recently was 5.9.  Continue SSI and monitor.  Ppx: SCD  Disposition:TBD    Expected Discharge Date: 7/14/2023; Expected Discharge Time:      Jose Mejia MD  [facility]  07/12/23  14:24 EDT    Dictated portions of note using dragon dictation software.  Copied text in this note has been reviewed by me and remains accurate as of 07/12/23

## 2023-07-12 NOTE — PLAN OF CARE
Goal Outcome Evaluation:  Plan of Care Reviewed With: patient        Progress: improving          VSS on 3liters O2.  Pt reporting intermittent neck pain.  PRN meds given with some relief.  Pt went down for CT cervical spine earlier today.  Pt reporting no SOA.  Will CTM.

## 2023-07-12 NOTE — H&P
HISTORY AND PHYSICAL   [facility]        Date of Admission: 2023  Patient Identification:  Name: Marilynn Gil  Age: 88 y.o.  Sex: female  :  1935  MRN: [id number]                     Primary Care Physician: Pam Charles APRN    Chief Complaint:  88 year old female who presented to the emergency room with neck and shoulder pain as well as shortness of breath; she was initially seen by her pcp; she was hypoxic so she was sent to the ed    History of Present Illness:   As above    Past Medical History:  Past Medical History:   Diagnosis Date    Anxiety     CAD (coronary artery disease)     estimated time frame    CRI (chronic renal insufficiency), stage 4 (severe)     CVA (cerebral vascular accident)     Essential hypertension, benign     Essential tremor     GERD (gastroesophageal reflux disease)     History of DVT (deep vein thrombosis)     Impaired glucose metabolism     unclear how long this has been present, but only as of  did pt meet dx criteria for DM T2    Insomnia     Mesenteric ischemia, chronic     Mixed hyperlipidemia     Myocardial infarct     Neuropathy     both upper ext mainly hands after neck fx    Osteoporosis     Pancreatitis     hx of    Type 2 diabetes mellitus with hyperglycemia     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    CAD predates DM by decades    Type II diabetes mellitus with renal manifestations 2010    estimated time frame, precedes DM by many years    Venous thrombosis and embolism      Past Surgical History:  Past Surgical History:   Procedure Laterality Date    BACK SURGERY      x 4    BREAST MASS EXCISION      Benign    CATARACT EXTRACTION Bilateral     CHOLECYSTECTOMY  2013    COLONOSCOPY  2012    scarred mucosa,internal hemorrhoids    COLONOSCOPY  2012    Patent side to side ileo colonic anastomosis, one 7mm polyp in the ascending colon, erythematous and vascular pattern decreased mucosa in the transverse  colon, scarred mucosa in the rectum, NBIH.  PATH: Tubular adenoma, Patchy mild chronic inflammation     CORONARY ANGIOPLASTY      MI 1985, s/p angioplasty 1987    ENDOSCOPY  12/06/2013    z line irreg 38cm from the incisors, tortuous esophagus, HH, gastritis, bilious gastric fluid, duodenitis.  PATH: Gastric mucosa with minimal chornic infalmmation and with histologic features suggestive of reactive gastropathy.     PANCREAS SURGERY  05/2013    Pancreatic duct stricture, stent placed    RECTAL PROLAPSE REPAIR  2007    Rectopexy and sigmoidectomy, s/p rectal prolapse repair x 2 prior to 2007    SPHINCTEROTOMY  05/2013      Home Meds:  Medications Prior to Admission   Medication Sig Dispense Refill Last Dose    Acetaminophen (TYLENOL PO) Take 500 mg by mouth Every 6 (Six) Hours As Needed (pain).   7/10/2023 at 0900    albuterol sulfate  (90 Base) MCG/ACT inhaler Inhale 2 puffs Every 4 (Four) Hours As Needed for Wheezing. 18 g 11 7/10/2023    aspirin 81 MG chewable tablet Chew 1 tablet Daily.   7/11/2023 at 0900    Diclofenac Sodium (VOLTAREN) 1 % gel gel Apply 4 g topically to the appropriate area as directed 4 (Four) Times a Day As Needed (pain). 150 g 1 7/10/2023    furosemide (LASIX) 20 MG tablet Take 1 tablet by mouth Daily. 90 tablet 1 7/11/2023 at 0900    isosorbide mononitrate (IMDUR) 60 MG 24 hr tablet 1 tablet Daily.  3 7/11/2023 at 0900    lisinopril (PRINIVIL,ZESTRIL) 5 MG tablet Take 1 tablet by mouth Daily.   7/11/2023 at 0900    metoprolol tartrate (LOPRESSOR) 25 MG tablet Take 1 tablet by mouth 2 (Two) Times a Day. 180 tablet 3 Patient Taking Differently at 0900    NITROSTAT 0.4 MG SL tablet 1 tab sublingual every 5 minutes x 3 as needed for chest pressure  0     pantoprazole (PROTONIX) 40 MG EC tablet Take 1 tablet by mouth Daily. 90 tablet 0 7/11/2023 at 0900    prasugrel (EFFIENT) tablet Take 1 tablet by mouth Daily.   7/11/2023 at 0900    rosuvastatin (CRESTOR) 10 MG tablet Take 1 tablet by  mouth Daily. 90 tablet 2 7/10/2023 at 2200    Vibegron (GEMTESA PO) Take  by mouth Daily.   7/10/2023 at 2200    zolpidem (AMBIEN) 10 MG tablet TAKE 1 TABLET BY MOUTH AT NIGHT AS NEEDED FOR SLEEP 30 tablet 2 7/10/2023 at 2200    phenazopyridine (Pyridium) 200 MG tablet Take 1 tablet by mouth 3 (Three) Times a Day As Needed for Bladder Spasms. 6 tablet 0     potassium chloride (K-DUR,KLOR-CON) 10 MEQ CR tablet Take 1 tablet by mouth Daily. (Patient not taking: Reported on 2023) 90 tablet 1     traMADol (ULTRAM) 50 MG tablet Take 1 tablet by mouth Every 4 (Four) Hours As Needed for Moderate Pain. 60 tablet 0        Allergies:  Allergies   Allergen Reactions    Codeine Nausea And Vomiting    Gabapentin Other (See Comments)     Did not tolerate    Lyrica [Pregabalin] Other (See Comments)     Did not tolerate    Other Nausea And Vomiting     Darvocet    Shellfish-Derived Products Other (See Comments)     Cant remember    Sulfa Antibiotics Other (See Comments)     Can't remember     Immunizations:  Immunization History   Administered Date(s) Administered    COVID-19 (PFIZER) BIVALENT 12+YRS 2023    COVID-19 (PFIZER) Purple Cap Monovalent 2021, 03/10/2021    FLUAD TRI 65YR+ 2015    Fluad Quad 65+ 2020, 2020, 10/19/2021    Fluzone High Dose =>65 Years ([employer] ONLY) 10/04/2017, 10/02/2018, 10/02/2019    Fluzone High-Dose 65+yrs 10/11/2022    Influenza TIV (IM) 2015    Pneumococcal Conjugate 13-Valent (PCV13) 01/10/2018    Pneumococcal Polysaccharide (PPSV23) 2010    Tdap 2018, 2021     Social History:   Social History     Social History Narrative    LIVES ALONE     Social History     Socioeconomic History    Marital status:     Number of children: 2   Tobacco Use    Smoking status: Former     Packs/day: 2.00     Years: 20.00     Pack years: 40.00     Types: Cigarettes     Quit date:      Years since quittin.5    Smokeless tobacco: Never   Vaping Use  "   Vaping Use: Never used   Substance and Sexual Activity    Alcohol use: No    Drug use: No    Sexual activity: Defer       Family History:  Family History   Problem Relation Age of Onset    Cancer Brother     Pancreatitis Paternal Aunt     Diabetes Father         Review of Systems  See history of present illness and past medical history.  Patient denies headache, dizziness, syncope, falls, trauma, change in vision, change in hearing, change in taste, changes in weight, changes in appetite, focal weakness, numbness, or paresthesia.  Patient denies chest pain, palpitations, dyspnea, orthopnea, PND, cough, sinus pressure, rhinorrhea, epistaxis, hemoptysis, nausea, vomiting,hematemesis, diarrhea, constipation or hematochezia.  Denies cold or heat intolerance, polydipsia, polyuria, polyphagia. Denies hematuria, pyuria, dysuria, hesitancy, frequency or urgency. Denies consumption of raw and under cooked meats foods or change in water source.  Denies fever, chills, sweats, night sweats.  Denies missing any routine medications. Remainder of ROS is negative.    Objective:  T Max 24 hrs: Temp (24hrs), Av.7 °F (36.5 °C), Min:97.4 °F (36.3 °C), Max:98.2 °F (36.8 °C)    Vitals Ranges:   Temp:  [97.4 °F (36.3 °C)-98.2 °F (36.8 °C)] 97.6 °F (36.4 °C)  Heart Rate:  [63-92] 92  Resp:  [18-22] 20  BP: (120-161)/(67-88) 132/79      Exam:  /79 (BP Location: Right arm, Patient Position: Lying)   Pulse 92   Temp 97.6 °F (36.4 °C) (Oral)   Resp 20   Ht 147.3 cm (58\")   Wt 47.2 kg (104 lb)   SpO2 92%   BMI 21.74 kg/m²     General Appearance:    Alert, cooperative, no distress, appears stated age   Head:    Normocephalic, without obvious abnormality, atraumatic   Eyes:    PERRL, conjunctivae/corneas clear, EOM's intact, both eyes   Ears:    Normal external ear canals, both ears   Nose:   Nares normal, septum midline, mucosa normal, no drainage    or sinus tenderness   Throat:   Lips, mucosa, and tongue normal   Neck:   " Supple, symmetrical, trachea midline, no adenopathy;     thyroid:  no enlargement/tenderness/nodules; no carotid    bruit or JVD   Back:     Symmetric, no curvature, ROM normal, no CVA tenderness   Lungs:     Decreased breath sounds bilaterally, respirations unlabored   Chest Wall:    No tenderness or deformity    Heart:    Regular rate and rhythm, S1 and S2 normal, no murmur, rub   or gallop   Abdomen:     Soft, nontender, bowel sounds active all four quadrants,     no masses, no hepatomegaly, no splenomegaly   Extremities:   Extremities normal, atraumatic, no cyanosis or edema   Pulses:   2+ and symmetric all extremities   Skin:   Skin color, texture, turgor normal, no rashes or lesions               .    Data Review:  Labs in chart were reviewed.  WBC   Date Value Ref Range Status   07/11/2023 7.04 3.40 - 10.80 10*3/mm3 Final     Hemoglobin   Date Value Ref Range Status   07/11/2023 10.4 (L) 12.0 - 15.9 g/dL Final     Hematocrit   Date Value Ref Range Status   07/11/2023 34.3 34.0 - 46.6 % Final     Platelets   Date Value Ref Range Status   07/11/2023 204 140 - 450 10*3/mm3 Final     Sodium   Date Value Ref Range Status   07/11/2023 142 136 - 145 mmol/L Final     Potassium   Date Value Ref Range Status   07/11/2023 4.1 3.5 - 5.2 mmol/L Final     Chloride   Date Value Ref Range Status   07/11/2023 103 98 - 107 mmol/L Final     CO2   Date Value Ref Range Status   07/11/2023 28.7 22.0 - 29.0 mmol/L Final     BUN   Date Value Ref Range Status   07/11/2023 28 (H) 8 - 23 mg/dL Final     Creatinine   Date Value Ref Range Status   07/11/2023 1.86 (H) 0.57 - 1.00 mg/dL Final     Glucose   Date Value Ref Range Status   07/11/2023 108 (H) 65 - 99 mg/dL Final     Calcium   Date Value Ref Range Status   07/11/2023 9.2 8.6 - 10.5 mg/dL Final                Imaging Results (All)       Procedure Component Value Units Date/Time    XR Chest 1 View [[id number]] Collected: 07/11/23 1217     Updated: 07/11/23 1223    Narrative:       XR CHEST 1 VW-     HISTORY: Female who is 88 years-old,  short of breath     TECHNIQUE: Frontal views of the chest     COMPARISON: None available     FINDINGS: The heart is enlarged. Aorta is tortuous. Mild prominence of  interstitial markings. Minimal likely atelectasis at the bases. No  pleural effusion, or pneumothorax. No acute osseous process.       Impression:      Minimal likely atelectasis at the bases. Cardiomegaly with  mild prominence of interstitial markings. Tortuous aorta.     This report was finalized on 7/11/2023 12:18 PM by Dr. Amilcar Marquez M.D.                 Assessment:  Active Hospital Problems    Diagnosis  POA    CHF (congestive heart failure) [I50.9]  Yes       Neck pain          diabetes     Cad  Hypertension  Acute hypoxic respiratory failure  Anemia  Ckd4      Plan:  Cardiology to see  Diurese  Ct cervical spine  Neurosurgery to see  Monitor on telemetry  Accu checks, insulin sliding scale  Dw patient and ed provider    Lacey Garibay MD  7/11/2023  20:28 EDT

## 2023-07-12 NOTE — CONSULTS
[facility]   Consult Note    Patient Name: Marilynn Gil  : 1935  MRN: [id number]  Primary Care Physician:  Pam Charles APRN  Referring Physician: Lacey Garibay MD  Date of admission: 2023    Inpatient Neurosurgery Consult  Consult performed by: Meena Leigh APRN  Consult ordered by: Lacey Garibay MD  Reason for consult: Cervical fracture      Subjective   Subjective     Reason for Consult/ Chief Complaint: Cervical fracture    History of Present Illness  Marilynn Gil is a 88 y.o. female with a history of falls.  She has had recent evaluations with orthopedics for knee pain.  She had also sustained a distal left femur fracture also caused by a fall.  Patient reports a long standing history of neck pain as well as left shoulder pain.  She notes some radiation of the pain into the proximal arm.  Her primary complaint is of neck pain however.  She denies any numbness or tingling in her upper or lower extremities.  She denies any heaviness in her legs or numbness.  She states that she is not really sure how she experiences the falls but it is usually associated with inability to stay upright when she is toileting.  Getting on and off the commode, in and out of the bathtub, etc.  Due to the patient's complaints of neck and left shoulder pain, an orthopedics consult was requested however orthopedics deferred the consult to neurosurgery.  Cervical spine CT revealed multilevel degenerative changes but no evidence of acute fracture.  Of note, the patient has had prior CT images that have revealed healed fracture of the C1 posterior arch as well as the spinous process of C5 as well as a healed fracture of the left C2 lamina.  Repeat cervical CT performed here at [facility] today reveals no evidence of acute fracture there are multilevel degenerative changes.  MRI cervical spine is currently pending.    Review of Systems   Constitutional:  Positive for activity  change. Negative for fever and unexpected weight change.   HENT: Negative.  Negative for sore throat and trouble swallowing.    Eyes: Negative.    Respiratory: Negative.  Negative for cough, choking, chest tightness and shortness of breath.    Cardiovascular: Negative.  Negative for chest pain.   Gastrointestinal: Negative.  Negative for abdominal pain, blood in stool and nausea.   Endocrine: Negative.    Genitourinary: Negative.  Negative for difficulty urinating.   Musculoskeletal:  Positive for back pain and neck pain. Negative for gait problem.        The patient has a history of prior lumbar fusion and chronic lumbar pain.   Skin: Negative.    Allergic/Immunologic: Negative.    Neurological:  Positive for weakness (proximal L arm and shoulder weakness). Negative for dizziness, seizures, light-headedness, numbness and headaches.   Psychiatric/Behavioral: Negative.        Personal History     Past Medical History:   Diagnosis Date    Anxiety     CAD (coronary artery disease) 1985    estimated time frame    CRI (chronic renal insufficiency), stage 4 (severe)     CVA (cerebral vascular accident)     Essential hypertension, benign     Essential tremor     GERD (gastroesophageal reflux disease)     History of DVT (deep vein thrombosis)     Impaired glucose metabolism     unclear how long this has been present, but only as of '22 did pt meet dx criteria for DM T2    Insomnia     Mesenteric ischemia, chronic     Mixed hyperlipidemia     Myocardial infarct 2012    Neuropathy     both upper ext mainly hands after neck fx    Osteoporosis     Pancreatitis     hx of    Type 2 diabetes mellitus with hyperglycemia 2022    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    CAD predates DM by decades    Type II diabetes mellitus with renal manifestations 2010    estimated time frame, precedes DM by many years    Venous thrombosis and embolism        Past Surgical History:   Procedure Laterality Date    BACK SURGERY      x 4     BREAST MASS EXCISION      Benign    CATARACT EXTRACTION Bilateral     CHOLECYSTECTOMY  08/2013    COLONOSCOPY  12/2012    scarred mucosa,internal hemorrhoids    COLONOSCOPY  09/12/2012    Patent side to side ileo colonic anastomosis, one 7mm polyp in the ascending colon, erythematous and vascular pattern decreased mucosa in the transverse colon, scarred mucosa in the rectum, NBIH.  PATH: Tubular adenoma, Patchy mild chronic inflammation     CORONARY ANGIOPLASTY      MI 1985, s/p angioplasty 1987    ENDOSCOPY  12/06/2013    z line irreg 38cm from the incisors, tortuous esophagus, HH, gastritis, bilious gastric fluid, duodenitis.  PATH: Gastric mucosa with minimal chornic infalmmation and with histologic features suggestive of reactive gastropathy.     PANCREAS SURGERY  05/2013    Pancreatic duct stricture, stent placed    RECTAL PROLAPSE REPAIR  2007    Rectopexy and sigmoidectomy, s/p rectal prolapse repair x 2 prior to 2007    SPHINCTEROTOMY  05/2013       Family History: family history includes Cancer in her brother; Diabetes in her father; Pancreatitis in her paternal aunt. Otherwise pertinent FHx was reviewed and not pertinent to current issue.    Social History:  reports that she quit smoking about 33 years ago. Her smoking use included cigarettes. She has a 40.00 pack-year smoking history. She has never used smokeless tobacco. She reports that she does not drink alcohol and does not use drugs.    Home Medications:   Acetaminophen, Diclofenac Sodium, Vibegron, albuterol sulfate HFA, aspirin, furosemide, isosorbide mononitrate, lisinopril, metoprolol tartrate, nitroglycerin, pantoprazole, phenazopyridine, potassium chloride, prasugrel, rosuvastatin, traMADol, and zolpidem    Allergies:  Allergies   Allergen Reactions    Codeine Nausea And Vomiting    Gabapentin Other (See Comments)     Did not tolerate    Lyrica [Pregabalin] Other (See Comments)     Did not tolerate    Other Nausea And Vomiting     Darvocet     Shellfish-Derived Products Other (See Comments)     Cant remember    Sulfa Antibiotics Other (See Comments)     Can't remember       Objective    Objective     Vitals:  Temp:  [97.5 °F (36.4 °C)-99 °F (37.2 °C)] 98.6 °F (37 °C)  Heart Rate:  [67-84] 84  Resp:  [18-20] 18  BP: ()/(57-72) 105/64  Flow (L/min):  [2-5] 2    Physical Exam  Vitals reviewed.   Constitutional:       General: She is not in acute distress.     Appearance: She is well-developed. She is not ill-appearing, toxic-appearing or diaphoretic.   HENT:      Head: Normocephalic and atraumatic.      Nose: Nose normal.      Mouth/Throat:      Mouth: Mucous membranes are moist.   Eyes:      General:         Right eye: No discharge.         Left eye: No discharge.      Extraocular Movements: Extraocular movements intact.      Conjunctiva/sclera: Conjunctivae normal.      Pupils: Pupils are equal, round, and reactive to light.   Neck:      Trachea: No tracheal deviation.   Cardiovascular:      Rate and Rhythm: Normal rate.   Pulmonary:      Effort: Pulmonary effort is normal. No respiratory distress.   Abdominal:      General: There is no distension.      Palpations: Abdomen is soft.      Tenderness: There is no abdominal tenderness.   Musculoskeletal:         General: No swelling, tenderness or deformity. Normal range of motion.      Cervical back: Normal range of motion and neck supple. No rigidity.      Right lower leg: No edema.      Left lower leg: No edema.      Comments: Tender to palpation in the lower left paraspinous muscles.   Skin:     General: Skin is warm and dry.      Findings: No erythema.   Neurological:      General: No focal deficit present.      Mental Status: She is alert and oriented to person, place, and time.      GCS: GCS eye subscore is 4. GCS verbal subscore is 5. GCS motor subscore is 6.      Cranial Nerves: No cranial nerve deficit.      Sensory: No sensory deficit.      Motor: Weakness present. No abnormal muscle tone.       Coordination: Coordination normal.      Deep Tendon Reflexes: Reflexes are normal and symmetric. Reflexes normal.      Comments: AA&O x 3.  Speech is normal.  Converses appropriately.  PERRL. EOM's intact. Face symmetric. Tongue midline without fasiculations or atrophy. Sensation equal and intact throughout the face.  Hearing is intact bilaterally to finger rustle. Negative pronator drift.  No dysmetria.  Aside from left deltoid weakness, the remainder of the motor exam is within normal limits.  No sensory deficits. DTR's normal. Negative Juárez's; negative clonus.  Gait was not tested due to safety concern.   Psychiatric:         Behavior: Behavior is cooperative.         Thought Content: Thought content normal.       Result Review    Result Review:  I have personally reviewed the results from the time of this admission to 7/12/2023 22:30 EDT and agree with these findings:  []  Laboratory list / accordion  []  Microbiology  [x]  Radiology  []  EKG/Telemetry   []  Cardiology/Vascular   []  Pathology  [x]  Old records  []  Other:  Most notable findings include:     CT imaging of the cervical spine performed at [facility] today shows multilevel degenerative changes.  There is degenerative disc space narrowing at C4-5 and facet changes on the left at C5-6.  I see no severe canal compromise and no evidence of acute fracture.  The patient has also undergone prior cervical spine CT the images in the past which are summarized in the history of present illness.      Assessment & Plan   Assessment / Plan     Brief Patient Summary:  Marilynn Gil is a 88 y.o. female who has a long history of falls.  She is not quite sure how the falls have occurred.  The falls are often associated with transferring on/off commode, in/out bathtub.  Patient denies any history of heaviness in her legs or a sense of leg weakness.  She has no numbness and tingling in her arms or legs.  She has a chronic history of low back  pain stemming from prior lumbar fusion surgery several years ago.  She also has a history of chronic neck pain and has had spine fractures in the past as discussed on previous CT imaging of the cervical spine in the history of present illness.  Patient has experienced another fall.  Her complaint is of worsening but chronic cervical pain and left shoulder pain.    Active Hospital Problems:  Active Hospital Problems    Diagnosis     DDD (degenerative disc disease), cervical     Cervical pain (neck)     CHF (congestive heart failure)      Plan:     MRI imaging of the cervical spine is already been ordered by the admitting service.  We will review those images once completed and make further recommendations at that time.    Meena Leigh, APRN

## 2023-07-13 NOTE — PLAN OF CARE
Goal Outcome Evaluation:  Plan of Care Reviewed With: patient           Outcome Evaluation: Pt is a 88 y.o female admitted from home with fall, L shoulder pain and neck pain, SOB, CHF exacerbation. Pt with hx of L dital femur fx, WBAT from 6/2022. Pt had also been previously seeing ortho for R knee pain and L shoulder pain with injections. Pt has hx of neck pain, spinal fractures, hx of falling. Per [facility] notes, pt with multiple falls getting off the commode in/out of the tub. Pt lives alone, she uses a rwx. Pt today with decreased ability to raise L shoulder without pain. Also noted c spine MRI ordered in chart. Pt cleared for activity per GIOVANY. Limited assessment completed today as transport came for pt while participating in OT eval and following testing pt was transfered to CCU. Pt does have a difficulty with mobility to the door to get to the stretcher and was unable to put on her socks before hand. She would benefit from continued OT. Recommend SNF at dc to improve balance and safety with ADLs prior to returning home if pt is agreeable.      Anticipated Discharge Disposition (OT): skilled nursing facility (pending progress)

## 2023-07-13 NOTE — CASE MANAGEMENT/SOCIAL WORK
Continued Stay Note  [facility]     Patient Name: Marilynn Gil  MRN: [id number]  Today's Date: 7/13/2023    Admit Date: 7/11/2023        Discharge Plan       Row Name 07/13/23 1222       Plan    Plan Comments Attempted to screen, pt off the floor for testing. Left [facility] and CCP contact info at bedside. Rose ARANA/CCP                   Discharge Codes    No documentation.                 Expected Discharge Date and Time       Expected Discharge Date Expected Discharge Time    Jul 14, 2023               Kimberly Galeano RN

## 2023-07-13 NOTE — PLAN OF CARE
Problem: Adult Inpatient Plan of Care  Goal: Plan of Care Review  Outcome: Ongoing, Progressing  Goal: Patient-Specific Goal (Individualized)  Outcome: Ongoing, Progressing  Goal: Absence of Hospital-Acquired Illness or Injury  Outcome: Ongoing, Progressing  Goal: Optimal Comfort and Wellbeing  Outcome: Ongoing, Progressing  Goal: Readiness for Transition of Care  Outcome: Ongoing, Progressing     Problem: Fall Injury Risk  Goal: Absence of Fall and Fall-Related Injury  Outcome: Ongoing, Progressing

## 2023-07-13 NOTE — PLAN OF CARE
Problem: Adult Inpatient Plan of Care  Goal: Plan of Care Review  Recent Flowsheet Documentation  Taken 7/13/2023 1437 by Kimberly Corona PT  Plan of Care Reviewed With: patient  Outcome Evaluation: Pt was admitted with neck and L shoulder pain along with SOA and wheezing. Pt reports she lives alone and ambulates with Rwx. Pt has h/o L femur fracture and is WBAT per chart. Ortho has seen for R knee pain. GIOVANY was consulted for neck pain but pt has been cleared to participate with therapies. Pt presents with pain, generalized weakness, limited activity tolerance and unsteady gait. Pt would benefit from PT to address these impairments. Recommend home with HH PT vs SNF pending progress.   Goal Outcome Evaluation:  Plan of Care Reviewed With: patient           Outcome Evaluation: Pt was admitted with neck and L shoulder pain along with SOA and wheezing. Pt reports she lives alone and ambulates with Rwx. Pt has h/o L femur fracture and is WBAT per chart. Ortho has seen for R knee pain. GIOVANY was consulted for neck pain but pt has been cleared to participate with therapies. Pt presents with pain, generalized weakness, limited activity tolerance and unsteady gait. Pt would benefit from PT to address these impairments. Recommend home with HH PT vs SNF pending progress.      Anticipated Discharge Disposition (PT): home with assist, home with home health, skilled nursing facility

## 2023-07-13 NOTE — PROGRESS NOTES
Marilynn Gil   88 y.o.  female    LOS: 2 days   Patient Care Team:  Pam Charles APRN as PCP - General (Internal Medicine)  Joycelyn Zapata MD (Inactive) as PCP - Family Medicine      Subjective     Interval History:     Patient Complaints:     Review of Systems:       Medication Review:   Current Facility-Administered Medications:     acetaminophen (TYLENOL) tablet 650 mg, 650 mg, Oral, Q4H PRN, Lacey Garibay MD, 650 mg at 07/11/23 2254    albuterol (PROVENTIL) nebulizer solution 0.083% 2.5 mg/3mL, 2.5 mg, Nebulization, Q6H PRN, Lacey Garibay MD    aspirin chewable tablet 81 mg, 81 mg, Oral, Daily, Lacey Garibay MD, 81 mg at 07/13/23 0908    sennosides-docusate (PERICOLACE) 8.6-50 MG per tablet 2 tablet, 2 tablet, Oral, BID, 2 tablet at 07/12/23 2048 **AND** polyethylene glycol (MIRALAX) packet 17 g, 17 g, Oral, Daily PRN **AND** bisacodyl (DULCOLAX) EC tablet 5 mg, 5 mg, Oral, Daily PRN **AND** bisacodyl (DULCOLAX) suppository 10 mg, 10 mg, Rectal, Daily PRN, Lacey Garibay MD    dextrose (D50W) (25 g/50 mL) IV injection 25 g, 25 g, Intravenous, Q15 Min PRN, Lacey Garibay MD    dextrose (GLUTOSE) oral gel 15 g, 15 g, Oral, Q15 Min PRN, Lacey Garibay MD    furosemide (LASIX) injection 40 mg, 40 mg, Intravenous, Q12H, Lacey Garibay MD, 40 mg at 07/13/23 0909    glucagon (GLUCAGEN) injection 1 mg, 1 mg, Intramuscular, Q15 Min PRN, Lacey Garibay MD    HYDROcodone-acetaminophen (NORCO) 5-325 MG per tablet 1 tablet, 1 tablet, Oral, Q6H PRN, Lacey Garibay MD, 1 tablet at 07/13/23 0229    HYDROmorphone (DILAUDID) injection 0.5 mg, 0.5 mg, Intravenous, Q2H PRN, Jose Mejia MD, 0.5 mg at 07/12/23 1609    insulin lispro (HUMALOG/ADMELOG) injection 2-9 Units, 2-9 Units, Subcutaneous, 4x Daily AC & at Bedtime, Lacey Garibay MD    isosorbide mononitrate (IMDUR) 24 hr tablet 60 mg, 60 mg, Oral, Daily, Lacey Garibay,  MD, 60 mg at 07/13/23 0859    lisinopril (PRINIVIL,ZESTRIL) tablet 5 mg, 5 mg, Oral, Daily, Lacey Garibay MD, 5 mg at 07/13/23 0859    melatonin tablet 3 mg, 3 mg, Oral, Nightly PRN, Lacey Garibay MD, 3 mg at 07/11/23 2253    metoprolol tartrate (LOPRESSOR) tablet 25 mg, 25 mg, Oral, BID, Lacey Garibay MD, 25 mg at 07/13/23 0916    ondansetron (ZOFRAN) tablet 4 mg, 4 mg, Oral, Q6H PRN **OR** ondansetron (ZOFRAN) injection 4 mg, 4 mg, Intravenous, Q6H PRN, Lacey Garibay MD, 4 mg at 07/12/23 1609    pantoprazole (PROTONIX) EC tablet 40 mg, 40 mg, Oral, Daily, Lacey Garibay MD, 40 mg at 07/13/23 0916    prasugrel (EFFIENT) tablet 5 mg, 5 mg, Oral, Daily, Lacey Garibay MD, 5 mg at 07/13/23 0910    promethazine (PHENERGAN) tablet 12.5 mg, 12.5 mg, Oral, Q6H PRN, 12.5 mg at 07/12/23 1050 **OR** promethazine (PHENERGAN) suppository 12.5 mg, 12.5 mg, Rectal, Q6H PRN, Jose Mejia MD    rosuvastatin (CRESTOR) tablet 10 mg, 10 mg, Oral, Daily, Lacey Garibay MD, 10 mg at 07/13/23 0859    [COMPLETED] Insert Peripheral IV, , , Once **AND** sodium chloride 0.9 % flush 10 mL, 10 mL, Intravenous, PRN, Carlton España MD    zolpidem (AMBIEN) tablet 5 mg, 5 mg, Oral, Nightly PRN, Jose Mejia MD, 5 mg at 07/13/23 0127      Objective   Vital Sign Min/Max for last 24 hours  Temp  Min: 97.5 °F (36.4 °C)  Max: 98.6 °F (37 °C)   BP  Min: 93/57  Max: 105/57    Pulse  Min: 72  Max: 84     Wt Readings from Last 3 Encounters:   07/13/23 43.5 kg (95 lb 14.4 oz)   07/11/23 47.2 kg (104 lb)   06/23/23 45.9 kg (101 lb 3.2 oz)        Intake/Output Summary (Last 24 hours) at 7/13/2023 0925  Last data filed at 7/13/2023 0511  Gross per 24 hour   Intake 360 ml   Output 600 ml   Net -240 ml     Physical Exam:      General Appearance:    Well developed and well nourished in no acute distress   Head:    Normocephalic, atraumatic   Eyes:            Conjunctivae normal,  anicteric, no xanthelasma   Neck:   supple, trachea midline, no thyromegaly, no carotid bruit, no JVD, no elevated CVP   Lungs:     Clear to auscultation,respirations regular, even and                  unlabored    Heart:    Regular rhythm and normal rate, normal S1 and S2,            No murmur, no gallop, no rub, no click   Chest Wall:    No abnormalities observed   Abdomen:     Normal bowel sounds, no masses, no organomegaly, soft        nontender, nondistended, no guarding, no rebound                tenderness   Rectal:     Deferred   Extremities:   No edema. Moves all extremities well, no cyanosis, no erythema   Pulses:   Pulses palpable and equal bilaterally   Skin:   No bleeding, bruising or rash   Neurologic:   awake alert and oriented x3, speech clear and approp, no facial drooping     :    Monitor:      Results Review:         Sodium Sodium   Date Value Ref Range Status   07/13/2023 139 136 - 145 mmol/L Final   07/12/2023 144 136 - 145 mmol/L Final   07/11/2023 142 136 - 145 mmol/L Final      Potassium Potassium   Date Value Ref Range Status   07/13/2023 5.0 3.5 - 5.2 mmol/L Final     Comment:     Slight hemolysis detected by analyzer. Results may be affected.   07/12/2023 4.0 3.5 - 5.2 mmol/L Final   07/11/2023 4.1 3.5 - 5.2 mmol/L Final      Chloride Chloride   Date Value Ref Range Status   07/13/2023 98 98 - 107 mmol/L Final   07/12/2023 102 98 - 107 mmol/L Final   07/11/2023 103 98 - 107 mmol/L Final      Bicarbonate No results found for: PLASMABICARB   BUN BUN   Date Value Ref Range Status   07/13/2023 46 (H) 8 - 23 mg/dL Final   07/12/2023 31 (H) 8 - 23 mg/dL Final   07/11/2023 28 (H) 8 - 23 mg/dL Final      Creatinine Creatinine   Date Value Ref Range Status   07/13/2023 2.86 (H) 0.57 - 1.00 mg/dL Final   07/12/2023 1.76 (H) 0.57 - 1.00 mg/dL Final   07/11/2023 1.86 (H) 0.57 - 1.00 mg/dL Final      Calcium Calcium   Date Value Ref Range Status   07/13/2023 8.8 8.6 - 10.5 mg/dL Final   07/12/2023  9.2 8.6 - 10.5 mg/dL Final   07/11/2023 9.2 8.6 - 10.5 mg/dL Final      Magnesium Magnesium   Date Value Ref Range Status   07/13/2023 2.2 1.6 - 2.4 mg/dL Final        Results from last 7 days   Lab Units 07/13/23  0450   WBC 10*3/mm3 7.33   HEMOGLOBIN g/dL 10.3*   HEMATOCRIT % 34.2   PLATELETS 10*3/mm3 214     Lab Results   Lab Value Date/Time    TROPONINT 44 (H) 07/12/2023 0424    TROPONINT 43 (H) 07/12/2023 0012    TROPONINT 35 (H) 07/11/2023 2205    TROPONINT 40 (H) 07/11/2023 1236            Echo EF Estimated  No results found for: ECHOEFEST      Assessment/ Plan  Assessment & Plan   Active Hospital Problems    Diagnosis  POA    DDD (degenerative disc disease), cervical [M50.30]  Yes    Cervical pain (neck) [M54.2]  Yes    CHF (congestive heart failure) [I50.9]  Yes       ICM  2. Acute on chronic HFrEF    A. 2009 and a catheterization. Ejection fraction was 40%. There was no discrete clot in her apex, which had been noted years ago. There was an 80 percent circumflex stenosis, which was stented. She had a previous LAD PTCA, which was patent.    B. Per office note- refuses defibrillation  3. CAD  4. Hypoxia  5. Falls   6. Neck and shoulder pain  7. HLD  8. DDD  9. Chronic pancreatitis         Check Echo.   MRI report pending        Birdie Jay MD  07/13/23  09:25 EDT

## 2023-07-13 NOTE — CONSULTS
[facility] Consult Note      Patient Name: Marilynn Gil  : 1935  MRN: [id number]  Primary Care Physician:  Pam Charles APRN  Referring Physician: Lacey Garibay MD  Date of admission: 2023    Subjective     Reason for Consult: Acute kidney injury on chronic kidney disease    HPI:   Marilynn Gil is a 88 y.o. female patient was admitted on 2023, with neck and shoulder pain as well as shortness of breath patient was hypoxic and she had evidence of pulmonary edema  She had received diuretics currently also on ACE inhibitor this morning she is hypotensive she had worsening renal function hence nephrology consult was requested  The patient has abnormal renal function in  creatinine was between 1.5 and 1.7, in  was as low as 1.1 and as high as 1.97 then in  she was in the 2 range in .88 in  was around 2 in  around 2, on admission creatinine 1.86 and this morning up to 2.86.  When I asked the patient about why she is being admitted she said she was spending a lot of time in the bathroom passing urine apparently she was having dysuria but the urinalysis was essentially negative no evidence of pyuria to suggest UTI.  The patient has shortness of breath with orthopnea and PND, she had nausea but no vomiting, no abdominal pain complaining of being very weak.  I asked the patient if she was seen by a nephrologist she could not recall and there is no nephrology mention in old record also in the care everywhere.    Review of Systems:   14 point review of systems is otherwise negative except for mentioned above on HPI    Personal History     Past Medical History:   Diagnosis Date    Anxiety     CAD (coronary artery disease)     estimated time frame    CRI (chronic renal insufficiency), stage 4 (severe)     CVA (cerebral vascular accident)     Essential hypertension, benign     Essential tremor     GERD (gastroesophageal reflux disease)      History of DVT (deep vein thrombosis)     Impaired glucose metabolism     unclear how long this has been present, but only as of '22 did pt meet dx criteria for DM T2    Insomnia     Mesenteric ischemia, chronic     Mixed hyperlipidemia     Myocardial infarct 2012    Neuropathy     both upper ext mainly hands after neck fx    Osteoporosis     Pancreatitis     hx of    Type 2 diabetes mellitus with hyperglycemia 2022    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    CAD predates DM by decades    Type II diabetes mellitus with renal manifestations 2010    estimated time frame, precedes DM by many years    Venous thrombosis and embolism        Past Surgical History:   Procedure Laterality Date    BACK SURGERY      x 4    BREAST MASS EXCISION      Benign    CATARACT EXTRACTION Bilateral     CHOLECYSTECTOMY  08/2013    COLONOSCOPY  12/2012    scarred mucosa,internal hemorrhoids    COLONOSCOPY  09/12/2012    Patent side to side ileo colonic anastomosis, one 7mm polyp in the ascending colon, erythematous and vascular pattern decreased mucosa in the transverse colon, scarred mucosa in the rectum, NBIH.  PATH: Tubular adenoma, Patchy mild chronic inflammation     CORONARY ANGIOPLASTY      MI 1985, s/p angioplasty 1987    ENDOSCOPY  12/06/2013    z line irreg 38cm from the incisors, tortuous esophagus, HH, gastritis, bilious gastric fluid, duodenitis.  PATH: Gastric mucosa with minimal chornic infalmmation and with histologic features suggestive of reactive gastropathy.     PANCREAS SURGERY  05/2013    Pancreatic duct stricture, stent placed    RECTAL PROLAPSE REPAIR  2007    Rectopexy and sigmoidectomy, s/p rectal prolapse repair x 2 prior to 2007    SPHINCTEROTOMY  05/2013       Family History: family history includes Cancer in her brother; Diabetes in her father; Pancreatitis in her paternal aunt.    Social History:  reports that she quit smoking about 33 years ago. Her smoking use included cigarettes. She has a  40.00 pack-year smoking history. She has never used smokeless tobacco. She reports that she does not drink alcohol and does not use drugs.    Home Medications:  Prior to Admission medications    Medication Sig Start Date End Date Taking? Authorizing Provider   Acetaminophen (TYLENOL PO) Take 500 mg by mouth Every 6 (Six) Hours As Needed (pain). 3/18/22  Yes Peter Francisco MD   albuterol sulfate  (90 Base) MCG/ACT inhaler Inhale 2 puffs Every 4 (Four) Hours As Needed for Wheezing. 4/12/23  Yes Pam Charles APRN   aspirin 81 MG chewable tablet Chew 1 tablet Daily.   Yes ProviderPeter MD   Diclofenac Sodium (VOLTAREN) 1 % gel gel Apply 4 g topically to the appropriate area as directed 4 (Four) Times a Day As Needed (pain). 3/21/22  Yes Pam Charles APRN   furosemide (LASIX) 20 MG tablet Take 1 tablet by mouth Daily. 4/12/23  Yes Pam Charles APRN   isosorbide mononitrate (IMDUR) 60 MG 24 hr tablet 1 tablet Daily.   Yes ProviderPeter MD   lisinopril (PRINIVIL,ZESTRIL) 5 MG tablet Take 1 tablet by mouth Daily.   Yes ProviderPeter MD   metoprolol tartrate (LOPRESSOR) 25 MG tablet Take 1 tablet by mouth 2 (Two) Times a Day. 4/12/23  Yes Pam Charles APRN   NITROSTAT 0.4 MG SL tablet 1 tab sublingual every 5 minutes x 3 as needed for chest pressure 2/24/16  Yes Joycelyn Zapata MD   pantoprazole (PROTONIX) 40 MG EC tablet Take 1 tablet by mouth Daily. 4/12/23  Yes Pam Charles APRN   prasugrel (EFFIENT) tablet Take 1 tablet by mouth Daily.   Yes ProviderPeter MD   rosuvastatin (CRESTOR) 10 MG tablet Take 1 tablet by mouth Daily. 4/12/23  Yes Pam Charles APRN   Vibegron (GEMTESA PO) Take  by mouth Daily.   Yes ProviderPeter MD   zolpidem (AMBIEN) 10 MG tablet TAKE 1 TABLET BY MOUTH AT NIGHT AS NEEDED FOR SLEEP 4/17/23  Yes Pam Charles APRN   phenazopyridine (Pyridium) 200 MG tablet Take 1 tablet by mouth 3 (Three) Times a Day As Needed for Bladder  Spasms. 6/23/23   Pam Charles APRN   potassium chloride (K-DUR,KLOR-CON) 10 MEQ CR tablet Take 1 tablet by mouth Daily.  Patient not taking: Reported on 6/23/2023 4/12/23   Pam Charles APRN   traMADol (ULTRAM) 50 MG tablet Take 1 tablet by mouth Every 4 (Four) Hours As Needed for Moderate Pain. 2/24/23   Vishal Adamson MD       Allergies:  Allergies   Allergen Reactions    Codeine Nausea And Vomiting    Gabapentin Other (See Comments)     Did not tolerate    Lyrica [Pregabalin] Other (See Comments)     Did not tolerate    Other Nausea And Vomiting     Darvocet    Shellfish-Derived Products Other (See Comments)     Cant remember    Sulfa Antibiotics Other (See Comments)     Can't remember       Objective     Vitals:   Temp:  [98.2 °F (36.8 °C)-98.6 °F (37 °C)] 98.4 °F (36.9 °C)  Heart Rate:  [71-84] 71  Resp:  [18] 18  BP: ()/(50-64) 90/50  Flow (L/min):  [2-3] 2    Intake/Output Summary (Last 24 hours) at 7/13/2023 1349  Last data filed at 7/13/2023 0511  Gross per 24 hour   Intake 360 ml   Output 600 ml   Net -240 ml       Physical Exam:   Constitutional: Awake, alert, chronically ill and frail no acute distress.  HEENT: Sclera anicteric, no conjunctival injection  Neck: Supple, no thyromegaly, no lymphadenopathy, trachea at midline, significant increased JVD  Respiratory: Bilateral crackles and rhonchi, nonlabored respiration  Cardiovascular: RRR, no murmurs, no rubs, positive S3 gallops, no carotid bruit  Gastrointestinal: Positive bowel sounds, abdomen is soft, nontender and nondistended  : No palpable bladder  Musculoskeletal: No edema, no clubbing or cyanosis  Psychiatric: Flat affect, cooperative  Neurologic: Awake and alert, moving all extremities, normal speech and mental status  Skin: Warm and dry       Scheduled Meds:     aspirin, 81 mg, Oral, Daily  insulin lispro, 2-9 Units, Subcutaneous, 4x Daily AC & at Bedtime  isosorbide mononitrate, 60 mg, Oral, Daily  lisinopril, 5 mg, Oral,  Daily  metoprolol tartrate, 25 mg, Oral, BID  pantoprazole, 40 mg, Oral, Daily  prasugrel, 5 mg, Oral, Daily  rosuvastatin, 10 mg, Oral, Daily  senna-docusate sodium, 2 tablet, Oral, BID      IV Meds:        Results Reviewed:   I have personally reviewed the results from the time of this admission to 7/13/2023 13:49 EDT     Lab Results   Component Value Date    GLUCOSE 101 (H) 07/13/2023    CALCIUM 8.8 07/13/2023     07/13/2023    K 5.0 07/13/2023    CO2 30.0 (H) 07/13/2023    CL 98 07/13/2023    BUN 46 (H) 07/13/2023    CREATININE 2.86 (H) 07/13/2023    EGFRIFAFRI 26 (L) 12/14/2021    EGFRIFNONA 23 (L) 12/14/2021    BCR 16.1 07/13/2023    ANIONGAP 11.0 07/13/2023      Lab Results   Component Value Date    MG 2.2 07/13/2023    ALBUMIN 3.8 07/13/2023     US Abdomen Limited    Result Date: 7/13/2023  Examination: Renal sonogram  TECHNIQUE: Sonographic images of the kidneys and urinary bladder were obtained  HISTORY:Nausea  COMPARISON: 03/25/2016  FINDINGS: The pancreatic head and body are normal, with mildly dilated, 3 mm pancreatic duct. The visualized portions of the proximal IVC and aorta are normal. There is coarsened hepatic echotexture, without mass seen in visualized portions. The portal vein is patent, with antegrade flow. The common duct is dilated, measuring 1.2 cm, previously 0.4 cm. The right kidney is echogenic, without hydronephrosis, measuring approximately 6.5 cm with a simple appearing upper pole cyst measuring 4.5 cm. Additional simple appearing right renal cysts are seen.      Impression: 1. Interval common duct dilation. Correlate with history and laboratory findings and ERCP or MRCP if clinically indicated 2. Prominent pancreatic duct. Correlate with the above 3. Coarsened hepatic echotexture, compatible with hepatocellular disease, without mass seen in visualized portions 4. Echogenic kidneys, compatible with renal parenchymal disease, with simple appearing cysts, not requiring follow-up.   This report was finalized on 7/13/2023 12:50 PM by Dr. Francisco Bhatt M.D.        Assessment / Plan       CHF (congestive heart failure)    DDD (degenerative disc disease), cervical    Cervical pain (neck)      ASSESSMENT:  Acute kidney injury on chronic kidney disease most likely related to cardiorenal syndrome also having decreased renal perfusion and hypotension in the presence of ACE inhibitor which impedes autoregulation and leading to probably ATN.  Creatinine today up to 2.86, potassium is 5, total CO2 30 and uric acid 9.4  Shock could be cardiogenic  Ischemic cardiomyopathy, picture consistent with congestive heart failure, the patient had an echocardiogram done today but I do not have measurement of ejection fraction at this time.  Coronary artery disease  Frailty  Dyslipidemia  Degenerative disc disease  Anemia probably associate with chronic kidney disease but other etiologies need to be considered such as multiple myeloma, and iron deficiency anemia    PLAN:  Check random urine for sodium, chloride and protein to creatinine ratio  Check iron stores and immunofixation and free light chains  Recommended transfer to ICU where she can be started on pressors to support her blood pressure and hopefully improve renal perfusion and that will allow us to diurese  After blood pressures stabilizes I will consider diuretics  Will hold off ACE inhibitor for now until renal function is back to its baseline and at that time even though I will exercise extreme caution before I will reinstitute ACE inhibitor or ARB  Surveillance labs and will check uric acid and use it as a marker for effective intravascular volume, I will check renal function panel now and decide if any other intervention is needed.    Thank you for involving us in the care of Marilynn Gil.  Please feel free to call with any questions.    George Chappell MD  07/13/23  13:49 EDT    [facility]  [phone]      Please  note that portions of this note were completed with a voice recognition program.

## 2023-07-13 NOTE — CONSULTS
Group: [facility]         CONSULT NOTE    Patient Identification:    Marilynn Gil  88 y.o.  female  1935  [id number]            Patient Care Team:  Pam Charles APRN as PCP - General (Internal Medicine)  Joycelyn Zpaata MD (Inactive) as PCP - Family Medicine    Requesting physician: Dr. Benedict    Reason for Consultation: Concern for cardiogenic shock, NYLA on CKD    CC: Shortness of breath    History of Present Illness: Patient is a 88-year-old white female with a past medical history significant for CAD, CKD 4, previous CVA, DVT, diabetes who was admitted to the hospital with neck and shoulder pain as well as shortness of breath and noted to have respiratory failure and congestive heart failure and is being managed by cardiology service and noted to have hypotension with diuresis felt to be indicated for congestive heart failure and had worsening renal failure now and nephrology was consulted the same and is in need for persistent diuresis and need for hemodynamic support in order to help with the same.  Patient noted to have quite an echocardiogram but that is pending.  All blood pressure medications are being discontinued per nephrology recommendations and they have requested transfer to ICU for close monitoring and aggressive care.  We have been asked to evaluate the patient for the same.    During my evaluation patient states that she continues to have severe neck pain and shoulder pain which is bothering her a lot and does have some shortness of breath and is requiring supplemental oxygen and states that she is concerned about her transferred to the ICU and is asking if she is okay.  States that she does not want very aggressive care and discussing goals of care with her family.  Overall understands the concerns and the plan.    I have reviewed the H&P as well as the notes from the other consultants taking care of the patient this admission as well as previous hospital notes (if any  available) from our group physicians and summarized above      Objective     Review of Systems:  Constitutional: No fever, chills, weight loss or loss of appetite.   ENMT: No sinus congestion, postnasal drip, epistaxis, sore throat  Cardiovascular: No chest pain, palpitation or legs swelling.    Respiratory: No hemoptysis. +  orthopnea & PND.  Gastrointestinal: No constipation, diarrhea or abdominal pain   Neurology: No headache, focal weakness, numbness or dizziness.   Musculoskeletal: No joint stiffness or swelling.   Psychiatry: No agitation or behavioral changes  Lymphatic: No swollen neck glands.  Integumentary: No rash.    Past Medical History:  Past Medical History:   Diagnosis Date    Anxiety     CAD (coronary artery disease) 1985    estimated time frame    CRI (chronic renal insufficiency), stage 4 (severe)     CVA (cerebral vascular accident)     Essential hypertension, benign     Essential tremor     GERD (gastroesophageal reflux disease)     History of DVT (deep vein thrombosis)     Impaired glucose metabolism     unclear how long this has been present, but only as of '22 did pt meet dx criteria for DM T2    Insomnia     Mesenteric ischemia, chronic     Mixed hyperlipidemia     Myocardial infarct 2012    Neuropathy     both upper ext mainly hands after neck fx    Osteoporosis     Pancreatitis     hx of    Type 2 diabetes mellitus with hyperglycemia 2022    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    CAD predates DM by decades    Type II diabetes mellitus with renal manifestations 2010    estimated time frame, precedes DM by many years    Venous thrombosis and embolism        Past Surgical History:  Past Surgical History:   Procedure Laterality Date    BACK SURGERY      x 4    BREAST MASS EXCISION      Benign    CATARACT EXTRACTION Bilateral     CHOLECYSTECTOMY  08/2013    COLONOSCOPY  12/2012    scarred mucosa,internal hemorrhoids    COLONOSCOPY  09/12/2012    Patent side to side ileo colonic  anastomosis, one 7mm polyp in the ascending colon, erythematous and vascular pattern decreased mucosa in the transverse colon, scarred mucosa in the rectum, NBIH.  PATH: Tubular adenoma, Patchy mild chronic inflammation     CORONARY ANGIOPLASTY      MI 1985, s/p angioplasty 1987    ENDOSCOPY  12/06/2013    z line irreg 38cm from the incisors, tortuous esophagus, HH, gastritis, bilious gastric fluid, duodenitis.  PATH: Gastric mucosa with minimal chornic infalmmation and with histologic features suggestive of reactive gastropathy.     PANCREAS SURGERY  05/2013    Pancreatic duct stricture, stent placed    RECTAL PROLAPSE REPAIR  2007    Rectopexy and sigmoidectomy, s/p rectal prolapse repair x 2 prior to 2007    SPHINCTEROTOMY  05/2013        Home Meds:  Medications Prior to Admission   Medication Sig Dispense Refill Last Dose    Acetaminophen (TYLENOL PO) Take 500 mg by mouth Every 6 (Six) Hours As Needed (pain).   7/10/2023 at 0900    albuterol sulfate  (90 Base) MCG/ACT inhaler Inhale 2 puffs Every 4 (Four) Hours As Needed for Wheezing. 18 g 11 7/10/2023    aspirin 81 MG chewable tablet Chew 1 tablet Daily.   7/11/2023 at 0900    Diclofenac Sodium (VOLTAREN) 1 % gel gel Apply 4 g topically to the appropriate area as directed 4 (Four) Times a Day As Needed (pain). 150 g 1 7/10/2023    furosemide (LASIX) 20 MG tablet Take 1 tablet by mouth Daily. 90 tablet 1 7/11/2023 at 0900    isosorbide mononitrate (IMDUR) 60 MG 24 hr tablet 1 tablet Daily.  3 7/11/2023 at 0900    lisinopril (PRINIVIL,ZESTRIL) 5 MG tablet Take 1 tablet by mouth Daily.   7/11/2023 at 0900    metoprolol tartrate (LOPRESSOR) 25 MG tablet Take 1 tablet by mouth 2 (Two) Times a Day. 180 tablet 3 Patient Taking Differently at 0900    NITROSTAT 0.4 MG SL tablet 1 tab sublingual every 5 minutes x 3 as needed for chest pressure  0     pantoprazole (PROTONIX) 40 MG EC tablet Take 1 tablet by mouth Daily. 90 tablet 0 7/11/2023 at 0900    prasugrel  "(EFFIENT) tablet Take 1 tablet by mouth Daily.   2023 at 0900    rosuvastatin (CRESTOR) 10 MG tablet Take 1 tablet by mouth Daily. 90 tablet 2 7/10/2023 at 2200    Vibegron (GEMTESA PO) Take  by mouth Daily.   7/10/2023 at 2200    zolpidem (AMBIEN) 10 MG tablet TAKE 1 TABLET BY MOUTH AT NIGHT AS NEEDED FOR SLEEP 30 tablet 2 7/10/2023 at 2200    phenazopyridine (Pyridium) 200 MG tablet Take 1 tablet by mouth 3 (Three) Times a Day As Needed for Bladder Spasms. 6 tablet 0     potassium chloride (K-DUR,KLOR-CON) 10 MEQ CR tablet Take 1 tablet by mouth Daily. (Patient not taking: Reported on 2023) 90 tablet 1     traMADol (ULTRAM) 50 MG tablet Take 1 tablet by mouth Every 4 (Four) Hours As Needed for Moderate Pain. 60 tablet 0        Allergies:  Allergies   Allergen Reactions    Codeine Nausea And Vomiting    Gabapentin Other (See Comments)     Did not tolerate    Lyrica [Pregabalin] Other (See Comments)     Did not tolerate    Other Nausea And Vomiting     Darvocet    Shellfish-Derived Products Other (See Comments)     Cant remember    Sulfa Antibiotics Other (See Comments)     Can't remember       Social History:   Social History     Socioeconomic History    Marital status:     Number of children: 2   Tobacco Use    Smoking status: Former     Packs/day: 2.00     Years: 20.00     Pack years: 40.00     Types: Cigarettes     Quit date:      Years since quittin.5    Smokeless tobacco: Never   Vaping Use    Vaping Use: Never used   Substance and Sexual Activity    Alcohol use: No    Drug use: No    Sexual activity: Defer       Family History:  Family History   Problem Relation Age of Onset    Cancer Brother     Pancreatitis Paternal Aunt     Diabetes Father        Physical Exam:  BP (!) 85/49 (BP Location: Right arm, Patient Position: Lying)   Pulse 69   Temp 98.4 °F (36.9 °C) (Oral)   Resp 18   Ht 147.3 cm (58\")   Wt 43.1 kg (95 lb)   SpO2 98%   BMI 19.86 kg/m²    Body mass index is 19.86 " kg/m². 98% 43.1 kg (95 lb)    Vent Settings                                           Physical Exam     Constitutional: Elderly white female pt in bed,+ accessory muscle use/resp distress   Head: - NCAT  Eyes: No pallor, anicteric conjunctivae  ENMT:  Mallampati 2, no oral thrush. Moist MM.   NECK: Trachea midline, No thyromegaly, no palpable cervical lymphadenopathy.  Left neck/shoulder discomfort  Heart: RRR, no murmur. trace pedal edema   Lungs: MITCHELL +, No wheezes/. + Basilar crackles heard    Abdomen: Soft. No tenderness, guarding or rigidity. No palpable masses  Extremities: Extremities warm and well perfused. No cyanosis/ clubbing  Neuro: Conscious, answers appropriately, no gross focal neuro deficits  Psych: Mood and affect appropriate    PPE recommended per [facility] infectious disease Isolation protocol for the current clinical scenario (as mentioned below) was followed.     LABS:  Results from last 7 days   Lab Units 07/13/23  0450 07/12/23  1451 07/12/23  0424 07/11/23  1236   SODIUM mmol/L 139  --  144 142   POTASSIUM mmol/L 5.0  --  4.0 4.1   CHLORIDE mmol/L 98  --  102 103   CO2 mmol/L 30.0*  --  29.1* 28.7   BUN mg/dL 46*  --  31* 28*   CREATININE mg/dL 2.86*  --  1.76* 1.86*   CALCIUM mg/dL 8.8  --  9.2 9.2   BILIRUBIN mg/dL 0.3 0.3  --  0.3   ALK PHOS U/L 131* 135*  --  136*   ALT (SGPT) U/L 13 11  --  12   AST (SGOT) U/L 28 24  --  24   GLUCOSE mg/dL 101*  --  99 108*   WBC 10*3/mm3 7.33  --  6.01 7.04   HEMOGLOBIN g/dL 10.3*  --  10.4* 10.4*   PLATELETS 10*3/mm3 214  --  187 204   INR   --   --   --  0.93   PROBNP pg/mL  --   --   --  3,635.0*   PROCALCITONIN ng/mL  --   --   --  0.10       Lab Results   Component Value Date    CALCIUM 8.8 07/13/2023                    Result Review      I have personally reviewed the results from the time of this admission to 7/13/2023 15:37 EDT and agree with these findings:  [x]  Laboratory  [x]  Microbiology  [x]  Radiology  []  EKG/Telemetry   [x]   Cardiology/Vascular   []  Pathology  []  Old records  []  Other:    ASSESSMENT  Acute hypoxic Respiratory failure  Acute congestive heart failure  Acute cardiogenic shock  NYLA on CKD  Ischemic cardiomyopathy; EF-40%; refused AICD  Worsening left neck/shoulder pain-pending MRI  Abnormal hepatobiliary ultrasound  S/p prior pancreatic duct stent  Anemia  CAD  Malnutrition  Advanced age    RECOMMENDATIONS:  Patient with acute respiratory failure likely from congestive heart failure in the setting of volume overload and unfortunately now with persistent hypotension limiting aggressive diuresis and concern for renal failure.  Given the above issues we will discontinue all antihypertensive medications and reassess the patient.  Low threshold to start vasopressor support.  Defer diuretic plan to nephrology.  Cardiology has been notified about the plan and will defer inotrope choice to them in order to help with aggressive diuresis.  Renal dysfunction noted and await further input from nephrology service.  Repeat echocardiogram is pending  Patient continues to have severe left neck/shoulder pain.  MRI is pending.  We will increase pain medications and use fentanyl given issues with hypotension  Does have abnormal hepatobiliary ultrasound but LFTs are not significantly abnormal and patient has previous pancreatic dysfunction with stent placement.  Would defer work-up until acute issues resolved  Anemia stable and will be monitored with no signs of bleeding  DVT prophylaxis  Guarded prognosis    Long discussion with the patient and family at bedside they all agree that she would like to get full treatment but switch her goals of care to DNR/DNI.  All questions answered to the best of my ability and emotional support provided    CC- 32 mins    Thank you for letting me participate in the care of this pleasant patient.  I have discussed my findings and recommendations with patient, family, and nursing staff.     Teodoro Raines  MD Gee  7/13/2023  15:37 EDT

## 2023-07-13 NOTE — THERAPY EVALUATION
Patient Name: Marilynn Gil  : 1935    MRN: [id number]                              Today's Date: 2023       Admit Date: 2023    Visit Dx:     ICD-10-CM ICD-9-CM   1. Acute congestive heart failure, unspecified heart failure type  I50.9 428.0   2. Hypoxia  R09.02 799.02   3. 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    Type 2 diabetes mellitus with hyperglycemia    Mixed hyperlipidemia    Essential hypertension, benign    CRI (chronic renal insufficiency), stage 4 (severe)    Personal history of other venous thrombosis and embolism    Femoral fracture    Chronic bronchitis    Dysuria    Oxygen desaturation    CHF (congestive heart failure)    DDD (degenerative disc disease), cervical    Cervical pain (neck)     Past Medical History:   Diagnosis Date    Anxiety     CAD (coronary artery disease)     estimated time frame    CRI (chronic renal insufficiency), stage 4 (severe)     CVA (cerebral vascular accident)     Essential hypertension, benign     Essential tremor     GERD (gastroesophageal reflux disease)     History of DVT (deep vein thrombosis)     Impaired glucose metabolism     unclear how long this has been present, but only as of  did pt meet dx criteria for DM T2    Insomnia     Mesenteric ischemia, chronic     Mixed hyperlipidemia     Myocardial infarct     Neuropathy     both upper ext mainly hands after neck fx    Osteoporosis     Pancreatitis     hx of    Type 2 diabetes mellitus with hyperglycemia     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     CAD predates DM by decades    Type II diabetes mellitus with renal  manifestations 2010    estimated time frame, precedes DM by many years    Venous thrombosis and embolism      Past Surgical History:   Procedure Laterality Date    BACK SURGERY      x 4    BREAST MASS EXCISION      Benign    CATARACT EXTRACTION Bilateral     CHOLECYSTECTOMY  08/2013    COLONOSCOPY  12/2012    scarred mucosa,internal hemorrhoids    COLONOSCOPY  09/12/2012    Patent side to side ileo colonic anastomosis, one 7mm polyp in the ascending colon, erythematous and vascular pattern decreased mucosa in the transverse colon, scarred mucosa in the rectum, NBIH.  PATH: Tubular adenoma, Patchy mild chronic inflammation     CORONARY ANGIOPLASTY      MI 1985, s/p angioplasty 1987    ENDOSCOPY  12/06/2013    z line irreg 38cm from the incisors, tortuous esophagus, HH, gastritis, bilious gastric fluid, duodenitis.  PATH: Gastric mucosa with minimal chornic infalmmation and with histologic features suggestive of reactive gastropathy.     PANCREAS SURGERY  05/2013    Pancreatic duct stricture, stent placed    RECTAL PROLAPSE REPAIR  2007    Rectopexy and sigmoidectomy, s/p rectal prolapse repair x 2 prior to 2007    SPHINCTEROTOMY  05/2013      General Information       [facility] Name 07/13/23 [facility]          Physical Therapy Time and Intention    Document Type evaluation  -     Mode of Treatment co-treatment;physical therapy  -       Row Name 07/13/23 1435          General Information    Patient Profile Reviewed yes  -     Prior Level of Function independent:  -     Existing Precautions/Restrictions fall  -       Row Name 07/13/23 1435          Living Environment    People in Home alone  -       Row Name 07/13/23 1435          Cognition    Orientation Status (Cognition) oriented x 4  -[facility] Name 07/13/23 1435          Safety Issues, Functional Mobility    Impairments Affecting Function (Mobility) balance;endurance/activity tolerance;strength;pain;range of motion (ROM)  -               User Key  (r) =  Recorded By, (t) = Taken By, (c) = Cosigned By      Initials Name Provider Type    Kimberly Blanton, PT Physical Therapist                   Mobility       Row Name 07/13/23 1435          Bed Mobility    Bed Mobility supine-sit  -     Supine-Sit [place] (Bed Mobility) minimum assist (75% patient effort)  -       Row Name 07/13/23 1435          Sit-Stand Transfer    Sit-Stand [place] (Transfers) minimum assist (75% patient effort);contact guard  -     Assistive Device (Sit-Stand Transfers) walker, front-wheeled  -       Row Name 07/13/23 1435          Gait/Stairs (Locomotion)    [place] Level (Gait) minimum assist (75% patient effort)  -     Assistive Device (Gait) walker, front-wheeled  -     Distance in Feet (Gait) 20 ft to stretcher  -     Deviations/Abnormal Patterns (Gait) antalgic;stride length decreased;gait speed decreased  -     Comment, (Gait/Stairs) 1 rest break secondary to fatigue and pain in LLE  -               User Key  (r) = Recorded By, (t) = Taken By, (c) = Cosigned By      Initials Name Provider Type    Kimberly Blanton, PT Physical Therapist                   Obj/Interventions       Row Name 07/13/23 1436          Range of Motion Comprehensive    Comment, General Range of Motion BLE WFL  -       Row Name 07/13/23 1436          Strength Comprehensive (MMT)    Comment, General Manual Muscle Testing (MMT) Assessment generalized weakness  -       Row Name 07/13/23 1436          Balance    Balance Assessment standing dynamic balance  -     Static Sitting Balance standby assist  -     Position, Sitting Balance unsupported;sitting edge of bed  -     Static Standing Balance contact guard  -     Dynamic Standing Balance contact guard;minimal assist  -     Position/Device Used, Standing Balance walker, front-wheeled  -       Row Name 07/13/23 1436          Sensory Assessment (Somatosensory)    Sensory Assessment (Somatosensory) other (see  comments)  Pt reports decreased sensation in Lfoot and R calf  -KH               User Key  (r) = Recorded By, (t) = Taken By, (c) = Cosigned By      Initials Name Provider Type    Kimberly Blanton, PT Physical Therapist                   Goals/Plan       Row Name 07/13/23 1438          Bed Mobility Goal 1 (PT)    Activity/Assistive Device (Bed Mobility Goal 1, PT) bed mobility activities, all  -KH     [place] Level/Cues Needed (Bed Mobility Goal 1, PT) independent  -KH     Time Frame (Bed Mobility Goal 1, PT) 1 week  -KH       Row Name 07/13/23 1438          Transfer Goal 1 (PT)    Activity/Assistive Device (Transfer Goal 1, PT) transfers, all;walker, rolling  -KH     [place] Level/Cues Needed (Transfer Goal 1, PT) standby assist  -KH     Time Frame (Transfer Goal 1, PT) 1 week  -       Row Name 07/13/23 1438          Gait Training Goal 1 (PT)    Activity/Assistive Device (Gait Training Goal 1, PT) gait (walking locomotion);walker, rolling  -KH     [place] Level (Gait Training Goal 1, PT) standby assist  -KH     Distance (Gait Training Goal 1, PT) 100 ft  -KH     Time Frame (Gait Training Goal 1, PT) 1 week  -       Row Name 07/13/23 1438          Patient Education Goal (PT)    Activity (Patient Education Goal, PT) HEP  -KH     [place]/Cues/Accuracy (Memory Goal 2, PT) demonstrates adequately  -KH     Time Frame (Patient Education Goal, PT) 1 week  -       Row Name 07/13/23 1438          Therapy Assessment/Plan (PT)    Planned Therapy Interventions (PT) balance training;bed mobility training;gait training;home exercise program;strengthening;stair training;transfer training;patient/family education  -KH               User Key  (r) = Recorded By, (t) = Taken By, (c) = Cosigned By      Initials Name Provider Type    Kimberly Blanton, PT Physical Therapist                   Clinical Impression       Row Name 07/13/23 1437          Pain    Pretreatment Pain Rating 5/10  -KH      Posttreatment Pain Rating 5/10  -     Pain Location - Side/Orientation Left  -     Pain Location lower  -     Pain Location - extremity  -     Pain Intervention(s) Repositioned;Rest  -       Row Name 07/13/23 1430          Plan of Care Review    Plan of Care Reviewed With patient  -     Outcome Evaluation Pt was admitted with neck and L shoulder pain along with SOA and wheezing. Pt reports she lives alone and ambulates with Rwx. Pt has h/o L femur fracture and is WBAT per chart. Ortho has seen for R knee pain. GIOVANY was consulted for neck pain but pt has been cleared to participate with therapies. Pt presents with pain, generalized weakness, limited activity tolerance and unsteady gait. Pt would benefit from PT to address these impairments. Recommend home with  PT vs SNF pending progress.  -       Row Name 07/13/23 1437          Therapy Assessment/Plan (PT)    Patient/Family Therapy Goals Statement (PT) return to PLOF  -     Rehab Potential (PT) good, to achieve stated therapy goals  -     Criteria for Skilled Interventions Met (PT) yes  -     Therapy Frequency (PT) 6 times/wk  -       Row Name 07/13/23 1430          Positioning and Restraints    Pre-Treatment Position in bed  -     Post Treatment Position other  -     Other Position --  stretcher with transport  -               User Key  (r) = Recorded By, (t) = Taken By, (c) = Cosigned By      Initials Name Provider Type    Kimberly Blanton, PT Physical Therapist                   Outcome Measures       Row Name 07/13/23 5459          How much help from another person do you currently need...    Turning from your back to your side while in flat bed without using bedrails? 3  -KH     Moving from lying on back to sitting on the side of a flat bed without bedrails? 3  -KH     Moving to and from a bed to a chair (including a wheelchair)? 3  -KH     Standing up from a chair using your arms (e.g., wheelchair, bedside chair)? 3   -KH     Climbing 3-5 steps with a railing? 2  -KH     To walk in hospital room? 3  -KH     AM-PAC 6 Clicks Score (PT) 17  -     Highest level of mobility 5 --> Static standing  -ANDRÉS       Row Name 07/13/23 1439          Functional Assessment    Outcome Measure Options AM-PAC 6 Clicks Basic Mobility (PT)  -               User Key  (r) = Recorded By, (t) = Taken By, (c) = Cosigned By      Initials Name Provider Type    Kimberly Blanton, PT Physical Therapist                                 Physical Therapy Education       Title: PT OT SLP Therapies (Not Started)       Topic: Physical Therapy (Not Started)       Point: Mobility training (Not Started)       Learner Progress:  Not documented in this visit.              Point: Home exercise program (Not Started)       Learner Progress:  Not documented in this visit.              Point: Body mechanics (Not Started)       Learner Progress:  Not documented in this visit.              Point: Precautions (Not Started)       Learner Progress:  Not documented in this visit.                                  PT Recommendation and Plan  Planned Therapy Interventions (PT): balance training, bed mobility training, gait training, home exercise program, strengthening, stair training, transfer training, patient/family education  Plan of Care Reviewed With: patient  Outcome Evaluation: Pt was admitted with neck and L shoulder pain along with SOA and wheezing. Pt reports she lives alone and ambulates with Rwx. Pt has h/o L femur fracture and is WBAT per chart. Ortho has seen for R knee pain. GIOVANY was consulted for neck pain but pt has been cleared to participate with therapies. Pt presents with pain, generalized weakness, limited activity tolerance and unsteady gait. Pt would benefit from PT to address these impairments. Recommend home with HH PT vs SNF pending progress.     Time Calculation:    PT Charges       Row Name 07/13/23 1439             Time Calculation    Start Time  1145  -      Stop Time 1156  -KH      Time Calculation (min) 11 min  -KH      PT Received On 07/13/23  -ANDRÉS      PT - Next Appointment 07/14/23  -      PT Goal Re-Cert Due Date 07/20/23  -         Time Calculation- PT    Total Timed Code Minutes- PT 11 minute(s)  -KH         Untimed Charges    PT Eval/Re-eval Minutes 11  -KH         Total Minutes    Untimed Charges Total Minutes 11  -KH       Total Minutes 11  -KH                User Key  (r) = Recorded By, (t) = Taken By, (c) = Cosigned By      Initials Name Provider Type    Kimberly Blanton, PT Physical Therapist                  Therapy Charges for Today       Code Description Service Date Service Provider Modifiers Qty    [id number] HC PT EVAL MOD COMPLEXITY 2 7/13/2023 Kimberly Corona, PT GP 1            PT G-Codes  Outcome Measure Options: AM-PAC 6 Clicks Basic Mobility (PT)  AM-PAC 6 Clicks Score (PT): 17  PT Discharge Summary  Anticipated Discharge Disposition (PT): home with assist, home with home health, skilled nursing facility    Kimberly Corona, PT  7/13/2023

## 2023-07-13 NOTE — THERAPY EVALUATION
Patient Name: Marilynn Gil  : 1935    MRN: [id number]                              Today's Date: 2023       Admit Date: 2023    Visit Dx:     ICD-10-CM ICD-9-CM   1. Acute congestive heart failure, unspecified heart failure type  I50.9 428.0   2. Hypoxia  R09.02 799.02   3. 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    Type 2 diabetes mellitus with hyperglycemia    Mixed hyperlipidemia    Essential hypertension, benign    CRI (chronic renal insufficiency), stage 4 (severe)    Personal history of other venous thrombosis and embolism    Femoral fracture    Chronic bronchitis    Dysuria    Oxygen desaturation    CHF (congestive heart failure)    DDD (degenerative disc disease), cervical    Cervical pain (neck)     Past Medical History:   Diagnosis Date    Anxiety     CAD (coronary artery disease)     estimated time frame    CRI (chronic renal insufficiency), stage 4 (severe)     CVA (cerebral vascular accident)     Essential hypertension, benign     Essential tremor     GERD (gastroesophageal reflux disease)     History of DVT (deep vein thrombosis)     Impaired glucose metabolism     unclear how long this has been present, but only as of  did pt meet dx criteria for DM T2    Insomnia     Mesenteric ischemia, chronic     Mixed hyperlipidemia     Myocardial infarct     Neuropathy     both upper ext mainly hands after neck fx    Osteoporosis     Pancreatitis     hx of    Type 2 diabetes mellitus with hyperglycemia     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     CAD predates DM by decades    Type II diabetes mellitus with renal  manifestations 2010    estimated time frame, precedes DM by many years    Venous thrombosis and embolism      Past Surgical History:   Procedure Laterality Date    BACK SURGERY      x 4    BREAST MASS EXCISION      Benign    CATARACT EXTRACTION Bilateral     CHOLECYSTECTOMY  08/2013    COLONOSCOPY  12/2012    scarred mucosa,internal hemorrhoids    COLONOSCOPY  09/12/2012    Patent side to side ileo colonic anastomosis, one 7mm polyp in the ascending colon, erythematous and vascular pattern decreased mucosa in the transverse colon, scarred mucosa in the rectum, NBIH.  PATH: Tubular adenoma, Patchy mild chronic inflammation     CORONARY ANGIOPLASTY      MI 1985, s/p angioplasty 1987    ENDOSCOPY  12/06/2013    z line irreg 38cm from the incisors, tortuous esophagus, HH, gastritis, bilious gastric fluid, duodenitis.  PATH: Gastric mucosa with minimal chornic infalmmation and with histologic features suggestive of reactive gastropathy.     PANCREAS SURGERY  05/2013    Pancreatic duct stricture, stent placed    RECTAL PROLAPSE REPAIR  2007    Rectopexy and sigmoidectomy, s/p rectal prolapse repair x 2 prior to 2007    SPHINCTEROTOMY  05/2013      General Information       Row Name 07/13/23 1344          OT Time and Intention    Document Type evaluation  -     Mode of Treatment occupational therapy;co-treatment  -       Row Name 07/13/23 7226          General Information    Patient Profile Reviewed yes  -SM     Prior Level of Function independent:;ADL's;all household mobility  -     Existing Precautions/Restrictions fall  -       Row Name 07/13/23 1300          Occupational Profile    Environmental Supports and Barriers (Occupational Profile) Pt uses a rwx in the home reports she only has 3 rooms and needs to only walk short distances  -       Row Name 07/13/23 1342          Living Environment    People in Home alone  -       Row Name 07/13/23 9193          Cognition    Orientation Status (Cognition) oriented  x 4  -[facility] Name 07/13/23 1345          Safety Issues, Functional Mobility    Safety Issues Affecting Function (Mobility) insight into deficits/self-awareness;awareness of need for assistance  -     Impairments Affecting Function (Mobility) balance;endurance/activity tolerance;strength;pain;range of motion (ROM)  -               User Key  (r) = Recorded By, (t) = Taken By, (c) = Cosigned By      Initials Name Provider Type     Anna Lopez OT Occupational Therapist                     Mobility/ADL's       [facility] 07/13/23 1346          Bed Mobility    Bed Mobility supine-sit  -     Supine-Sit [place] (Bed Mobility) minimum assist (75% patient effort)  -[facility] Name 07/13/23 1346          Transfers    Transfers sit-stand transfer  -SM       Row Name 07/13/23 1346          Sit-Stand Transfer    Sit-Stand [place] (Transfers) minimum assist (75% patient effort);contact guard  -     Assistive Device (Sit-Stand Transfers) walker, front-wheeled  -SM       Row Name 07/13/23 1346          Functional Mobility    Functional Mobility- Ind. Level contact guard assist  -     Functional Mobility- Device walker, front-wheeled  -     Functional Mobility-Distance (Feet) --  15  -     Functional Mobility- Comment slow pace, pain limiting  -SM       Row Name 07/13/23 1346          Activities of Daily Living    BADL Assessment/Intervention lower body dressing  -SM       Row Name 07/13/23 1346          Lower Body Dressing Assessment/Training    [place] Level (Lower Body Dressing) don;socks;dependent (less than 25% patient effort)  -     Position (Lower Body Dressing) edge of bed sitting  -               User Key  (r) = Recorded By, (t) = Taken By, (c) = Cosigned By      Initials Name Provider Type    Anna Crane OT Occupational Therapist                   Obj/Interventions       [facility] Name 07/13/23 1347          Sensory Assessment (Somatosensory)    Sensory Subjective Reports  --  pain radiating down LUE  -SM       Row Name 07/13/23 1347          Range of Motion Comprehensive    Comment, General Range of Motion RUE AROM WFL, L shoulder flex/abduction impaired ~50%. Elbow flex/ext and digit AROM WFL  -SM       Row Name 07/13/23 1347          Strength Comprehensive (MMT)    Comment, General Manual Muscle Testing (MMT) Assessment RUE 3+/5, L shoulder NT 2/2 pain, L elbow flex 3+/5 but pain with MMT.  is equal bilaterally.  -SM       Row Name 07/13/23 1347          Motor Skills    Motor Skills functional endurance  -     Functional Endurance poor +  -SM       Row Name 07/13/23 1347          Balance    Balance Assessment sitting static balance;standing static balance  -     Static Sitting Balance standby assist  -     Position, Sitting Balance sitting edge of bed  -     Static Standing Balance contact guard  -     Position/Device Used, Standing Balance supported;walker, rolling  -SM               User Key  (r) = Recorded By, (t) = Taken By, (c) = Cosigned By      Initials Name Provider Type    SM Anna Lopez OT Occupational Therapist                   Goals/Plan       Row Name 07/13/23 1510          Transfer Goal 1 (OT)    Activity/Assistive Device (Transfer Goal 1, OT) toilet;shower chair  -     [place] Level/Cues Needed (Transfer Goal 1, OT) supervision required  -SM     Time Frame (Transfer Goal 1, OT) short term goal (STG);2 weeks  -SM     Progress/Outcome (Transfer Goal 1, OT) goal ongoing  -SM       Row Name 07/13/23 1510          Bathing Goal 1 (OT)    [place] Level/Cues Needed (Bathing Goal 1, OT) standby assist  -SM     Time Frame (Bathing Goal 1, OT) short term goal (STG);2 weeks  -SM     Progress/Outcomes (Bathing Goal 1, OT) goal ongoing  -SM       Row Name 07/13/23 1510          Dressing Goal 1 (OT)    Activity/Device (Dressing Goal 1, OT) dressing skills, all  -SM     [place]/Cues Needed (Dressing Goal 1, OT) standby assist  -SM     Time  Frame (Dressing Goal 1, OT) short term goal (STG);2 weeks  -SM     Progress/Outcome (Dressing Goal 1, OT) goal ongoing  -       Row Name 07/13/23 1510          Toileting Goal 1 (OT)    Activity/Device (Toileting Goal 1, OT) toileting skills, all  -SM     [place] Level/Cues Needed (Toileting Goal 1, OT) standby assist  -SM     Time Frame (Toileting Goal 1, OT) short term goal (STG);2 weeks  -SM     Progress/Outcome (Toileting Goal 1, OT) goal ongoing  -       Row Name 07/13/23 1510          Therapy Assessment/Plan (OT)    Planned Therapy Interventions (OT) activity tolerance training;adaptive equipment training;BADL retraining;functional balance retraining;occupation/activity based interventions;patient/caregiver education/training;transfer/mobility retraining;strengthening exercise;ROM/therapeutic exercise  -               User Key  (r) = Recorded By, (t) = Taken By, (c) = Cosigned By      Initials Name Provider Type     Anna Lopez, SHELBIE Occupational Therapist                   Clinical Impression       Row Name 07/13/23 3423          Pain Assessment    Pre/Posttreatment Pain Comment 8/10 in neck/L shoulder, 5/10 R knee  -     Pain Intervention(s) Repositioned;Ambulation/increased activity  -       Row Name 07/13/23 5742          Plan of Care Review    Plan of Care Reviewed With patient  -     Outcome Evaluation Pt is a 88 y.o female admitted from home with fall, L shoulder pain and neck pain, SOB, CHF exacerbation. Pt with hx of L dital femur fx, WBAT from 6/2022. Pt had also been previously seeing ortho for R knee pain and L shoulder pain with injections. Pt has hx of neck pain, spinal fractures, hx of falling. Per [facility] notes, pt with multiple falls getting off the commode in/out of the tub. Pt lives alone, she uses a rwx. Pt today with decreased ability to raise L shoulder without pain. Also noted c spine MRI ordered in chart. Pt cleared for activity per [facility]. Limited assessment completed  today as transport came for pt while participating in OT eval and following testing pt was transfered to CCU. Pt does have a difficulty with mobility to the door to get to the stretcher and was unable to put on her socks before hand. She would benefit from continued OT. Recommend SNF at [place] to improve balance and safety with ADLs prior to returning home if pt is agreeable.  -       Row Name 07/13/23 1348          Therapy Assessment/Plan (OT)    Rehab Potential (OT) good, to achieve stated therapy goals  -     Criteria for Skilled Therapeutic Interventions Met (OT) yes;skilled treatment is necessary  -SM     Therapy Frequency (OT) 5 times/wk  -       Row Name 07/13/23 1348          Vital Signs    Pre SpO2 (%) 93  4L  -       Row Name 07/13/23 1348          Positioning and Restraints    Pre-Treatment Position in bed  -SM     Post Treatment Position other  -SM     Other Position --  transport  -               User Key  (r) = Recorded By, (t) = Taken By, (c) = Cosigned By      Initials Name Provider Type    Anna Crane, OT Occupational Therapist                   Outcome Measures       Row Name 07/13/23 1511          How much help from another is currently needed...    Putting on and taking off regular lower body clothing? 2  -SM     Bathing (including washing, rinsing, and drying) 2  -SM     Toileting (which includes using toilet bed pan or urinal) 2  -SM     Putting on and taking off regular upper body clothing 2  -SM     Taking care of personal grooming (such as brushing teeth) 3  -SM     Eating meals 3  -SM     AM-PAC 6 Clicks Score (OT) 14  -SM       Row Name 07/13/23 1439          How much help from another person do you currently need...    Turning from your back to your side while in flat bed without using bedrails? 3  -KH     Moving from lying on back to sitting on the side of a flat bed without bedrails? 3  -KH     Moving to and from a bed to a chair (including a wheelchair)? 3  -KH      Standing up from a chair using your arms (e.g., wheelchair, bedside chair)? 3  -KH     Climbing 3-5 steps with a railing? 2  -KH     To walk in hospital room? 3  -KH     AM-PAC 6 Clicks Score (PT) 17  -     Highest level of mobility 5 --> Static standing  -       Row Name 07/13/23 1511 07/13/23 1439       Functional Assessment    Outcome Measure Options AM-PAC 6 Clicks Daily Activity (OT)  - AM-PAC 6 Clicks Basic Mobility (PT)  -              User Key  (r) = Recorded By, (t) = Taken By, (c) = Cosigned By      Initials Name Provider Type    Kimberly Blanton, PT Physical Therapist    Anna Crane, OT Occupational Therapist                    Occupational Therapy Education       Title: PT OT SLP Therapies (In Progress)       Topic: Occupational Therapy (In Progress)       Point: ADL training (Done)       Description:   Instruct learner(s) on proper safety adaptation and remediation techniques during self care or transfers.   Instruct in proper use of assistive devices.                  Learning Progress Summary             Patient Acceptance, E, VU by  at 7/13/2023 1511    Comment: OT goals, briefly discussed dc recommendations                         Point: Home exercise program (Not Started)       Description:   Instruct learner(s) on appropriate technique for monitoring, assisting and/or progressing therapeutic exercises/activities.                  Learner Progress:  Not documented in this visit.              Point: Precautions (Not Started)       Description:   Instruct learner(s) on prescribed precautions during self-care and functional transfers.                  Learner Progress:  Not documented in this visit.              Point: Body mechanics (Not Started)       Description:   Instruct learner(s) on proper positioning and spine alignment during self-care, functional mobility activities and/or exercises.                  Learner Progress:  Not documented in this visit.                               User Key       Initials Effective Dates Name Provider Type Discipline     04/02/20 -  Anna Lopez OT Occupational Therapist OT                  OT Recommendation and Plan  Planned Therapy Interventions (OT): activity tolerance training, adaptive equipment training, BADL retraining, functional balance retraining, occupation/activity based interventions, patient/caregiver education/training, transfer/mobility retraining, strengthening exercise, ROM/therapeutic exercise  Therapy Frequency (OT): 5 times/wk  Plan of Care Review  Plan of Care Reviewed With: patient  Outcome Evaluation: Pt is a 88 y.o female admitted from home with fall, L shoulder pain and neck pain, SOB, CHF exacerbation. Pt with hx of L dital femur fx, WBAT from 6/2022. Pt had also been previously seeing ortho for R knee pain and L shoulder pain with injections. Pt has hx of neck pain, spinal fractures, hx of falling. Per [facility] notes, pt with multiple falls getting off the commode in/out of the tub. Pt lives alone, she uses a rwx. Pt today with decreased ability to raise L shoulder without pain. Also noted c spine MRI ordered in chart. Pt cleared for activity per [facility]. Limited assessment completed today as transport came for pt while participating in OT eval and following testing pt was transfered to CCU. Pt does have a difficulty with mobility to the door to get to the stretcher and was unable to put on her socks before hand. She would benefit from continued OT. Recommend SNF at [place] to improve balance and safety with ADLs prior to returning home if pt is agreeable.     Time Calculation:    Time Calculation- OT       Row Name 07/13/23 1512             Time Calculation- OT    OT Start Time 1146  -SM      OT Stop Time 1156  -      OT Time Calculation (min) 10 min  -SM      OT Received On 07/13/23  -      OT - Next Appointment 07/14/23  -         Untimed Charges    OT Eval/Re-eval Minutes 10  -SM         Total Minutes    Untimed  Charges Total Minutes 10  -SM       Total Minutes 10  -SM                User Key  (r) = Recorded By, (t) = Taken By, (c) = Cosigned By      Initials Name Provider Type    Anna Crane OT Occupational Therapist                  Therapy Charges for Today       Code Description Service Date Service Provider Modifiers Qty    [id number] HC OT EVAL MOD COMPLEXITY 3 7/13/2023 Anna Lopez OT GO 1                 Anna Lopez OT  7/13/2023

## 2023-07-13 NOTE — PROGRESS NOTES
Neurosurgery service has ordered MRI of the cervical spine.  We will evaluate once this is complete.  There is no need for cervical collar at this time.  Okay for PT/OT to see patient today.

## 2023-07-14 NOTE — PROGRESS NOTES
Nutrition Services    Patient Name:  Marilynn Gil  YOB: 1935  MRN: [id number]  Admit Date:  7/11/2023    Assessment Date:  07/14/23    Comment: Nutrition consult for MSA assessment. Pt reports a poor appetite for several days prior to admission. She has not lost any weight. Nutrition focused physical exam performed - no significant signs of muscle wasting or fat loss. Does not meet criteria fro malnutrition at this time.     Discussed po and nutrition supplement options.  She is agreeable to try some Boost. Will add with meals and follow clinical course, nutrition needs.      CLINICAL NUTRITION ASSESSMENT      Reason for Assessment Malnutrition Severity Assessment (MSA)     Diagnosis/Problem     CHF (congestive heart failure)    DDD (degenerative disc disease), cervical    Cervical pain (neck)  NYLA/CKD, cardiogenic shock, ischemic cardiomyopathy, CAD   Medical/Surgical History Past Medical History:   Diagnosis Date    Anxiety     CAD (coronary artery disease) 1985    estimated time frame    CRI (chronic renal insufficiency), stage 4 (severe)     CVA (cerebral vascular accident)     Essential hypertension, benign     Essential tremor     GERD (gastroesophageal reflux disease)     History of DVT (deep vein thrombosis)     Impaired glucose metabolism     unclear how long this has been present, but only as of '22 did pt meet dx criteria for DM T2    Insomnia     Mesenteric ischemia, chronic     Mixed hyperlipidemia     Myocardial infarct 2012    Neuropathy     both upper ext mainly hands after neck fx    Osteoporosis     Pancreatitis     hx of    Type 2 diabetes mellitus with hyperglycemia 2022    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    CAD predates DM by decades    Type II diabetes mellitus with renal manifestations 2010    estimated time frame, precedes DM by many years    Venous thrombosis and embolism        Past Surgical History:   Procedure Laterality Date    BACK SURGERY      x  "4    BREAST MASS EXCISION      Benign    CATARACT EXTRACTION Bilateral     CHOLECYSTECTOMY  08/2013    COLONOSCOPY  12/2012    scarred mucosa,internal hemorrhoids    COLONOSCOPY  09/12/2012    Patent side to side ileo colonic anastomosis, one 7mm polyp in the ascending colon, erythematous and vascular pattern decreased mucosa in the transverse colon, scarred mucosa in the rectum, NBIH.  PATH: Tubular adenoma, Patchy mild chronic inflammation     CORONARY ANGIOPLASTY      MI 1985, s/p angioplasty 1987    ENDOSCOPY  12/06/2013    z line irreg 38cm from the incisors, tortuous esophagus, HH, gastritis, bilious gastric fluid, duodenitis.  PATH: Gastric mucosa with minimal chornic infalmmation and with histologic features suggestive of reactive gastropathy.     PANCREAS SURGERY  05/2013    Pancreatic duct stricture, stent placed    RECTAL PROLAPSE REPAIR  2007    Rectopexy and sigmoidectomy, s/p rectal prolapse repair x 2 prior to 2007    SPHINCTEROTOMY  05/2013        Encounter Information        Nutrition History:  Hasn't eaten very much for several days   Food Preferences:    Supplements: Tried ensure once   Factors Affecting Intake: decreased appetite     Anthropometrics        Current Height  Current Weight  BMI kg/m2 Height: 147.3 cm (58\")  Weight: 47.2 kg (104 lb 0.9 oz) (07/14/23 0500)  Body mass index is 21.75 kg/m².   Adjusted BMI (if applicable)    BMI Category Normal/Healthy (18.4 - 24.9)       Admission Weight 104lb       Ideal Body Weight (IBW) 104lb   Adjusted IBW (if applicable)        Usual Body Weight (UBW) 100lb +/- 5 lbs per pt   Weight Change/Trend Stable       Weight History Wt Readings from Last 30 Encounters:   07/14/23 0500 47.2 kg (104 lb 0.9 oz)   07/13/23 1048 43.1 kg (95 lb)   07/13/23 0511 43.5 kg (95 lb 14.4 oz)   07/12/23 0518 44.9 kg (99 lb)   07/11/23 1135 47.2 kg (104 lb)   07/11/23 1035 47.2 kg (104 lb)   06/23/23 1449 45.9 kg (101 lb 3.2 oz)   06/16/23 1140 46.2 kg (101 lb 12.8 oz) "   04/26/23 1523 44 kg (97 lb)   04/12/23 1042 44 kg (97 lb)   02/24/23 1101 44.1 kg (97 lb 3.2 oz)   02/22/23 1258 44 kg (97 lb)   11/18/22 0750 45.5 kg (100 lb 6.4 oz)   10/11/22 1007 45.8 kg (101 lb)   08/26/22 1057 45.5 kg (100 lb 4.8 oz)   08/10/22 1144 45.4 kg (100 lb)   07/06/22 1621 40.8 kg (90 lb)   06/06/22 1520 43.5 kg (96 lb)   05/09/22 0927 43.6 kg (96 lb 1.6 oz)   03/28/22 1308 44 kg (97 lb)   03/21/22 1406 45.8 kg (101 lb)   12/14/21 1046 46.3 kg (102 lb)   11/23/21 1310 45.4 kg (100 lb)   06/11/21 1044 44.9 kg (99 lb)   03/02/20 1049 44.5 kg (98 lb)   01/07/20 1416 44.5 kg (98 lb)   10/31/19 1043 45.4 kg (100 lb)   06/14/19 1346 44.9 kg (99 lb)   05/02/19 0826 45.4 kg (100 lb)   11/07/18 1259 44.5 kg (98 lb)   10/02/18 1044 44.9 kg (99 lb)   06/21/18 1137 43.1 kg (95 lb)   05/17/18 0929 41.7 kg (92 lb)   04/16/18 1151 45.5 kg (100 lb 3.2 oz)           --  Tests/Procedures        Tests/Procedures Ultrasound, X-Ray     Labs       Pertinent Labs    Results from last 7 days   Lab Units 07/14/23  0825 07/13/23  1537 07/13/23  0450 07/12/23  1451   SODIUM mmol/L 136 139 139  --    POTASSIUM mmol/L 4.1 4.3 5.0  --    CHLORIDE mmol/L 97* 99 98  --    CO2 mmol/L 27.0 25.5 30.0*  --    BUN mg/dL 49* 47* 46*  --    CREATININE mg/dL 3.35* 3.19* 2.86*  --    CALCIUM mg/dL 8.6 8.3* 8.8  --    BILIRUBIN mg/dL 0.3  --  0.3 0.3   ALK PHOS U/L 125*  --  131* 135*   ALT (SGPT) U/L 10  --  13 11   AST (SGOT) U/L 25  --  28 24   GLUCOSE mg/dL 107* 78 101*  --      Results from last 7 days   Lab Units 07/14/23  0825 07/14/23  0824 07/13/23  1537 07/13/23  0450   MAGNESIUM mg/dL 2.2  --   --  2.2   PHOSPHORUS mg/dL 4.7*  --    < >  --    HEMOGLOBIN g/dL  --  10.4*  --  10.3*   HEMATOCRIT %  --  33.7*  --  34.2   WBC 10*3/mm3  --  8.65  --  7.33   ALBUMIN g/dL 3.4*  --    < > 3.8    < > = values in this interval not displayed.     Results from last 7 days   Lab Units 07/14/23  0824 07/13/23  0450 07/12/23  0424  07/11/23  1236   INR   --   --   --  0.93   PLATELETS 10*3/mm3 193 214 187 204     COVID19   Date Value Ref Range Status   07/11/2023 Not Detected Not Detected - Ref. Range Final     Lab Results   Component Value Date    HGBA1C 5.90 (H) 04/12/2023          Medications           Scheduled Medications aspirin, 81 mg, Oral, Daily  bumetanide, 4 mg, Intravenous, Q8H  insulin lispro, 2-9 Units, Subcutaneous, 4x Daily AC & at Bedtime  pantoprazole, 40 mg, Oral, Daily  prasugrel, 5 mg, Oral, Daily  rosuvastatin, 10 mg, Oral, Daily  senna-docusate sodium, 2 tablet, Oral, BID       Infusions DOBUTamine, 5 mcg/kg/min, Last Rate: 5 mcg/kg/min (07/14/23 0941)  norepinephrine, 0.02-0.3 mcg/kg/min, Last Rate: 0.2 mcg/kg/min (07/14/23 1102)       PRN Medications   acetaminophen    albuterol    senna-docusate sodium **AND** polyethylene glycol **AND** bisacodyl **AND** bisacodyl    dextrose    dextrose    diphenhydrAMINE    fentaNYL citrate (PF)    glucagon (human recombinant)    HYDROcodone-acetaminophen    HYDROmorphone    melatonin    ondansetron **OR** ondansetron    promethazine **OR** promethazine    [COMPLETED] Insert Peripheral IV **AND** sodium chloride    zolpidem     Physical Findings          Physical Appearance alert, frail, oriented, on oxygen therapy   Oral/Mouth Cavity WNL   Edema  1+ (trace)   Gastrointestinal nausea, vomiting   Skin  bruising   Tubes/Drains/Lines none   NFPE No clinical signs of muscle wasting or fat loss     Fat Loss Unable  None  Mild  Moderate Severe   Orbital    X    Upper Arm  X      Thoracic   X      Muscle Loss         Temple  X      Clavicle   X      Acromion   X      Scapular   X      Dorsal Hand   X      Patellar  X      Thigh   X      Calf   X        Current Nutrition Orders & Evaluation of Intake       Oral Nutrition     Food Allergies Shellfish   Current PO Diet Diet: Cardiac Diets, Diabetic Diets; Healthy Heart (2-3 Na+); Consistent Carbohydrate; Texture: Regular Texture (IDDSI 7);  Fluid Consistency: Thin (IDDSI 0)   Supplement n/a   PO Evaluation     % PO Intake 0% x 1 meal, 100% x 1 snack    # of Days Evaluated 1   --  PES STATEMENT / NUTRITION DIAGNOSIS      Nutrition Dx Problem  Problem: Inadequate Oral Intake  Etiology: Medical Diagnosis NYLA/CKD, cardiogenic shock, ischemic cardiomyopathy, CAD  Signs/Symptoms: Report of Minimal PO Intake    Comment:    --  NUTRITION INTERVENTION / PLAN OF CARE      Intervention Goal(s) Maintain nutrition status, Reduce/improve symptoms, Meet estimated needs, Disease management/therapy, Tolerate PO , Increase intake, and No significant weight loss         RD Intervention/Action Advise alternative selection, Advise available snack, Interview for preferences, Encourage intake, Follow Tx Progress, Care plan reviewed, and Recommend/ordered:          Prescription/Orders:       PO Diet       Supplements Boost plus with meals      Snacks       Enteral Nutrition       Parenteral Nutrition    New Prescription Ordered? Yes   --      Monitor/Evaluation Per protocol, I&O, PO intake, Supplement intake, Pertinent labs, Weight, Skin status, GI status, Symptoms, POC/GOC, Swallow function, Hemodynamic stability   Discharge Plan/Needs Pending clinical course   Education Will instruct as appropriate   --    RD to follow per protocol.    Electronically signed by:  Taisha Mercado RD  07/14/23 11:09 EDT

## 2023-07-14 NOTE — CONSULTS
"Purpose of the visit was to evaluate for: goals of care/advanced care planning and support for patient/family. Spoke with MD and RN as well as patient and family and discussed palliative care, goals of care, care options, resuscitation status, and [facility].      Assessment:  Patient is palliative care appropriate given (list diagnosis/symptoms):  acute respiratory failure, acute congestive heart failure, cardiogenic shock, NYLA on CKD, worsening, ischemic cardiomyopathy, worsening left neck/shoulder pain, malnutrition, CAD. Patient is alert and oriented although forgetful at times. She reports constant neck and shoulder pain which is managed at 5/10 with current pain regimen although the side effect of this itching for which she has required benadryl. Patient reports poor appetite and mobility significantly limited by acute illness. PPS 30%    Recommendations/Plan: No changes in treatment plan at this time. Code status is DNR/DNI. Patient does have living will but not available for review currently. Patient states her healthcare surrogate is her son Kirk. Patient/family would like to wait until Monday to decide goals of care and level of care, palliative care team will follow up then.     Other Comments: Spoke with patient and her granddaughter Loree at bedside and had son Kirk on speaker phone. We discussed goals of care, treatment options and code status in detail. Patient is very realistic about her outlook and states \"I know this is the end\" and \"I'm not going to get better\". She is interested in less invasive, palliative approach however for now is ok continuing current level of care as her son won't be in town until Monday. We did discuss option for inpatient palliative care, answered all questions and provided support. Discussed with SUMIT Cordero and Dr. Flynn.   "

## 2023-07-14 NOTE — PROGRESS NOTES
Teodoro Flynn MD                          [phone]            Patient ID:    Name:  Marilynn Gil    MRN:  [id number]    1935   88 y.o.  female            Patient Care Team:  Pam Charles APRN as PCP - General (Internal Medicine)  Joycelyn Zapata MD (Inactive) as PCP - Family Medicine    CC/ Reason for visit: Cardiogenic shock, congestive heart failure, NYLA on CKD    Subjective: Pt seen and examined this AM.  Remains on pressor support along with dobutamine.  Diuretics being started by nephrology this AM.  Await cardiology input still.  States that the neck pain is a little better but overall frustrated with care and lack of improvement.    ROS: Denies any subjective fevers, syncope or presyncopal events, new neurological deficits, nausea or vomiting currently    Objective     Vital Signs past 24hrs    BP range: BP: ()/(41-85) 127/61  Pulse range: Heart Rate:  [] 95  Resp rate range: Resp:  [14-18] 16  Temp range: Temp (24hrs), Av °F (36.7 °C), Min:97.4 °F (36.3 °C), Max:98.6 °F (37 °C)      Ventilator/Non-Invasive Ventilation Settings (From admission, onward)      None            Vent Settings                                         Device (Oxygen Therapy): high-flow nasal cannula;humidified       47.2 kg (104 lb 0.9 oz); Body mass index is 21.75 kg/m².      Intake/Output Summary (Last 24 hours) at 2023 1337  Last data filed at 2023 0800  Gross per 24 hour   Intake 460.06 ml   Output 0 ml   Net 460.06 ml       PHYSICAL EXAM   Constitutional: Elderly white female pt in bed,+ accessory muscle use/resp distress   Head: - NCAT  Eyes: No pallor, anicteric conjunctivae  ENMT:  Mallampati 2, no oral thrush. Moist MM.   NECK: Trachea midline, No thyromegaly, no palpable cervical lymphadenopathy.  Left neck/shoulder discomfort  Heart: RRR, no murmur. trace pedal edema   Lungs: MITCHELL +, No wheezes/. + Basilar crackles heard    Abdomen: Soft. No  tenderness, guarding or rigidity. No palpable masses  Extremities: Extremities warm and well perfused. No cyanosis/ clubbing  Neuro: Conscious, answers appropriately, no gross focal neuro deficits  Psych: Mood and affect appropriate    PPE recommended per [facility] infectious disease Isolation protocol for the current clinical scenario (as mentioned below) was followed.     Scheduled meds:  aspirin, 81 mg, Oral, Daily  bumetanide, 4 mg, Intravenous, Q8H  insulin lispro, 2-9 Units, Subcutaneous, 4x Daily AC & at Bedtime  pantoprazole, 40 mg, Oral, Daily  prasugrel, 5 mg, Oral, Daily  rosuvastatin, 10 mg, Oral, Daily  senna-docusate sodium, 2 tablet, Oral, BID        IV meds:                      DOBUTamine, 5 mcg/kg/min, Last Rate: 5 mcg/kg/min (07/14/23 0941)  norepinephrine, 0.02-0.3 mcg/kg/min, Last Rate: 0.2 mcg/kg/min (07/14/23 1102)        Data Review:      Results from last 7 days   Lab Units 07/14/23  0825 07/14/23  0824 07/13/23  1537 07/13/23  0450 07/12/23  1451 07/12/23  0424 07/11/23  1236   SODIUM mmol/L 136  --  139 139  --  144 142   POTASSIUM mmol/L 4.1  --  4.3 5.0  --  4.0 4.1   CHLORIDE mmol/L 97*  --  99 98  --  102 103   CO2 mmol/L 27.0  --  25.5 30.0*  --  29.1* 28.7   BUN mg/dL 49*  --  47* 46*  --  31* 28*   CREATININE mg/dL 3.35*  --  3.19* 2.86*  --  1.76* 1.86*   CALCIUM mg/dL 8.6  --  8.3* 8.8  --  9.2 9.2   BILIRUBIN mg/dL 0.3  --   --  0.3 0.3  --  0.3   ALK PHOS U/L 125*  --   --  131* 135*  --  136*   ALT (SGPT) U/L 10  --   --  13 11  --  12   AST (SGOT) U/L 25  --   --  28 24  --  24   GLUCOSE mg/dL 107*  --  78 101*  --  99 108*   WBC 10*3/mm3  --  8.65  --  7.33  --  6.01 7.04   HEMOGLOBIN g/dL  --  10.4*  --  10.3*  --  10.4* 10.4*   PLATELETS 10*3/mm3  --  193  --  214  --  187 204   INR   --   --   --   --   --   --  0.93   PROBNP pg/mL  --   --   --   --   --   --  3,635.0*   PROCALCITONIN ng/mL  --   --   --   --   --   --  0.10       Lab Results   Component Value Date     CALCIUM 8.6 07/14/2023    PHOS 4.7 (H) 07/14/2023                    I have personally reviewed the results from the time of this admission to 7/14/2023 13:37 EDT and agree with these findings:  [x]  Laboratory  [x]  Microbiology  [x]  Radiology  []  EKG/Telemetry   [x]  Cardiology/Vascular   []  Pathology  []  Old records    ASSESSMENT   Acute hypoxic Respiratory failure  Acute congestive heart failure  Acute cardiogenic shock  NYLA on CKD  Ischemic cardiomyopathy; EF-40%; refused AICD  Worsening left neck/shoulder pain-pending MRI  Abnormal hepatobiliary ultrasound  S/p prior pancreatic duct stent  Anemia  CAD  Malnutrition  Advanced age  DNR/DNI     RECOMMENDATIONS:  Patient stable from a respiratory standpoint.  Still on low-flow supplemental oxygen.  Concern for congestive heart failure with cardiogenic shock playing a role.  Continue with inotropic support currently on dobutamine as well as Levophed.  Would wean off dobutamine if cleared by cardiology.  Cardiology awaiting echocardiogram report and further recommendations.  Had to DC all antihypertensive medications.  Nephrology input noted and plan for diuresis noted.  Unfortunately worse renal function in this current situation.  Continue as needed pain medications while pending work-up for cervical spine disease and neurosurgery on board and recommendations noted.  Does have abnormal hepatobiliary ultrasound but LFTs are not significantly abnormal and patient has previous pancreatic dysfunction with stent placement.  Would defer work-up until acute issues resolved  Anemia stable and will be monitored with no signs of bleeding  DVT prophylaxis  Guarded prognosis     Patient and family have confirmed DNR/DNI.  Does not seem to be interested in aggressive care for now.  Would get palliative care if any further decline.  All questions answered to the best of my ability and emotional support provided     CC- 32 mins    Teodoro Flynn MD  7/14/2023

## 2023-07-14 NOTE — NURSING NOTE
"Patient refusing bumex injection, states \"I am here because I am peeing a lot already, why would I take a medication that makes me pee more?\" Explained reasoning for bumex order, continues to refuse. Nephrology notified.  Patient is also refusing glucose checks, stating that \"I am not and have never been a diabetic. I am tired of you all poking me\". Intensivist notified of patient's decision.  "

## 2023-07-14 NOTE — PROGRESS NOTES
[facility] Progress Note      Patient Name: Marilynn Gil  : 1935  MRN: [id number]  Primary Care Physician:  Pam Charles APRN  Date of admission: 2023    Subjective     Interval History:   Follow-up acute kidney injury on chronic kidney disease    The patient was transferred to ICU because of hypotension currently on dobutamine and norepinephrine blood pressure about 120/50 she is feeling somewhat better, she has shortness of breath and increased JVD she denies any nausea or vomiting or abdominal pain.  No urine output is reported    Review of Systems:   As noted above    Objective     Vitals:   Temp:  [97.4 °F (36.3 °C)-98.6 °F (37 °C)] 98.1 °F (36.7 °C)  Heart Rate:  [] 102  Resp:  [14-18] 16  BP: ()/(41-85) 119/53  Flow (L/min):  [2-8] 5    Intake/Output Summary (Last 24 hours) at 2023 1019  Last data filed at 2023 0800  Gross per 24 hour   Intake 460.06 ml   Output 0 ml   Net 460.06 ml       Physical Exam:    General Appearance: alert, chronically ill and frail, no acute distress   Skin: warm and dry  HEENT: oral mucosa normal, nonicteric sclera  Neck: Increased JVD  Lungs: Bilateral rhonchi and crackles, breathing effort not labored  Heart: RRR, normal S1 and S2 positive S3, no rub  Abdomen: soft, nontender, nondistended, normoactive bowel  : no palpable bladder  Extremities: no edema, cyanosis or clubbing  Neuro: normal speech and mental status     Scheduled Meds:     aspirin, 81 mg, Oral, Daily  insulin lispro, 2-9 Units, Subcutaneous, 4x Daily AC & at Bedtime  pantoprazole, 40 mg, Oral, Daily  prasugrel, 5 mg, Oral, Daily  rosuvastatin, 10 mg, Oral, Daily  senna-docusate sodium, 2 tablet, Oral, BID      IV Meds:   DOBUTamine, 5 mcg/kg/min, Last Rate: 5 mcg/kg/min (23 0941)  norepinephrine, 0.02-0.3 mcg/kg/min, Last Rate: 0.15 mcg/kg/min (23 0644)        Results Reviewed:   I have personally reviewed the results from the  time of this admission to 7/14/2023 10:19 EDT     Results from last 7 days   Lab Units 07/14/23  0825 07/13/23  1537 07/13/23  0450 07/12/23  1451   SODIUM mmol/L 136 139 139  --    POTASSIUM mmol/L 4.1 4.3 5.0  --    CHLORIDE mmol/L 97* 99 98  --    CO2 mmol/L 27.0 25.5 30.0*  --    BUN mg/dL 49* 47* 46*  --    CREATININE mg/dL 3.35* 3.19* 2.86*  --    CALCIUM mg/dL 8.6 8.3* 8.8  --    BILIRUBIN mg/dL 0.3  --  0.3 0.3   ALK PHOS U/L 125*  --  131* 135*   ALT (SGPT) U/L 10  --  13 11   AST (SGOT) U/L 25  --  28 24   GLUCOSE mg/dL 107* 78 101*  --        Estimated Creatinine Clearance: 9 mL/min (A) (by C-G formula based on SCr of 3.35 mg/dL (H)).    Results from last 7 days   Lab Units 07/14/23  0825 07/13/23  1537 07/13/23  0450   MAGNESIUM mg/dL 2.2  --  2.2   PHOSPHORUS mg/dL 4.7* 5.6*  --        Results from last 7 days   Lab Units 07/14/23  0825 07/13/23  0450   URIC ACID mg/dL 9.9* 9.4*       Results from last 7 days   Lab Units 07/14/23  0824 07/13/23  0450 07/12/23  0424 07/11/23  1236   WBC 10*3/mm3 8.65 7.33 6.01 7.04   HEMOGLOBIN g/dL 10.4* 10.3* 10.4* 10.4*   PLATELETS 10*3/mm3 193 214 187 204       Results from last 7 days   Lab Units 07/11/23  1236   INR  0.93       Assessment / Plan     ASSESSMENT:  Acute kidney injury on chronic kidney disease most likely related to cardiorenal syndrome also having decreased renal perfusion and hypotension in the presence of ACE inhibitor which impedes autoregulation and leading to probably ATN.  Creatinine is worse today up to 3.35, electrolyte within acceptable range, uric acid 9.9 it is associated with hypotension and cardiogenic shock.  Cardiogenic shock currently on vasopressors and dobutamine  Ischemic cardiomyopathy, picture consistent with congestive heart failure, the patient had an echocardiogram done today but I do not have measurement of ejection fraction at this time.  I reviewed study but unable to tell exactly what the ejection fraction waiting for  cardiology to report that  Coronary artery disease  Frailty  Dyslipidemia  Degenerative disc disease  Anemia probably associate with chronic kidney disease but other etiologies need to be considered such as multiple myeloma, and iron deficiency anemia    PLAN:  To continue the same treatment and pressors  Will try to diurese with IV bumetanide  Surveillance labs to monitor for diuretic toxicity     I reviewed the chart and reviewed other providers notes and reviewed the imaging and lab data independently  I discussed the case with the patient and her granddaughter at the bedside also with the patient's nurse    Copied text in this note has been reviewed and is accurate as of 07/14/23.       Thank you for involving us in the care of Marilynn Gil.  Please feel free to call with any questions.    George Chappell MD  07/14/23  10:19 EDT    [facility]  [phone]    Please note that portions of this note were completed with a voice recognition program.

## 2023-07-14 NOTE — PROGRESS NOTES
[facility] NEUROSURGERY CERVICAL PROGRESS NOTE      CC: Neck pain, left shoulder proximal arm pain and weakness      Subjective     Interval History: Patient transferred to yesterday due to hypotension.  Currently on dobutamine and norepinephrine.    ROS:  Constitutional: No fever, chills  Neck: Mild neck pain   GI: No nausea, vomiting, no swallow difficulties  Neuro: No numbness, tingling, left proximal arm weakness  : no difficulty voiding, no incontinence    Objective     Vital signs in last 24 hours:  Temp:  [97.4 °F (36.3 °C)-98.6 °F (37 °C)] 98.1 °F (36.7 °C)  Heart Rate:  [] 105  Resp:  [14-18] 16  BP: ()/(41-85) 99/52    Intake/Output this shift:  I/O this shift:  In: 60 [P.O.:60]  Out: 0     LABS:  .  Results from last 7 days   Lab Units 07/14/23  0824 07/13/23  0450 07/12/23  0424   WBC 10*3/mm3 8.65 7.33 6.01   HEMOGLOBIN g/dL 10.4* 10.3* 10.4*   HEMATOCRIT % 33.7* 34.2 33.6*   PLATELETS 10*3/mm3 193 214 187     .  Results from last 7 days   Lab Units 07/14/23  0825 07/13/23  1537 07/13/23  0450 07/12/23  1451   SODIUM mmol/L 136 139 139  --    POTASSIUM mmol/L 4.1 4.3 5.0  --    CHLORIDE mmol/L 97* 99 98  --    CO2 mmol/L 27.0 25.5 30.0*  --    BUN mg/dL 49* 47* 46*  --    CREATININE mg/dL 3.35* 3.19* 2.86*  --    CALCIUM mg/dL 8.6 8.3* 8.8  --    BILIRUBIN mg/dL 0.3  --  0.3 0.3   ALK PHOS U/L 125*  --  131* 135*   ALT (SGPT) U/L 10  --  13 11   AST (SGOT) U/L 25  --  28 24   GLUCOSE mg/dL 107* 78 101*  --          IMAGING STUDIES:  No new neuroimaging-awaiting MRI cervical spine    I personally viewed and interpreted the patient's labs, medications and chart.    Meds reviewed/changed: Yes    Current Facility-Administered Medications:     acetaminophen (TYLENOL) tablet 650 mg, 650 mg, Oral, Q4H PRN, Lacey Garibay MD, 650 mg at 07/11/23 3367    albuterol (PROVENTIL) nebulizer solution 0.083% 2.5 mg/3mL, 2.5 mg, Nebulization, Q6H PRN, Lacey Garibay MD    aspirin chewable  tablet 81 mg, 81 mg, Oral, Daily, Lacey Garibay MD, 81 mg at 07/14/23 0825    sennosides-docusate (PERICOLACE) 8.6-50 MG per tablet 2 tablet, 2 tablet, Oral, BID, 2 tablet at 07/13/23 2201 **AND** polyethylene glycol (MIRALAX) packet 17 g, 17 g, Oral, Daily PRN **AND** bisacodyl (DULCOLAX) EC tablet 5 mg, 5 mg, Oral, Daily PRN **AND** bisacodyl (DULCOLAX) suppository 10 mg, 10 mg, Rectal, Daily PRN, Lacey Garibay MD    bumetanide (BUMEX) injection 4 mg, 4 mg, Intravenous, Q8H, George Chappell MD    dextrose (D50W) (25 g/50 mL) IV injection 25 g, 25 g, Intravenous, Q15 Min PRN, Lacey Garibay MD    dextrose (GLUTOSE) oral gel 15 g, 15 g, Oral, Q15 Min PRN, Lacey Garibay MD    diphenhydrAMINE (BENADRYL) 12.5 MG/5ML elixir 25 mg, 25 mg, Oral, Q6H PRN, Teodoro Flynn MD, 25 mg at 07/14/23 0932    DOBUTamine (DOBUTREX) 1 mg/mL infusion, 5 mcg/kg/min, Intravenous, Titrated, Francisco Romero, APRN, Last Rate: 12.93 mL/hr at 07/14/23 0941, 5 mcg/kg/min at 07/14/23 0941    fentaNYL citrate (PF) (SUBLIMAZE) injection 12.5 mcg, 12.5 mcg, Intravenous, Q4H PRN, Teodoro Flynn MD, 12.5 mcg at 07/14/23 0937    glucagon (GLUCAGEN) injection 1 mg, 1 mg, Intramuscular, Q15 Min PRN, Lacey Garibay MD    HYDROcodone-acetaminophen (NORCO) 5-325 MG per tablet 1 tablet, 1 tablet, Oral, Q6H PRN, Lacey Garibay MD, 1 tablet at 07/13/23 1137    HYDROmorphone (DILAUDID) injection 0.5 mg, 0.5 mg, Intravenous, Q2H PRN, Jose Mejia MD, 0.5 mg at 07/13/23 1254    insulin lispro (HUMALOG/ADMELOG) injection 2-9 Units, 2-9 Units, Subcutaneous, 4x Daily AC & at Bedtime, Lacey Garibay MD    melatonin tablet 3 mg, 3 mg, Oral, Nightly PRN, Lacey Garibay MD, 3 mg at 07/11/23 2253    norepinephrine (LEVOPHED) 8 mg in 250 mL NS infusion (premix), 0.02-0.3 mcg/kg/min, Intravenous, Titrated, Francisco Romero, APRN, Last Rate: 16.16 mL/hr at 07/14/23 1102,  0.2 mcg/kg/min at 07/14/23 1102    ondansetron (ZOFRAN) tablet 4 mg, 4 mg, Oral, Q4H PRN **OR** ondansetron (ZOFRAN) injection 4 mg, 4 mg, Intravenous, Q4H PRN, Teodoro Flynn MD    pantoprazole (PROTONIX) EC tablet 40 mg, 40 mg, Oral, Daily, Lacey Garibay MD, 40 mg at 07/14/23 0825    prasugrel (EFFIENT) tablet 5 mg, 5 mg, Oral, Daily, Lacey Garibay MD, 5 mg at 07/14/23 0825    promethazine (PHENERGAN) tablet 12.5 mg, 12.5 mg, Oral, Q6H PRN, 12.5 mg at 07/12/23 1050 **OR** promethazine (PHENERGAN) suppository 12.5 mg, 12.5 mg, Rectal, Q6H PRN, Jose Mejia MD    rosuvastatin (CRESTOR) tablet 10 mg, 10 mg, Oral, Daily, Lacey Garibay MD, 10 mg at 07/14/23 0825    [COMPLETED] Insert Peripheral IV, , , Once **AND** sodium chloride 0.9 % flush 10 mL, 10 mL, Intravenous, PRN, Carlton España MD    zolpidem (AMBIEN) tablet 5 mg, 5 mg, Oral, Nightly PRN, Jose Mejia MD, 5 mg at 07/13/23 2201      Physical Exam:    General:   Awake, alert.  Neck:    Range of motion deferred.    Motor:    Left proximal arm strength seems to have improved slightly.  No fasciculations, rigidity, spasticity, or abnormal movements.  Reflexes:   2+ in the upper and lower extremities.  Juárez's present on left upper extremity   sensation:   Normal to light touch  Coordination:   Able to tandem walk. Romberg negative.  Station and Gait:             Per OT note 7/13/2023:Pt does have a difficulty with mobility to the door to get to the stretcher and was unable to put on her socks before hand. She would benefit from continued OT. Recommend SNF at [place] to improve balance and safety with ADLs prior to returning home if pt is agreeable.  Per PT note 7/13/2023:rosita Pt reports she lives alone and ambulates with Rwx. Pt has h/o L femur fracture and is WBAT per chart. Ortho has seen for R knee pain. GIOVANY was consulted for neck pain but pt has been cleared to participate with therapies. Pt presents with pain,  "generalized weakness, limited activity tolerance and unsteady gait. Pt would benefit from PT to address these impairments. Recommend home with HH PT vs SNF pending progress.   Extremities:   SCD in place    Assessment & Plan     ASSESSMENT:      CHF (congestive heart failure)    DDD (degenerative disc disease), cervical    Cervical pain (neck)    Patient with a history of back pain with history of lumbar surgery as well as neck pain with a history of cervical spine fractures.  Her current neck pain is chronic and patient states today that she is not feeling any worse at this point.  The patient was transferred to the CCU for management of hypotension and she is not obtained the MRI of the cervical spine.  Neurosurgery will sign off at this point and we can be called after imaging is obtained.      PLAN:     Neurosurgery signing off at the moment  Awaiting cervical MRI    I discussed the patient's findings and my recommendations with patient, family, and Dr. Gilliland    During patient visit, I utilized appropriate personal protective equipment including mask and gloves.  Mask used was standard procedure mask. Appropriate PPE was worn during the entire visit.  Hand hygiene was completed before and after.      LOS: 3 days       Cruz Caruso, APRN  7/14/2023  11:03 EDT    \"Dictated utilizing Dragon dictation\".    "

## 2023-07-14 NOTE — PROGRESS NOTES
Marilynn Gil   88 y.o.  female    LOS: 3 days   Patient Care Team:  Pam Charles APRN as PCP - General (Internal Medicine)  Joycelyn Zapata MD (Inactive) as PCP - Family Medicine      Subjective     Interval History:     Patient Complaints: more soa today    Review of Systems:       Medication Review:   Current Facility-Administered Medications:     acetaminophen (TYLENOL) tablet 650 mg, 650 mg, Oral, Q4H PRN, Lacey Garibay MD, 650 mg at 07/11/23 2254    albuterol (PROVENTIL) nebulizer solution 0.083% 2.5 mg/3mL, 2.5 mg, Nebulization, Q6H PRN, Lacey Garibay MD    aspirin chewable tablet 81 mg, 81 mg, Oral, Daily, Lacey Garibay MD, 81 mg at 07/14/23 0825    sennosides-docusate (PERICOLACE) 8.6-50 MG per tablet 2 tablet, 2 tablet, Oral, BID, 2 tablet at 07/13/23 2201 **AND** polyethylene glycol (MIRALAX) packet 17 g, 17 g, Oral, Daily PRN **AND** bisacodyl (DULCOLAX) EC tablet 5 mg, 5 mg, Oral, Daily PRN **AND** bisacodyl (DULCOLAX) suppository 10 mg, 10 mg, Rectal, Daily PRN, Lacey Garibay MD    dextrose (D50W) (25 g/50 mL) IV injection 25 g, 25 g, Intravenous, Q15 Min PRN, Lacey Garibay MD    dextrose (GLUTOSE) oral gel 15 g, 15 g, Oral, Q15 Min PRN, Lacey Garibay MD    diphenhydrAMINE (BENADRYL) 12.5 MG/5ML elixir 25 mg, 25 mg, Oral, Q6H PRN, Teodoro Flynn MD, 25 mg at 07/14/23 0357    DOBUTamine (DOBUTREX) 1 mg/mL infusion, 5 mcg/kg/min, Intravenous, Titrated, Francisco Romero APRN, Last Rate: 12.93 mL/hr at 07/13/23 1742, 5 mcg/kg/min at 07/13/23 1742    fentaNYL citrate (PF) (SUBLIMAZE) injection 12.5 mcg, 12.5 mcg, Intravenous, Q4H PRN, Teodoro Flynn MD, 12.5 mcg at 07/14/23 0551    glucagon (GLUCAGEN) injection 1 mg, 1 mg, Intramuscular, Q15 Min PRN, Lacey Garibay MD    HYDROcodone-acetaminophen (NORCO) 5-325 MG per tablet 1 tablet, 1 tablet, Oral, Q6H PRN, Lacey Garibay MD, 1 tablet at 07/13/23 1136     HYDROmorphone (DILAUDID) injection 0.5 mg, 0.5 mg, Intravenous, Q2H PRN, Jose Mejia MD, 0.5 mg at 07/13/23 1254    insulin lispro (HUMALOG/ADMELOG) injection 2-9 Units, 2-9 Units, Subcutaneous, 4x Daily AC & at Bedtime, Lacey Garibay MD    melatonin tablet 3 mg, 3 mg, Oral, Nightly PRN, Lacey Garibay MD, 3 mg at 07/11/23 2253    norepinephrine (LEVOPHED) 8 mg in 250 mL NS infusion (premix), 0.02-0.3 mcg/kg/min, Intravenous, Titrated, Francisco Romero, APRN, Last Rate: 12.12 mL/hr at 07/14/23 0644, 0.15 mcg/kg/min at 07/14/23 0644    ondansetron (ZOFRAN) tablet 4 mg, 4 mg, Oral, Q6H PRN **OR** ondansetron (ZOFRAN) injection 4 mg, 4 mg, Intravenous, Q6H PRN, Lacey Garibay MD, 4 mg at 07/14/23 0827    pantoprazole (PROTONIX) EC tablet 40 mg, 40 mg, Oral, Daily, Lacey Garibay MD, 40 mg at 07/14/23 0825    prasugrel (EFFIENT) tablet 5 mg, 5 mg, Oral, Daily, Lacey Garibay MD, 5 mg at 07/14/23 0825    promethazine (PHENERGAN) tablet 12.5 mg, 12.5 mg, Oral, Q6H PRN, 12.5 mg at 07/12/23 1050 **OR** promethazine (PHENERGAN) suppository 12.5 mg, 12.5 mg, Rectal, Q6H PRN, Jose Mejia MD    rosuvastatin (CRESTOR) tablet 10 mg, 10 mg, Oral, Daily, StingLacey watts MD, 10 mg at 07/14/23 0825    [COMPLETED] Insert Peripheral IV, , , Once **AND** sodium chloride 0.9 % flush 10 mL, 10 mL, Intravenous, PRN, Carlton España MD    zolpidem (AMBIEN) tablet 5 mg, 5 mg, Oral, Nightly PRN, Jose Mejia MD, 5 mg at 07/13/23 2201      Objective   Vital Sign Min/Max for last 24 hours  Temp  Min: 97.4 °F (36.3 °C)  Max: 98.6 °F (37 °C)   BP  Min: 58/41  Max: 127/57    Pulse  Min: 69  Max: 120     Wt Readings from Last 3 Encounters:   07/14/23 47.2 kg (104 lb 0.9 oz)   07/11/23 47.2 kg (104 lb)   06/23/23 45.9 kg (101 lb 3.2 oz)        Intake/Output Summary (Last 24 hours) at 7/14/2023 0832  Last data filed at 7/14/2023 0500  Gross per 24 hour   Intake 400.06 ml   Output  0 ml   Net 400.06 ml     Physical Exam:      General Appearance:    Well developed and well nourished elderly female with soa at rest on o2   Head:    Normocephalic, atraumatic   Eyes:            Conjunctivae normal, anicteric, no xanthelasma   Neck:   supple, trachea midline, no thyromegaly, no carotid bruit, +JVD, + elevated CVP   Lungs:     Clear to auscultation ant with accessory muscle use    Heart:    Regular rhythm and normal rate, normal S1 and S2,            No murmur, no gallop, no rub, no click   Chest Wall:    No abnormalities observed   Abdomen:     Normal bowel sounds, no masses, no organomegaly, soft        nontender, nondistended, no guarding, no rebound                tenderness   Rectal:     Deferred   Extremities:   No edema. Moves all extremities well, no cyanosis, no erythema   Pulses:   Pulses palpable and equal bilaterally   Skin:   No bleeding, bruising or rash   Neurologic:   awake alert and oriented x3, speech clear and approp, no facial drooping     : voids   Monitor:  sinus tach    Results Review:         Sodium Sodium   Date Value Ref Range Status   07/13/2023 139 136 - 145 mmol/L Final   07/13/2023 139 136 - 145 mmol/L Final   07/12/2023 144 136 - 145 mmol/L Final   07/11/2023 142 136 - 145 mmol/L Final      Potassium Potassium   Date Value Ref Range Status   07/13/2023 4.3 3.5 - 5.2 mmol/L Final     Comment:     Slight hemolysis detected by analyzer. Results may be affected.   07/13/2023 5.0 3.5 - 5.2 mmol/L Final     Comment:     Slight hemolysis detected by analyzer. Results may be affected.   07/12/2023 4.0 3.5 - 5.2 mmol/L Final   07/11/2023 4.1 3.5 - 5.2 mmol/L Final      Chloride Chloride   Date Value Ref Range Status   07/13/2023 99 98 - 107 mmol/L Final   07/13/2023 98 98 - 107 mmol/L Final   07/12/2023 102 98 - 107 mmol/L Final   07/11/2023 103 98 - 107 mmol/L Final      Bicarbonate No results found for: PLASMABICARB   BUN BUN   Date Value Ref Range Status   07/13/2023 47  (H) 8 - 23 mg/dL Final   07/13/2023 46 (H) 8 - 23 mg/dL Final   07/12/2023 31 (H) 8 - 23 mg/dL Final   07/11/2023 28 (H) 8 - 23 mg/dL Final      Creatinine Creatinine   Date Value Ref Range Status   07/13/2023 3.19 (H) 0.57 - 1.00 mg/dL Final   07/13/2023 2.86 (H) 0.57 - 1.00 mg/dL Final   07/12/2023 1.76 (H) 0.57 - 1.00 mg/dL Final   07/11/2023 1.86 (H) 0.57 - 1.00 mg/dL Final      Calcium Calcium   Date Value Ref Range Status   07/13/2023 8.3 (L) 8.6 - 10.5 mg/dL Final   07/13/2023 8.8 8.6 - 10.5 mg/dL Final   07/12/2023 9.2 8.6 - 10.5 mg/dL Final   07/11/2023 9.2 8.6 - 10.5 mg/dL Final      Magnesium Magnesium   Date Value Ref Range Status   07/13/2023 2.2 1.6 - 2.4 mg/dL Final        Results from last 7 days   Lab Units 07/13/23  0450   WBC 10*3/mm3 7.33   HEMOGLOBIN g/dL 10.3*   HEMATOCRIT % 34.2   PLATELETS 10*3/mm3 214     Lab Results   Lab Value Date/Time    TROPONINT 44 (H) 07/12/2023 0424    TROPONINT 43 (H) 07/12/2023 0012    TROPONINT 35 (H) 07/11/2023 2205    TROPONINT 40 (H) 07/11/2023 1236            Echo EF Estimated  No results found for: ECHOEFEST      Assessment/ Plan  Assessment & Plan   Active Hospital Problems    Diagnosis  POA    DDD (degenerative disc disease), cervical [M50.30]  Yes     Priority: Low    Cervical pain (neck) [M54.2]  Yes     Priority: Low    CHF (congestive heart failure) [I50.9]  Yes     Priority: Low       1. ICM  2. Acute on chronic HFrEF  Stage D class IV    A. 2009 and a catheterization. Ejection fraction was 40%. There was no discrete clot in her apex, which had been noted years ago. There was an 80 percent circumflex stenosis, which was stented. She had a previous LAD PTCA, which was patent.    B. Per office note- refuses AICD  3. CAD  4. Hypoxia  5. Falls   6. Neck and shoulder pain  7. HLD  8. DDD  9. Chronic pancreatitis   10.  Acute kidney injury on chronic kidney disease most likely related to cardiorenal syndrome   Renal foll  Creat 3.19     PLAN  Now hypotensive  with difficulty diuresing on levophed and Dobutrex, sys bp 115 on gtts and await renal today for diuresis  Discussed AICD with pt and family, they will consider  Echo complete and results pending.   MRI C spine pending      Melly Gr, APRN  07/14/23  08:32 EDT    Pt seen and examined

## 2023-07-14 NOTE — PLAN OF CARE
Goal Outcome Evaluation:      Dobutrex and levophed started to assist with Hypotension and potentially low EF.  Cardiology consulted, will read Echo in am.  No u/o from 1445 till 1900, bladder scanned 315, urine labs pending purwick in place.  Oncoming shift informed of renal labs needed, MRI on hold due to unstable status and abd U/S previously ordered can be done at bedside.  Lactic 1.1.  CgressRN

## 2023-07-14 NOTE — PLAN OF CARE
Goal Outcome Evaluation:           Progress: no change  Outcome Evaluation: Patient aox4, requiring levo and dobutamine gtt to maintain SBP within range. Fentanyl pushes PRN for pain w/ least side effects. Benedryl given Q6H d/t constant complain of itching. Patient refusing diuretics and glucose checks, MD's made aware. Palliative visit today, determined GOC meeting to f/u on Monday. Patient c/o increased pain and nausea, unable to work w/ PT today. HFNC decreased to 5L, maintain sats >90%

## 2023-07-15 NOTE — PLAN OF CARE
Goal Outcome Evaluation:pt had an episode of SVT, she then began to seize. Provider and care team bagged patient as she became unresponsive and apneic. Pt flipped herself into a fib, regained consciousness and  later came to sinus rhythm. Cardiology consulted. See orders

## 2023-07-15 NOTE — PROGRESS NOTES
[facility] Progress Note      Patient Name: Marilynn Gil  : 1935  MRN: [id number]  Primary Care Physician:  Pam Charles APRN  Date of admission: 2023    Subjective     Interval History:   F/u NYLA    Review of Systems:   +  Went into SVT earlier, became diaphoretic and had seizure  Now alert, SBP 90s, on levophed, 6L NC O2    Objective     Vitals:   Temp:  [98 °F (36.7 °C)-98.4 °F (36.9 °C)] 98.2 °F (36.8 °C)  Heart Rate:  [] 105  Resp:  [16-20] 16  BP: ()/(46-88) 133/64  Flow (L/min):  [6-10] 6    Intake/Output Summary (Last 24 hours) at 7/15/2023 1004  Last data filed at 2023 2100  Gross per 24 hour   Intake 398 ml   Output 400 ml   Net -2 ml       Physical Exam:    General Appearance: frail WF on NC O2  Neck: supple, no JVD  Lungs: Coarse BS  Heart: RRR, normal S1 and S2  Abdomen: soft, nontender, nondistended  : pure wick 700 cc  Extremities: no edema, cyanosis or clubbing      Scheduled Meds:     adenosine, , ,   aspirin, 81 mg, Oral, Daily  bumetanide, 4 mg, Intravenous, Q8H  insulin lispro, 2-9 Units, Subcutaneous, 4x Daily AC & at Bedtime  metoprolol tartrate, 5 mg, Intravenous, Q6H  pantoprazole, 40 mg, Oral, Daily  prasugrel, 5 mg, Oral, Daily  rosuvastatin, 10 mg, Oral, Daily  senna-docusate sodium, 2 tablet, Oral, BID      IV Meds:   DOBUTamine, 5 mcg/kg/min, Last Rate: 5 mcg/kg/min (07/15/23 0509)  norepinephrine, 0.02-0.3 mcg/kg/min, Last Rate: 0.1 mcg/kg/min (07/15/23 0930)        Results Reviewed:   I have personally reviewed the results from the time of this admission to 7/15/2023 10:04 EDT     Results from last 7 days   Lab Units 07/15/23  0402 23  0825 23  1537 23  0450 23  1451   SODIUM mmol/L 137 136 139 139  --    POTASSIUM mmol/L 4.0 4.1 4.3 5.0  --    CHLORIDE mmol/L 100 97* 99 98  --    CO2 mmol/L 27.0 27.0 25.5 30.0*  --    BUN mg/dL 45* 49* 47* 46*  --    CREATININE mg/dL 2.65* 3.35* 3.19*  2.86*  --    CALCIUM mg/dL 8.4* 8.6 8.3* 8.8  --    BILIRUBIN mg/dL  --  0.3  --  0.3 0.3   ALK PHOS U/L  --  125*  --  131* 135*   ALT (SGPT) U/L  --  10  --  13 11   AST (SGOT) U/L  --  25  --  28 24   GLUCOSE mg/dL 114* 107* 78 101*  --      Estimated Creatinine Clearance: 11.5 mL/min (A) (by C-G formula based on SCr of 2.65 mg/dL (H)).  Results from last 7 days   Lab Units 07/15/23  0402 07/14/23  0825 07/13/23  1537 07/13/23  0450   MAGNESIUM mg/dL 2.2 2.2  --  2.2   PHOSPHORUS mg/dL 3.7 4.7* 5.6*  --      Results from last 7 days   Lab Units 07/15/23  0402 07/14/23  0825 07/13/23  0450   URIC ACID mg/dL 9.5* 9.9* 9.4*     Results from last 7 days   Lab Units 07/15/23  0402 07/14/23  0824 07/13/23  0450 07/12/23  0424 07/11/23  1236   WBC 10*3/mm3 7.74 8.65 7.33 6.01 7.04   HEMOGLOBIN g/dL 9.9* 10.4* 10.3* 10.4* 10.4*   PLATELETS 10*3/mm3 217 193 214 187 204     Results from last 7 days   Lab Units 07/11/23  1236   INR  0.93       Assessment / Plan     ASSESSMENT:  Olig NYLA, CKD stage 4 (BL Cr 1.8 to 2) - suspect UOP under-recorded, Cr improved 3.3 to 2.6.  K/HCo3 normal.  Vol excess improved s/p IV bumex series. CXR today w/o central rachel  Cardiogenic shock currently on levophed, SBP high 90s, off dobutamine due to SVT   CAD + ischemic cardiomyopathy, EF 35 to 40% on echo  Acute hypoxic resp failure, on 6L NC O2  SVT   Acute encephalopathy - ? Seizure related to SVT episode ; mentation better currently   Degenerative disc disease  Anemia, hgb down slightly 9.9  DNR/DNI status    PLAN:  Hold diuretics given hemodynamic instability this AM  Prognosis remains guarded; palliative care eval noted from yesterday  D/W RN      Magdiel Umana MD  07/15/23  10:04 EDT    [facility]  [phone]

## 2023-07-15 NOTE — PROGRESS NOTES
LOS: 4 days   Patient Care Team:  Pam Charles APRN as PCP - General (Internal Medicine)  Joycelyn Zapata MD (Inactive) as PCP - Family Medicine    Subjective     I am picking up pulmonary coverage from Dr. Sanz of the reviewed his and other records.  Patient was transferred to the ICU with concerns for cardiogenic shock with acute kidney injury on chronic kidney disease and shortness of breath she has a history of coronary disease stage IV chronic kidney disease, prior CVA, DVTs, diabetes, pancreatic duct stent and hepatobiliary abnormalities who presented to hospital with neck and shoulder pain and shortness of breath was found to be in respiratory failure and CHF and hypotensive after diuresis.  I was called stat to the patient's bedside she had developed some SVT rate in the 200s for the first minute or 2 apparently she was asymptomatic and then she had what appeared to be a seizure like activity to nursing on my arrival she was unresponsive was not really breathing ashen white heart rate was in the 200s looked regular but hard to tell and that rate.  He is a DNR/DNI I went ahead and bag mask ventilated her she started dropping her heart rate into the 40s maintained a pulse.  After several minutes of bag mask ventilation she started breathing on her own and after a few more minutes she has awakened and she is conversant and able to follow simple commands.  Placed an oral airway while we were bagging her and she woke up and asked if she wanted it removed she nodded her head yes we took it out she said thank you that was choking me.  She is denying any chest pain currently.  Her heart rate came back up into the 130s and what look like A-fib irregularly irregular with no definite P waves then she dropped back into the 80s with what looked to be a sinus rhythm with some occasional PVCs.  She is maintaining that rhythm her blood pressures in the 140s systolic.  We turn her oxygen up to about 15 L  she satting 95-97% on this seems to be going up as she is waking up..  Review of Systems:   She is denying chest pain or shortness of breath currently no nausea.       Objective     Vital Signs  Vital Sign Min/Max for last 24 hours  Temp  Min: 98 °F (36.7 °C)  Max: 98.4 °F (36.9 °C)   BP  Min: 81/56  Max: 150/70   Pulse  Min: 89  Max: 115   Resp  Min: 16  Max: 20   SpO2  Min: 89 %  Max: 99 %   Flow (L/min)  Min: 5  Max: 10   Weight  Min: 47.8 kg (105 lb 6.1 oz)  Max: 47.8 kg (105 lb 6.1 oz)        Ventilator/Non-Invasive Ventilation Settings (From admission, onward)      None                         Body mass index is 22.02 kg/m².  I/O last 3 completed shifts:  In: 858.1 [P.O.:120; I.V.:738.1]  Out: 400 [Urine:400]  No intake/output data recorded.        Physical Exam:  General Appearance: Thin elderly white female who looks chronically ill when I first walked in the room she again was not breathing heart rate in the 200s at very quickly dropped into the 40s she has pinked up some now she is breathing spontaneously she is awake and answering some simple questions moving her extremities to command  Eyes: Conjunctiva are clear and anicteric pupils were about 2 mm that are now about 2-1/2 to 3 mm they are equal and reactive to light  ENT: Oral mucous membranes are little dry no erythema no exudates, nasal septum midline  Neck: Trachea midline she does have prominent jugular veins lying down but when I set her up to about 60 degrees they disappeared.  Lungs: She is a little decreased in the bases I do not hear any definite rales or rhonchi.  Respirations are pretty deep and a little bit labored right now respiratory rates about 20 to  Cardiac: Regular rate rhythm now sinus on the monitor in the mid 80s  Abdomen: Soft no palpable hepatosplenomegaly  : Not examined  Musculoskeletal: She has at least moderate thoracic kyphosis may be moderately severe  Skin: Warm and dry no jaundice no petechiae now again when I first  saw her she was just alexis white  Neuro: She is following some simple commands now and answering simple questions she was unresponsive for about 10 minutes  Extremities/P Vascular: She has palpable radial and dorsalis pedis pulses now when I first arrived her rate was in the 200s I really could not palpate distal pulses just a carotid pulse.  MSE: She is not really animated she is still pretty lethargic is hard to tell much.       Labs:  Results from last 7 days   Lab Units 07/15/23  0402 07/14/23  0825 07/13/23  1537 07/13/23  0450 07/12/23  1451 07/12/23  0424 07/11/23  1236   GLUCOSE mg/dL 114* 107* 78 101*  --  99 108*   SODIUM mmol/L 137 136 139 139  --  144 142   POTASSIUM mmol/L 4.0 4.1 4.3 5.0  --  4.0 4.1   MAGNESIUM mg/dL 2.2 2.2  --  2.2  --   --   --    CO2 mmol/L 27.0 27.0 25.5 30.0*  --  29.1* 28.7   CHLORIDE mmol/L 100 97* 99 98  --  102 103   ANION GAP mmol/L 10.0 12.0 14.5 11.0  --  12.9 10.3   CREATININE mg/dL 2.65* 3.35* 3.19* 2.86*  --  1.76* 1.86*   BUN mg/dL 45* 49* 47* 46*  --  31* 28*   BUN / CREAT RATIO  17.0 14.6 14.7 16.1  --  17.6 15.1   CALCIUM mg/dL 8.4* 8.6 8.3* 8.8  --  9.2 9.2   ALK PHOS U/L  --  125*  --  131* 135*  --  136*   TOTAL PROTEIN g/dL  --  6.2  --  6.5 6.8  --  7.2   ALT (SGPT) U/L  --  10  --  13 11  --  12   AST (SGOT) U/L  --  25  --  28 24  --  24   BILIRUBIN mg/dL  --  0.3  --  0.3 0.3  --  0.3   ALBUMIN g/dL 3.3* 3.4* 3.6 3.8 3.9  --  4.2   GLOBULIN gm/dL  --  2.8  --  2.7  --   --  3.0     Estimated Creatinine Clearance: 11.5 mL/min (A) (by C-G formula based on SCr of 2.65 mg/dL (H)).      Results from last 7 days   Lab Units 07/15/23  0402 07/14/23  0824 07/13/23  0450 07/12/23  0424 07/11/23  1236   WBC 10*3/mm3 7.74 8.65 7.33 6.01 7.04   RBC 10*6/mm3 4.01 4.15 4.13 4.16 4.17   HEMOGLOBIN g/dL 9.9* 10.4* 10.3* 10.4* 10.4*   HEMATOCRIT % 32.3* 33.7* 34.2 33.6* 34.3   MCV fL 80.5 81.2 82.8 80.8 82.3   MCH pg 24.7* 25.1* 24.9* 25.0* 24.9*   MCHC g/dL 30.7* 30.9*  30.1* 31.0* 30.3*   RDW % 15.5* 15.5* 15.5* 15.8* 15.8*   RDW-SD fl 44.9 45.3 46.5 46.5 46.9   MPV fL 8.8 8.4 8.7 8.8 8.5   PLATELETS 10*3/mm3 217 193 214 187 204   NEUTROPHIL % % 64.3 65.3  --   --  55.9   LYMPHOCYTE % % 16.8* 19.9  --   --  30.8   MONOCYTES % % 14.6* 12.8*  --   --  9.7   EOSINOPHIL % % 3.5 1.5  --   --  3.1   BASOPHIL % % 0.4 0.3  --   --  0.4   IMM GRAN % %  --  0.2  --   --  0.1   NEUTROS ABS 10*3/mm3 4.98 5.64  --   --  3.93   LYMPHS ABS 10*3/mm3 1.30 1.72  --   --  2.17   MONOS ABS 10*3/mm3 1.13* 1.11*  --   --  0.68   EOS ABS 10*3/mm3 0.27 0.13  --   --  0.22   BASOS ABS 10*3/mm3 0.03 0.03  --   --  0.03   IMMATURE GRANS (ABS) 10*3/mm3  --  0.02  --   --  0.01   NRBC /100 WBC  --  0.0  --   --  0.0         Results from last 7 days   Lab Units 07/12/23  0424 07/12/23  0012 07/11/23  2205   HSTROP T ng/L 44* 43* 35*     Results from last 7 days   Lab Units 07/11/23  1236   PROBNP pg/mL 3,635.0*         Results from last 7 days   Lab Units 07/13/23  1819 07/11/23  1236   LACTATE mmol/L 1.1  --    PROCALCITONIN ng/mL  --  0.10     Results from last 7 days   Lab Units 07/11/23  1236   INR  0.93     Microbiology Results (last 10 days)       Procedure Component Value - Date/Time    Respiratory Panel PCR w/COVID-19(SARS-CoV-2) RICARDA/JACK/KRUNAL/PAD/COR/MAD/CANDICE In-House, NP Swab in [facility], 3-4 HR TAT - Swab, Nasopharynx [[id number]]  (Normal) Collected: 07/11/23 1239    Lab Status: Final result Specimen: Swab from Nasopharynx Updated: 07/11/23 1357     ADENOVIRUS, PCR Not Detected     Coronavirus 229E Not Detected     Coronavirus HKU1 Not Detected     Coronavirus NL63 Not Detected     Coronavirus OC43 Not Detected     COVID19 Not Detected     Human Metapneumovirus Not Detected     Human Rhinovirus/Enterovirus Not Detected     Influenza A PCR Not Detected     Influenza B PCR Not Detected     Parainfluenza Virus 1 Not Detected     Parainfluenza Virus 2 Not Detected     Parainfluenza Virus 3 Not Detected      Parainfluenza Virus 4 Not Detected     RSV, PCR Not Detected     Bordetella pertussis pcr Not Detected     Bordetella parapertussis PCR Not Detected     Chlamydophila pneumoniae PCR Not Detected     Mycoplasma pneumo by PCR Not Detected    Narrative:      In the setting of a positive respiratory panel with a viral infection PLUS a negative procalcitonin without other underlying concern for bacterial infection, consider observing off antibiotics or discontinuation of antibiotics and continue supportive care. If the respiratory panel is positive for atypical bacterial infection (Bordetella pertussis, Chlamydophila pneumoniae, or Mycoplasma pneumoniae), consider antibiotic de-escalation to target atypical bacterial infection.                aspirin, 81 mg, Oral, Daily  bumetanide, 4 mg, Intravenous, Q8H  insulin lispro, 2-9 Units, Subcutaneous, 4x Daily AC & at Bedtime  pantoprazole, 40 mg, Oral, Daily  prasugrel, 5 mg, Oral, Daily  rosuvastatin, 10 mg, Oral, Daily  senna-docusate sodium, 2 tablet, Oral, BID      DOBUTamine, 5 mcg/kg/min, Last Rate: 5 mcg/kg/min (07/15/23 0509)  norepinephrine, 0.02-0.3 mcg/kg/min, Last Rate: 0.1 mcg/kg/min (07/15/23 0510)        Diagnostics:  Adult Transthoracic Echo Complete W/ Cont if Necessary Per Protocol    Result Date: 7/14/2023    Left ventricular systolic function is moderately decreased. Left ventricular ejection fraction appears to be 36 - 40%.   The findings are consistent with dilated cardiomyopathy.   Left ventricular diastolic function was indeterminate.   Estimated right ventricular systolic pressure from tricuspid regurgitation is normal (<35 mmHg).     CT Cervical Spine Without Contrast    Result Date: 7/12/2023  CT CERVICAL SPINE WITHOUT CONTRAST  HISTORY: Fall, neck pain.  COMPARISON: CT cervical spine 04/05/2018  FINDINGS: There is a grade 1 anterolisthesis of C4 upon C5 estimated to be approximately 2 mm. There is moderate loss of disc height at C4-5 and C5-6.  There is no evidence of prevertebral edema.  C2-3: A small central disc osteophyte complex is present with no evidence of herniation. Moderate facet degenerative disease is present on the right.  C3-4: Mild facet degenerative disease is present bilaterally.  C4-5: Moderate to severe facet degenerative disease is present on the left.  C5-6: A mild central disc osteophyte complex is present with no evidence of herniation. Moderate to severe foraminal stenosis is present on the right secondary to uncovertebral degenerative disease.  C6-7: There is no evidence of disc bulge or herniation.  C7-T1: There is no evidence of disc bulge or herniation.      Multilevel degenerative disease involving the cervical spine is noted as described above with no evidence of fracture. Further evaluation could be performed with a MRI examination of the cervical spine as indicated.    Radiation dose reduction techniques were utilized, including automated exposure control and exposure modulation based on body size.  This report was finalized on 7/12/2023 2:39 PM by Dr. Abram Ayala M.D.      XR Chest 1 View    Result Date: 7/13/2023  XR CHEST 1 VW-  HISTORY: Female who is 88 years-old,  respiratory failure  TECHNIQUE: Frontal view of the chest  COMPARISON: 07/11/2023  FINDINGS: The patient is rotated towards the right. The heart appears enlarged. Aorta is tortuous, calcified. Pulmonary vasculature is unremarkable. No focal pulmonary consolidation, pleural effusion, or pneumothorax. Chronic left humerus deformity is noted. No acute osseous process.      No focal pulmonary consolidation. Cardiomegaly. Tortuous aorta.  This report was finalized on 7/13/2023 5:47 PM by Dr. Amilcar Marquez M.D.      XR Chest 1 View    Result Date: 7/11/2023  XR CHEST 1 VW-  HISTORY: Female who is 88 years-old,  short of breath  TECHNIQUE: Frontal views of the chest  COMPARISON: None available  FINDINGS: The heart is enlarged. Aorta is tortuous. Mild  prominence of interstitial markings. Minimal likely atelectasis at the bases. No pleural effusion, or pneumothorax. No acute osseous process.      Minimal likely atelectasis at the bases. Cardiomegaly with mild prominence of interstitial markings. Tortuous aorta.  This report was finalized on 7/11/2023 12:18 PM by Dr. Amilcar Marquez M.D.      US Abdomen Limited    Result Date: 7/13/2023  Examination: Renal sonogram  TECHNIQUE: Sonographic images of the kidneys and urinary bladder were obtained  HISTORY:Nausea  COMPARISON: 03/25/2016  FINDINGS: The pancreatic head and body are normal, with mildly dilated, 3 mm pancreatic duct. The visualized portions of the proximal IVC and aorta are normal. There is coarsened hepatic echotexture, without mass seen in visualized portions. The portal vein is patent, with antegrade flow. The common duct is dilated, measuring 1.2 cm, previously 0.4 cm. The right kidney is echogenic, without hydronephrosis, measuring approximately 6.5 cm with a simple appearing upper pole cyst measuring 4.5 cm. Additional simple appearing right renal cysts are seen.      1. Interval common duct dilation. Correlate with history and laboratory findings and ERCP or MRCP if clinically indicated 2. Prominent pancreatic duct. Correlate with the above 3. Coarsened hepatic echotexture, compatible with hepatocellular disease, without mass seen in visualized portions 4. Echogenic kidneys, compatible with renal parenchymal disease, with simple appearing cysts, not requiring follow-up.  This report was finalized on 7/13/2023 12:50 PM by Dr. Francisco Bhatt M.D.      Results for orders placed during the hospital encounter of 07/11/23    Adult Transthoracic Echo Complete W/ Cont if Necessary Per Protocol    Interpretation Summary    Left ventricular systolic function is moderately decreased. Left ventricular ejection fraction appears to be 36 - 40%.    The findings are consistent with dilated cardiomyopathy.    Left  ventricular diastolic function was indeterminate.    Estimated right ventricular systolic pressure from tricuspid regurgitation is normal (<35 mmHg).          Active Hospital Problems    Diagnosis  POA    DDD (degenerative disc disease), cervical [M50.30]  Yes    Cervical pain (neck) [M54.2]  Yes    CHF (congestive heart failure) [I50.9]  Yes      Resolved Hospital Problems    Diagnosis Date Resolved POA    Closed left hip fracture [S72.002A] 07/11/2023 Yes         Assessment & Plan     Acute hypoxic respiratory failure she has been weaned to 6 L nasal cannula O2 with her episode of SVT he have taken her up to 15 L looks like there is room to wean again.  I am going to check a chest x-ray  Acute on chronic heart failure reduced ejection fraction uresis per nephrology and cardiology  Cardiogenic shock patient was on dobutamine apparently when this SVT initiated we held that nurses on the phone with cardiology discussing treatment options currently.  Cardiac arrhythmias she has had multiple different rhythms in the last 10 or 15 minutes NSVT in the 200s, bradycardia without a definite P probably a junctional bradycardia in the 40s, followed by what looks like A-fib in the 130s and now back into sinus rhythm.  Nursing and is talking with cardiology regarding treatment options here  Acute kidney injury on chronic kidney disease stage IV nephrology following renal function improved from yesterday  Coronary artery disease  Ischemic cardiomyopathy LVEF 36 to 40%  Altered mental status question did she have a seizure or was she just hypoperfused or both with her arrhythmia.  She was out of it for a few minutes after we got a better rhythm and pulse/blood pressure but seems to be awake and responding appropriately now I do not know that was a little postictal period or just recovery I am not inclined to start any the epileptic drugs unless I see further evidence for seizure activity.  Protein calorie malnutrition  Anemia her  iron saturation was a little low but total iron was normal probably predominantly anemia of chronic disease there may be some mild iron deficiency component   severe thoracic kyphoscoliosis  Common bile duct dilatation, history of pancreatic stent and some coarsening of echogenic texture of the liver  DNR/DNI    Plan for disposition:    Derek Olivier Jr, MD  07/15/23  07:19 EDT    Time: Critical care time 49 minutes

## 2023-07-15 NOTE — SIGNIFICANT NOTE
07/15/23 1117   OTHER   Discipline physical therapist   Rehab Time/Intention   Session Not Performed unable to treat, medical status change  (pt tsf to CCU, spoke with SUMIT Camacho who advised PT to sign off, request reconsult if needed)

## 2023-07-16 NOTE — PLAN OF CARE
Goal Outcome Evaluation:   AME, A&Ox4, intermittent confusion. Pt's BP still requiring pressor to stay within parameters. Patient struggled to sleep, required PRN sleep med. Pt had an episode of restless/ agitation & mild aggression towards staff. Concerned about peeing in bed, when reminded several times of having an external catheter. Pt c/o burning when urinating. Pt still requiring 15L high flow NC to maintain o2 >90%, tried to titrate down/ patient tolerated less then had episode of agitation & dropped to 70's.  Pt's son, Kirk/ [facility] was due to arrive overnight but flight got canceled. Grand daughter stated Kirk to arrive in AM.

## 2023-07-16 NOTE — PROGRESS NOTES
Marilynn Gil   88 y.o.  female    LOS: 4 days   Patient Care Team:  Pam Charles APRN as PCP - General (Internal Medicine)  Joycelyn Zapata MD (Inactive) as PCP - Family Medicine      Subjective     Interval History:     Patient Complaints:  Episode of narrow complex tachycardia with a respiratory depression spontaneously resolved prior to adenosine administration sats improved patient with a near syncopal episode at that time        Medication Review:   Current Facility-Administered Medications:     acetaminophen (TYLENOL) tablet 650 mg, 650 mg, Oral, Q4H PRN, Lacey Garibay MD, 650 mg at 07/11/23 2254    albuterol (PROVENTIL) nebulizer solution 0.083% 2.5 mg/3mL, 2.5 mg, Nebulization, Q6H PRN, Lacey Garibay MD    amiodarone (PACERONE) tablet 200 mg, 200 mg, Oral, BID With Meals, Tien Tello MD, 200 mg at 07/15/23 1536    aspirin chewable tablet 81 mg, 81 mg, Oral, Daily, Lacey Garibay MD, 81 mg at 07/14/23 0825    sennosides-docusate (PERICOLACE) 8.6-50 MG per tablet 2 tablet, 2 tablet, Oral, BID, 2 tablet at 07/13/23 2201 **AND** polyethylene glycol (MIRALAX) packet 17 g, 17 g, Oral, Daily PRN **AND** bisacodyl (DULCOLAX) EC tablet 5 mg, 5 mg, Oral, Daily PRN **AND** bisacodyl (DULCOLAX) suppository 10 mg, 10 mg, Rectal, Daily PRN, Lacey Garibay MD    dextrose (D50W) (25 g/50 mL) IV injection 25 g, 25 g, Intravenous, Q15 Min PRN, Lacey Garibay MD    dextrose (GLUTOSE) oral gel 15 g, 15 g, Oral, Q15 Min PRN, Lacey Garibay MD    diphenhydrAMINE (BENADRYL) injection 25 mg, 25 mg, Intravenous, Q6H PRN, Derek Olivier Jr., MD, 25 mg at 07/15/23 2109    DOBUTamine (DOBUTREX) 1 mg/mL infusion, 5 mcg/kg/min, Intravenous, Titrated, Francisco Romero APRN, Stopped at 07/15/23 0840    fentaNYL citrate (PF) (SUBLIMAZE) injection 12.5 mcg, 12.5 mcg, Intravenous, Q4H PRN, Teodoro Flynn MD, 12.5 mcg at 07/15/23 0414    glucagon (GLUCAGEN) injection  1 mg, 1 mg, Intramuscular, Q15 Min PRN, Lacey Garibay MD    HYDROcodone-acetaminophen (NORCO) 5-325 MG per tablet 1 tablet, 1 tablet, Oral, Q6H PRN, Lacey Garibay MD, 1 tablet at 07/15/23 0021    HYDROmorphone (DILAUDID) injection 0.5 mg, 0.5 mg, Intravenous, Q2H PRN, Jose Mejia MD, 0.5 mg at 07/14/23 2332    melatonin tablet 3 mg, 3 mg, Oral, Nightly PRN, Lacey Garibay MD, 3 mg at 07/11/23 2253    norepinephrine (LEVOPHED) 8 mg in 250 mL NS infusion (premix), 0.02-0.3 mcg/kg/min, Intravenous, Titrated, Francisco Romero, APRN, Last Rate: 9.7 mL/hr at 07/15/23 1242, 0.12 mcg/kg/min at 07/15/23 1242    ondansetron (ZOFRAN) tablet 4 mg, 4 mg, Oral, Q4H PRN **OR** ondansetron (ZOFRAN) injection 4 mg, 4 mg, Intravenous, Q4H PRN, Teodoro Flynn MD, 4 mg at 07/15/23 2120    pantoprazole (PROTONIX) EC tablet 40 mg, 40 mg, Oral, Daily, Lacey Garibay MD, 40 mg at 07/14/23 0825    prasugrel (EFFIENT) tablet 5 mg, 5 mg, Oral, Daily, Lacey Garibay MD, 5 mg at 07/14/23 0825    promethazine (PHENERGAN) tablet 12.5 mg, 12.5 mg, Oral, Q6H PRN, 12.5 mg at 07/12/23 1050 **OR** promethazine (PHENERGAN) suppository 12.5 mg, 12.5 mg, Rectal, Q6H PRN, Jsoe Mejia MD    rosuvastatin (CRESTOR) tablet 10 mg, 10 mg, Oral, Daily, Lacey Garibay MD, 10 mg at 07/14/23 0825    [COMPLETED] Insert Peripheral IV, , , Once **AND** sodium chloride 0.9 % flush 10 mL, 10 mL, Intravenous, PRN, Carlton España MD    zolpidem (AMBIEN) tablet 5 mg, 5 mg, Oral, Nightly PRN, Jose Mejia MD, 5 mg at 07/14/23 2230      Objective   Vital Sign Min/Max for last 24 hours  Temp  Min: 98 °F (36.7 °C)  Max: 98.2 °F (36.8 °C)   BP  Min: 84/67  Max: 150/70    Pulse  Min: 68  Max: 115     Wt Readings from Last 3 Encounters:   07/15/23 47.8 kg (105 lb 6.1 oz)   07/11/23 47.2 kg (104 lb)   06/23/23 45.9 kg (101 lb 3.2 oz)        Intake/Output Summary (Last 24 hours) at 7/15/2023  2143  Last data filed at 7/15/2023 1759  Gross per 24 hour   Intake --   Output 800 ml   Net -800 ml       Physical Exam:      General Appearance:    Well developed and well nourished elderly female with soa at rest on o2   Head:    Normocephalic, atraumatic   Eyes:            Conjunctivae normal, anicteric, no xanthelasma   Neck:   supple, trachea midline, no thyromegaly, no carotid bruit, +JVD, + elevated CVP   Lungs:     Clear to auscultation ant with accessory muscle use    Heart:    Regular rhythm and normal rate, normal S1 and S2,            No murmur, no gallop, no rub, no click   Chest Wall:    No abnormalities observed   Abdomen:     Normal bowel sounds, no masses, no organomegaly, soft        nontender, nondistended, no guarding, no rebound                tenderness   Rectal:     Deferred   Extremities:   No edema. Moves all extremities well, no cyanosis, no erythema   Pulses:   Pulses palpable and equal bilaterally   Skin:   No bleeding, bruising or rash   Neurologic:   awake alert and oriented x3, speech clear and approp, no facial drooping     : voids   Monitor:  sinus tach    Results Review:         Sodium Sodium   Date Value Ref Range Status   07/15/2023 137 136 - 145 mmol/L Final   07/14/2023 136 136 - 145 mmol/L Final   07/13/2023 139 136 - 145 mmol/L Final   07/13/2023 139 136 - 145 mmol/L Final      Potassium Potassium   Date Value Ref Range Status   07/15/2023 4.0 3.5 - 5.2 mmol/L Final   07/14/2023 4.1 3.5 - 5.2 mmol/L Final   07/13/2023 4.3 3.5 - 5.2 mmol/L Final     Comment:     Slight hemolysis detected by analyzer. Results may be affected.   07/13/2023 5.0 3.5 - 5.2 mmol/L Final     Comment:     Slight hemolysis detected by analyzer. Results may be affected.      Chloride Chloride   Date Value Ref Range Status   07/15/2023 100 98 - 107 mmol/L Final   07/14/2023 97 (L) 98 - 107 mmol/L Final   07/13/2023 99 98 - 107 mmol/L Final   07/13/2023 98 98 - 107 mmol/L Final      Bicarbonate No  results found for: PLASMABICARB   BUN BUN   Date Value Ref Range Status   07/15/2023 45 (H) 8 - 23 mg/dL Final   07/14/2023 49 (H) 8 - 23 mg/dL Final   07/13/2023 47 (H) 8 - 23 mg/dL Final   07/13/2023 46 (H) 8 - 23 mg/dL Final      Creatinine Creatinine   Date Value Ref Range Status   07/15/2023 2.65 (H) 0.57 - 1.00 mg/dL Final   07/14/2023 3.35 (H) 0.57 - 1.00 mg/dL Final   07/13/2023 3.19 (H) 0.57 - 1.00 mg/dL Final   07/13/2023 2.86 (H) 0.57 - 1.00 mg/dL Final      Calcium Calcium   Date Value Ref Range Status   07/15/2023 8.4 (L) 8.6 - 10.5 mg/dL Final   07/14/2023 8.6 8.6 - 10.5 mg/dL Final   07/13/2023 8.3 (L) 8.6 - 10.5 mg/dL Final   07/13/2023 8.8 8.6 - 10.5 mg/dL Final      Magnesium Magnesium   Date Value Ref Range Status   07/15/2023 2.2 1.6 - 2.4 mg/dL Final   07/14/2023 2.2 1.6 - 2.4 mg/dL Final   07/13/2023 2.2 1.6 - 2.4 mg/dL Final        Results from last 7 days   Lab Units 07/15/23  0402   WBC 10*3/mm3 7.74   HEMOGLOBIN g/dL 9.9*   HEMATOCRIT % 32.3*   PLATELETS 10*3/mm3 217       Lab Results   Lab Value Date/Time    TROPONINT 44 (H) 07/12/2023 0424    TROPONINT 43 (H) 07/12/2023 0012    TROPONINT 35 (H) 07/11/2023 2205    TROPONINT 40 (H) 07/11/2023 1236            Echo EF Estimated  No results found for: ECHOEFEST      Assessment/ Plan  Assessment & Plan   Active Hospital Problems    Diagnosis  POA    DDD (degenerative disc disease), cervical [M50.30]  Yes    Cervical pain (neck) [M54.2]  Yes    CHF (congestive heart failure) [I50.9]  Yes       1. ICM  2. Acute on chronic HFrEF  Stage D class IV    A. 2009 and a catheterization. Ejection fraction was 40%. There was no discrete clot in her apex, which had been noted years ago. There was an 80 percent circumflex stenosis, which was stented. She had a previous LAD PTCA, which was patent.    B. Per office note- refuses AICD  3. CAD  4. Hypoxia  5. Falls   6. Neck and shoulder pain  7. HLD  8. DDD  9. Chronic pancreatitis   10.  Acute kidney injury on  chronic kidney disease most likely related to cardiorenal syndrome   Renal foll  Creat 3.19     PLAN  Now hypotensive with difficulty diuresing on levophed and Dobutrex, sys bp 115 on gtts and await renal today for diuresis  Discussed AICD with pt and family, they will consider    MRI C spine pending    Interval plan with narrow complex tachycardia we will add amiodarone 200 mg twice daily discussed the plan with family/granddaughter and patient continue to monitor for further arrhythmias      Leighton Beasley MD  07/15/23  21:43 EDT    Pt seen and examined

## 2023-07-16 NOTE — PROGRESS NOTES
[facility] Progress Note      Patient Name: Marilynn Gil  : 1935  MRN: [id number]  Primary Care Physician:  Pam Charles APRN  Date of admission: 2023    Subjective     Interval History:   F/u NYLA    Review of Systems:   I/O 330/1100   On 15L NC O2  Denies dyspnea  On levophed,    Bigeminy overnight per RN    Objective     Vitals:   Temp:  [98 °F (36.7 °C)-98.7 °F (37.1 °C)] 98.7 °F (37.1 °C)  Heart Rate:  [67-94] 77  Resp:  [16-18] 17  BP: ()/(52-89) 131/70  Flow (L/min):  [6-15] 15    Intake/Output Summary (Last 24 hours) at 2023 0939  Last data filed at 2023 0400  Gross per 24 hour   Intake 331 ml   Output 1100 ml   Net -769 ml       Physical Exam:    General Appearance: frail WF on NC O2  Neck: supple, no JVD  Lungs: Coarse BS  Heart: RRR, normal S1 and S2  Abdomen: soft, nontender, nondistended  : pure wick in place   Extremities: no edema, cyanosis or clubbing    Scheduled Meds:     amiodarone, 200 mg, Oral, BID With Meals  aspirin, 81 mg, Oral, Daily  pantoprazole, 40 mg, Oral, Daily  prasugrel, 5 mg, Oral, Daily  rosuvastatin, 10 mg, Oral, Daily  senna-docusate sodium, 2 tablet, Oral, BID      IV Meds:   DOBUTamine, 5 mcg/kg/min, Last Rate: Stopped (07/15/23 0840)  norepinephrine, 0.02-0.3 mcg/kg/min, Last Rate: 0.09 mcg/kg/min (23 0620)        Results Reviewed:   I have personally reviewed the results from the time of this admission to 2023 09:39 EDT     Results from last 7 days   Lab Units 23  0425 07/15/23  0402 23  0825 23  1537 23  0450   SODIUM mmol/L 142 137 136   < > 139   POTASSIUM mmol/L 3.9 4.0 4.1   < > 5.0   CHLORIDE mmol/L 100 100 97*   < > 98   CO2 mmol/L 31.5* 27.0 27.0   < > 30.0*   BUN mg/dL 39* 45* 49*   < > 46*   CREATININE mg/dL 2.32* 2.65* 3.35*   < > 2.86*   CALCIUM mg/dL 8.9 8.4* 8.6   < > 8.8   BILIRUBIN mg/dL 0.3  --  0.3  --  0.3   ALK PHOS U/L 113  --  125*  --  131*    ALT (SGPT) U/L 13  --  10  --  13   AST (SGOT) U/L 17  --  25  --  28   GLUCOSE mg/dL 108* 114* 107*   < > 101*    < > = values in this interval not displayed.     Estimated Creatinine Clearance: 13.2 mL/min (A) (by C-G formula based on SCr of 2.32 mg/dL (H)).  Results from last 7 days   Lab Units 07/15/23  0402 07/14/23  0825 07/13/23  1537 07/13/23  0450   MAGNESIUM mg/dL 2.2 2.2  --  2.2   PHOSPHORUS mg/dL 3.7 4.7* 5.6*  --      Results from last 7 days   Lab Units 07/15/23  0402 07/14/23  0825 07/13/23  0450   URIC ACID mg/dL 9.5* 9.9* 9.4*     Results from last 7 days   Lab Units 07/16/23  0425 07/15/23  0402 07/14/23  0824 07/13/23  0450 07/12/23  0424   WBC 10*3/mm3 8.88 7.74 8.65 7.33 6.01   HEMOGLOBIN g/dL 10.5* 9.9* 10.4* 10.3* 10.4*   PLATELETS 10*3/mm3 226 217 193 214 187     Results from last 7 days   Lab Units 07/11/23  1236   INR  0.93       Assessment / Plan     ASSESSMENT:  Non olig NYLA, CKD stage 4 (BL Cr 1.8 to 2) - Cr improving,  3.3 to 2.6 to 2.3.  K 3.9.  Vol excess improved , held diuretic yesterday due to SVT issues.  Inc O2 requirement noted.  CXR yesterday w/o central rachel.  UOP decent ~ 1L/24h  Cardiogenic shock currently on levophed, off dobutamine due to SVT   CAD + ischemic cardiomyopathy, EF 35 to 40% on echo  Acute hypoxic resp failure, up to 15L NC O2  SVT - CARD added amio  Acute encephalopathy - ? Seizure related to SVT episode ; mentation better currently   Degenerative disc disease  Anemia, hgb stable 10.5  DNR/DNI status    PLAN:  Bumex 2mg IV x1  KCL 20meq Po x1  Prognosis remains guarded - palliative following, to revisit goals of care tomorrow  D/w SUMIT Umana MD  07/16/23  09:39 EDT    [facility]  [phone]

## 2023-07-16 NOTE — PROGRESS NOTES
LOS: 5 days   Patient Care Team:  Pam Charles APRN as PCP - General (Internal Medicine)  Joycelyn Zapata MD (Inactive) as PCP - Family Medicine    Subjective       Patient was transferred to the ICU with concerns for cardiogenic shock with acute kidney injury on chronic kidney disease and shortness of breath she has a history of coronary disease stage IV chronic kidney disease, prior CVA, DVTs, diabetes, pancreatic duct stent and hepatobiliary abnormalities who presented to hospital with neck and shoulder pain and shortness of breath was found to be in respiratory failure and CHF and hypotensive after diuresis.  I was called stat to the patient's bedside she had developed some SVT rate in the 200s for the first minute or 2 apparently she was asymptomatic and then she had what appeared to be a seizure like activity to nursing on my arrival she was unresponsive was not really breathing ashen white heart rate was in the 200s looked regular but hard to tell and that rate.  He is a DNR/DNI I went ahead and bag mask ventilated her she started dropping her heart rate into the 40s maintained a pulse.  After several minutes of bag mask ventilation she started breathing on her own and after a few more minutes she has awakened and she is conversant and able to follow simple commands.  Placed an oral airway while we were bagging her and she woke up and asked if she wanted it removed she nodded her head yes we took it out she said thank you that was choking me.  She is denying any chest pain currently.  Her heart rate came back up into the 130s and what look like A-fib irregularly irregular with no definite P waves then she dropped back into the 80s with what looked to be a sinus rhythm with some occasional PVCs.    Patient is awake fully alert and oriented today she is not having any pain on 15 L O2 she is not short of breath.  She informed me that she is done apparently her son is in route from [place] she  says once he gets here she is going to die she is done fighting.  She has talked with palliative care several days ago and she says once he gets here she wants to go that route.      Review of Systems:          Objective     Vital Signs  Vital Sign Min/Max for last 24 hours  Temp  Min: 98 °F (36.7 °C)  Max: 98.7 °F (37.1 °C)   BP  Min: 84/67  Max: 146/69   Pulse  Min: 67  Max: 115   Resp  Min: 16  Max: 18   SpO2  Min: 86 %  Max: 100 %   Flow (L/min)  Min: 6  Max: 15   Weight  Min: 47.9 kg (105 lb 9.6 oz)  Max: 47.9 kg (105 lb 9.6 oz)        Ventilator/Non-Invasive Ventilation Settings (From admission, onward)      None                         Body mass index is 22.07 kg/m².  I/O last 3 completed shifts:  In: 458 [P.O.:60; I.V.:398]  Out: 1200 [Urine:1200]  I/O this shift:  In: 331 [P.O.:240; I.V.:91]  Out: 300 [Urine:300]        Physical Exam:  General Appearance: Thin elderly white female who looks chronically ill she looks immensely better than she did yesterday she is still on norepinephrine drip at 0.06 mics per kilogram per minute she is on 15 L high flow nasal cannula with sats of 100% I dropped her down to 12 L she dropped about 97%.  Eyes: Conjunctiva are clear and anicteric pupils about 3 mm  ENT: Oral mucous membranes are little dry no erythema no exudates, nasal septum midline  Neck: Trachea midline lying at about 45 degrees she does have jugular venous distention  Lungs: Coarse rales in the bases bilaterally to about mid lung  Cardiac: Regular rate rhythm now sinus on the monitor in the mid 80s  Abdomen: Soft no palpable hepatosplenomegaly  : Not examined  Musculoskeletal: She has at least moderate thoracic kyphosis may be moderately severe  Skin: Warm and dry no jaundice no petechiae   Neuro: She is  awake alert conversant but she was informing me and her family of her plans once her other son arrives.  Extremities/P Vascular: No clubbing no cyanosis no edema palpable radial and dorsalis pedis  pulses  MSE: Very pleasant lady       Labs:  Results from last 7 days   Lab Units 07/16/23  0425 07/15/23  0402 07/14/23  0825 07/13/23  1537 07/13/23  0450 07/12/23  1451 07/12/23  0424 07/11/23  1236   GLUCOSE mg/dL 108* 114* 107* 78 101*  --  99 108*   SODIUM mmol/L 142 137 136 139 139  --  144 142   POTASSIUM mmol/L 3.9 4.0 4.1 4.3 5.0  --  4.0 4.1   MAGNESIUM mg/dL  --  2.2 2.2  --  2.2  --   --   --    CO2 mmol/L 31.5* 27.0 27.0 25.5 30.0*  --  29.1* 28.7   CHLORIDE mmol/L 100 100 97* 99 98  --  102 103   ANION GAP mmol/L 10.5 10.0 12.0 14.5 11.0  --  12.9 10.3   CREATININE mg/dL 2.32* 2.65* 3.35* 3.19* 2.86*  --  1.76* 1.86*   BUN mg/dL 39* 45* 49* 47* 46*  --  31* 28*   BUN / CREAT RATIO  16.8 17.0 14.6 14.7 16.1  --  17.6 15.1   CALCIUM mg/dL 8.9 8.4* 8.6 8.3* 8.8  --  9.2 9.2   ALK PHOS U/L 113  --  125*  --  131* 135*  --  136*   TOTAL PROTEIN g/dL 6.3  --  6.2  --  6.5 6.8  --  7.2   ALT (SGPT) U/L 13  --  10  --  13 11  --  12   AST (SGOT) U/L 17  --  25  --  28 24  --  24   BILIRUBIN mg/dL 0.3  --  0.3  --  0.3 0.3  --  0.3   ALBUMIN g/dL 3.5 3.3* 3.4* 3.6 3.8 3.9  --  4.2   GLOBULIN gm/dL 2.8  --  2.8  --  2.7  --   --  3.0     Estimated Creatinine Clearance: 13.2 mL/min (A) (by C-G formula based on SCr of 2.32 mg/dL (H)).      Results from last 7 days   Lab Units 07/16/23  0425 07/15/23  0402 07/14/23  0824 07/13/23  0450 07/12/23  0424 07/11/23  1236   WBC 10*3/mm3 8.88 7.74 8.65 7.33 6.01 7.04   RBC 10*6/mm3 4.20 4.01 4.15 4.13 4.16 4.17   HEMOGLOBIN g/dL 10.5* 9.9* 10.4* 10.3* 10.4* 10.4*   HEMATOCRIT % 33.4* 32.3* 33.7* 34.2 33.6* 34.3   MCV fL 79.5 80.5 81.2 82.8 80.8 82.3   MCH pg 25.0* 24.7* 25.1* 24.9* 25.0* 24.9*   MCHC g/dL 31.4* 30.7* 30.9* 30.1* 31.0* 30.3*   RDW % 15.4 15.5* 15.5* 15.5* 15.8* 15.8*   RDW-SD fl 44.4 44.9 45.3 46.5 46.5 46.9   MPV fL 8.5 8.8 8.4 8.7 8.8 8.5   PLATELETS 10*3/mm3 226 217 193 214 187 204   NEUTROPHIL % % 60.2 64.3 65.3  --   --  55.9   LYMPHOCYTE % % 19.4*  16.8* 19.9  --   --  30.8   MONOCYTES % % 15.0* 14.6* 12.8*  --   --  9.7   EOSINOPHIL % % 4.7 3.5 1.5  --   --  3.1   BASOPHIL % % 0.5 0.4 0.3  --   --  0.4   IMM GRAN % % 0.2  --  0.2  --   --  0.1   NEUTROS ABS 10*3/mm3 5.35 4.98 5.64  --   --  3.93   LYMPHS ABS 10*3/mm3 1.72 1.30 1.72  --   --  2.17   MONOS ABS 10*3/mm3 1.33* 1.13* 1.11*  --   --  0.68   EOS ABS 10*3/mm3 0.42* 0.27 0.13  --   --  0.22   BASOS ABS 10*3/mm3 0.04 0.03 0.03  --   --  0.03   IMMATURE GRANS (ABS) 10*3/mm3 0.02  --  0.02  --   --  0.01   NRBC /100 WBC 0.0  --  0.0  --   --  0.0         Results from last 7 days   Lab Units 07/12/23  0424 07/12/23  0012 07/11/23  2205   HSTROP T ng/L 44* 43* 35*     Results from last 7 days   Lab Units 07/11/23  1236   PROBNP pg/mL 3,635.0*         Results from last 7 days   Lab Units 07/13/23  1819 07/11/23  1236   LACTATE mmol/L 1.1  --    PROCALCITONIN ng/mL  --  0.10     Results from last 7 days   Lab Units 07/11/23  1236   INR  0.93     Microbiology Results (last 10 days)       Procedure Component Value - Date/Time    Respiratory Panel PCR w/COVID-19(SARS-CoV-2) RICARDA/JACK/KRUNAL/PAD/COR/MAD/CANDICE In-House, NP Swab in [facility]/VTM, 3-4 HR TAT - Swab, Nasopharynx [[id number]]  (Normal) Collected: 07/11/23 1239    Lab Status: Final result Specimen: Swab from Nasopharynx Updated: 07/11/23 2984     ADENOVIRUS, PCR Not Detected     Coronavirus 229E Not Detected     Coronavirus HKU1 Not Detected     Coronavirus NL63 Not Detected     Coronavirus OC43 Not Detected     COVID19 Not Detected     Human Metapneumovirus Not Detected     Human Rhinovirus/Enterovirus Not Detected     Influenza A PCR Not Detected     Influenza B PCR Not Detected     Parainfluenza Virus 1 Not Detected     Parainfluenza Virus 2 Not Detected     Parainfluenza Virus 3 Not Detected     Parainfluenza Virus 4 Not Detected     RSV, PCR Not Detected     Bordetella pertussis pcr Not Detected     Bordetella parapertussis PCR Not Detected     Chlamydophila  pneumoniae PCR Not Detected     Mycoplasma pneumo by PCR Not Detected    Narrative:      In the setting of a positive respiratory panel with a viral infection PLUS a negative procalcitonin without other underlying concern for bacterial infection, consider observing off antibiotics or discontinuation of antibiotics and continue supportive care. If the respiratory panel is positive for atypical bacterial infection (Bordetella pertussis, Chlamydophila pneumoniae, or Mycoplasma pneumoniae), consider antibiotic de-escalation to target atypical bacterial infection.                amiodarone, 200 mg, Oral, BID With Meals  aspirin, 81 mg, Oral, Daily  pantoprazole, 40 mg, Oral, Daily  prasugrel, 5 mg, Oral, Daily  rosuvastatin, 10 mg, Oral, Daily  senna-docusate sodium, 2 tablet, Oral, BID      DOBUTamine, 5 mcg/kg/min, Last Rate: Stopped (07/15/23 0840)  norepinephrine, 0.02-0.3 mcg/kg/min, Last Rate: 0.09 mcg/kg/min (07/16/23 0620)        Diagnostics:  Adult Transthoracic Echo Complete W/ Cont if Necessary Per Protocol    Result Date: 7/14/2023    Left ventricular systolic function is moderately decreased. Left ventricular ejection fraction appears to be 36 - 40%.   The findings are consistent with dilated cardiomyopathy.   Left ventricular diastolic function was indeterminate.   Estimated right ventricular systolic pressure from tricuspid regurgitation is normal (<35 mmHg).     CT Cervical Spine Without Contrast    Result Date: 7/12/2023  CT CERVICAL SPINE WITHOUT CONTRAST  HISTORY: Fall, neck pain.  COMPARISON: CT cervical spine 04/05/2018  FINDINGS: There is a grade 1 anterolisthesis of C4 upon C5 estimated to be approximately 2 mm. There is moderate loss of disc height at C4-5 and C5-6. There is no evidence of prevertebral edema.  C2-3: A small central disc osteophyte complex is present with no evidence of herniation. Moderate facet degenerative disease is present on the right.  C3-4: Mild facet degenerative disease  is present bilaterally.  C4-5: Moderate to severe facet degenerative disease is present on the left.  C5-6: A mild central disc osteophyte complex is present with no evidence of herniation. Moderate to severe foraminal stenosis is present on the right secondary to uncovertebral degenerative disease.  C6-7: There is no evidence of disc bulge or herniation.  C7-T1: There is no evidence of disc bulge or herniation.      Multilevel degenerative disease involving the cervical spine is noted as described above with no evidence of fracture. Further evaluation could be performed with a MRI examination of the cervical spine as indicated.    Radiation dose reduction techniques were utilized, including automated exposure control and exposure modulation based on body size.  This report was finalized on 7/12/2023 2:39 PM by Dr. Abram Ayala M.D.      XR Chest 1 View    Result Date: 7/13/2023  XR CHEST 1 VW-  HISTORY: Female who is 88 years-old,  respiratory failure  TECHNIQUE: Frontal view of the chest  COMPARISON: 07/11/2023  FINDINGS: The patient is rotated towards the right. The heart appears enlarged. Aorta is tortuous, calcified. Pulmonary vasculature is unremarkable. No focal pulmonary consolidation, pleural effusion, or pneumothorax. Chronic left humerus deformity is noted. No acute osseous process.      No focal pulmonary consolidation. Cardiomegaly. Tortuous aorta.  This report was finalized on 7/13/2023 5:47 PM by Dr. Amilcar Marquez M.D.      XR Chest 1 View    Result Date: 7/11/2023  XR CHEST 1 VW-  HISTORY: Female who is 88 years-old,  short of breath  TECHNIQUE: Frontal views of the chest  COMPARISON: None available  FINDINGS: The heart is enlarged. Aorta is tortuous. Mild prominence of interstitial markings. Minimal likely atelectasis at the bases. No pleural effusion, or pneumothorax. No acute osseous process.      Minimal likely atelectasis at the bases. Cardiomegaly with mild prominence of interstitial  markings. Tortuous aorta.  This report was finalized on 7/11/2023 12:18 PM by Dr. Amilcar Marquez M.D.      US Abdomen Limited    Result Date: 7/13/2023  Examination: Renal sonogram  TECHNIQUE: Sonographic images of the kidneys and urinary bladder were obtained  HISTORY:Nausea  COMPARISON: 03/25/2016  FINDINGS: The pancreatic head and body are normal, with mildly dilated, 3 mm pancreatic duct. The visualized portions of the proximal IVC and aorta are normal. There is coarsened hepatic echotexture, without mass seen in visualized portions. The portal vein is patent, with antegrade flow. The common duct is dilated, measuring 1.2 cm, previously 0.4 cm. The right kidney is echogenic, without hydronephrosis, measuring approximately 6.5 cm with a simple appearing upper pole cyst measuring 4.5 cm. Additional simple appearing right renal cysts are seen.      1. Interval common duct dilation. Correlate with history and laboratory findings and ERCP or MRCP if clinically indicated 2. Prominent pancreatic duct. Correlate with the above 3. Coarsened hepatic echotexture, compatible with hepatocellular disease, without mass seen in visualized portions 4. Echogenic kidneys, compatible with renal parenchymal disease, with simple appearing cysts, not requiring follow-up.  This report was finalized on 7/13/2023 12:50 PM by Dr. Francisco Bhatt M.D.      Results for orders placed during the hospital encounter of 07/11/23    Adult Transthoracic Echo Complete W/ Cont if Necessary Per Protocol    Interpretation Summary    Left ventricular systolic function is moderately decreased. Left ventricular ejection fraction appears to be 36 - 40%.    The findings are consistent with dilated cardiomyopathy.    Left ventricular diastolic function was indeterminate.    Estimated right ventricular systolic pressure from tricuspid regurgitation is normal (<35 mmHg).          Active Hospital Problems    Diagnosis  POA    DDD (degenerative disc disease),  cervical [M50.30]  Yes    Cervical pain (neck) [M54.2]  Yes    CHF (congestive heart failure) [I50.9]  Yes      Resolved Hospital Problems    Diagnosis Date Resolved POA    Closed left hip fracture [S72.002A] 07/11/2023 Yes         Assessment & Plan     Acute hypoxic respiratory failure wean O2 as tolerated I think the principal cause of her respiratory failure is cardiogenic pulmonary edema  Acute on chronic heart failure reduced ejection fraction diuresis per nephrology and cardiology fortunately her cardiogenic shock really makes diuresis difficult  Cardiogenic shock patient is now just on Levophed adequate blood pressure  Cardiac arrhythmias has been done better today she is in sinus rhythm now on p.o. amiodarone  Acute kidney injury on chronic kidney disease stage IV nephrology following renal function improved from yesterday  Coronary artery disease  Ischemic cardiomyopathy LVEF 36 to 40%  Transient altered mental status yesterday I think really corresponded to hypoperfusion she is normal today.  Protein calorie malnutrition  Anemia her iron saturation was a little low but total iron was normal probably predominantly anemia of chronic disease there may be some mild iron deficiency component   severe thoracic kyphoscoliosis  Common bile duct dilatation, history of pancreatic stent and some coarsening of echogenic texture of the liver  DNR/DNI    Plan for disposition: We will continue same therapy when the patient's other son gets here we will let them talk.  Her son is there currently talked with her and she told him she did not want to live to be 89 she was tired of this and she was ready to go    Derek Olivier Jr, MD  07/16/23  06:58 EDT    Time: Critical care time 39 minutes

## 2023-07-17 NOTE — PROGRESS NOTES
Mariylnn Gil   88 y.o.  female    LOS: 5 days   Patient Care Team:  Pam Charles APRN as PCP - General (Internal Medicine)  Joycelyn Zapata MD (Inactive) as PCP - Family Medicine      Subjective     Interval History:     Patient Complaints:  Episode of narrow complex tachycardia with a respiratory depression spontaneously resolved prior to adenosine administration sats improved patient with a near syncopal episode at that time    No further episodes         Medication Review:   Current Facility-Administered Medications:     acetaminophen (TYLENOL) tablet 650 mg, 650 mg, Oral, Q4H PRN, Lacey Garibay MD, 650 mg at 07/11/23 2254    albuterol (PROVENTIL) nebulizer solution 0.083% 2.5 mg/3mL, 2.5 mg, Nebulization, Q6H PRN, Lacey Garibay MD    amiodarone (PACERONE) tablet 200 mg, 200 mg, Oral, BID With Meals, Tien Tello MD, 200 mg at 07/16/23 1800    aspirin chewable tablet 81 mg, 81 mg, Oral, Daily, Lacey Garibay MD, 81 mg at 07/16/23 0827    sennosides-docusate (PERICOLACE) 8.6-50 MG per tablet 2 tablet, 2 tablet, Oral, BID, 2 tablet at 07/16/23 0826 **AND** polyethylene glycol (MIRALAX) packet 17 g, 17 g, Oral, Daily PRN **AND** bisacodyl (DULCOLAX) EC tablet 5 mg, 5 mg, Oral, Daily PRN **AND** bisacodyl (DULCOLAX) suppository 10 mg, 10 mg, Rectal, Daily PRN, Lacey Garibay MD    dextrose (D50W) (25 g/50 mL) IV injection 25 g, 25 g, Intravenous, Q15 Min PRN, Lacey Garibay MD    dextrose (GLUTOSE) oral gel 15 g, 15 g, Oral, Q15 Min PRN, Lacey Garibay MD    diphenhydrAMINE (BENADRYL) injection 25 mg, 25 mg, Intravenous, Q6H PRN, Derek Olivier Jr., MD, 25 mg at 07/16/23 1800    DOBUTamine (DOBUTREX) 1 mg/mL infusion, 5 mcg/kg/min, Intravenous, Titrated, Francisco Romero APRN, Stopped at 07/15/23 0840    fentaNYL citrate (PF) (SUBLIMAZE) injection 12.5 mcg, 12.5 mcg, Intravenous, Q4H PRN, Teodoro Flynn MD, 12.5 mcg at 07/16/23 0508     glucagon (GLUCAGEN) injection 1 mg, 1 mg, Intramuscular, Q15 Min PRN, Lacey Garibay MD    HYDROcodone-acetaminophen (NORCO) 5-325 MG per tablet 1 tablet, 1 tablet, Oral, Q6H PRN, Lacey Garibay MD, 1 tablet at 07/16/23 1800    HYDROmorphone (DILAUDID) injection 0.5 mg, 0.5 mg, Intravenous, Q2H PRN, Jose Mejia MD, 0.5 mg at 07/14/23 2332    melatonin tablet 3 mg, 3 mg, Oral, Nightly PRN, Lacey Garibay MD, 3 mg at 07/11/23 2253    norepinephrine (LEVOPHED) 8 mg in 250 mL NS infusion (premix), 0.02-0.3 mcg/kg/min, Intravenous, Titrated, Francisco Romero, APRN, Last Rate: 7.27 mL/hr at 07/16/23 2153, 0.09 mcg/kg/min at 07/16/23 2153    ondansetron (ZOFRAN) tablet 4 mg, 4 mg, Oral, Q4H PRN **OR** ondansetron (ZOFRAN) injection 4 mg, 4 mg, Intravenous, Q4H PRN, Teodoro Flynn MD, 4 mg at 07/15/23 2120    pantoprazole (PROTONIX) EC tablet 40 mg, 40 mg, Oral, Daily, Lacey Garibay MD, 40 mg at 07/16/23 0826    potassium chloride (KLOR-CON) packet 20 mEq, 20 mEq, Oral, Once, Magdiel Umana MD    prasugrel (EFFIENT) tablet 5 mg, 5 mg, Oral, Daily, Lacey Garibay MD, 5 mg at 07/16/23 0827    promethazine (PHENERGAN) tablet 12.5 mg, 12.5 mg, Oral, Q6H PRN, 12.5 mg at 07/12/23 1050 **OR** promethazine (PHENERGAN) suppository 12.5 mg, 12.5 mg, Rectal, Q6H PRN, Jose Mejia MD    rosuvastatin (CRESTOR) tablet 10 mg, 10 mg, Oral, Daily, Stingl, Lacey Singh MD, 10 mg at 07/16/23 0826    [COMPLETED] Insert Peripheral IV, , , Once **AND** sodium chloride 0.9 % flush 10 mL, 10 mL, Intravenous, PRN, Carlton España MD, 10 mL at 07/16/23 0828    zolpidem (AMBIEN) tablet 5 mg, 5 mg, Oral, Nightly PRN, Jose Mejia MD, 5 mg at 07/16/23 0140      Objective   Vital Sign Min/Max for last 24 hours  Temp  Min: 98.6 °F (37 °C)  Max: 98.7 °F (37.1 °C)   BP  Min: 99/83  Max: 146/69    Pulse  Min: 67  Max: 95     Wt Readings from Last 3 Encounters:   07/16/23  47.9 kg (105 lb 9.6 oz)   07/11/23 47.2 kg (104 lb)   06/23/23 45.9 kg (101 lb 3.2 oz)        Intake/Output Summary (Last 24 hours) at 7/16/2023 2202  Last data filed at 7/16/2023 0400  Gross per 24 hour   Intake 91 ml   Output 300 ml   Net -209 ml       Physical Exam:      General Appearance:    Well developed and well nourished elderly female with soa at rest on o2   Head:    Normocephalic, atraumatic   Eyes:            Conjunctivae normal, anicteric, no xanthelasma   Neck:   supple, trachea midline, no thyromegaly, no carotid bruit, +JVD, + elevated CVP   Lungs:     Clear to auscultation ant with accessory muscle use    Heart:    Regular rhythm and normal rate, normal S1 and S2,            No murmur, no gallop, no rub, no click   Chest Wall:    No abnormalities observed   Abdomen:     Normal bowel sounds, no masses, no organomegaly, soft        nontender, nondistended, no guarding, no rebound                tenderness   Rectal:     Deferred   Extremities:   No edema. Moves all extremities well, no cyanosis, no erythema   Pulses:   Pulses palpable and equal bilaterally   Skin:   No bleeding, bruising or rash   Neurologic:   awake alert and oriented x3, speech clear and approp, no facial drooping     : voids   Monitor:  sinus tach    Results Review:         Sodium Sodium   Date Value Ref Range Status   07/16/2023 142 136 - 145 mmol/L Final   07/15/2023 137 136 - 145 mmol/L Final   07/14/2023 136 136 - 145 mmol/L Final      Potassium Potassium   Date Value Ref Range Status   07/16/2023 3.9 3.5 - 5.2 mmol/L Final   07/15/2023 4.0 3.5 - 5.2 mmol/L Final   07/14/2023 4.1 3.5 - 5.2 mmol/L Final      Chloride Chloride   Date Value Ref Range Status   07/16/2023 100 98 - 107 mmol/L Final   07/15/2023 100 98 - 107 mmol/L Final   07/14/2023 97 (L) 98 - 107 mmol/L Final      Bicarbonate No results found for: PLASMABICARB   BUN BUN   Date Value Ref Range Status   07/16/2023 39 (H) 8 - 23 mg/dL Final   07/15/2023 45 (H) 8 -  23 mg/dL Final   07/14/2023 49 (H) 8 - 23 mg/dL Final      Creatinine Creatinine   Date Value Ref Range Status   07/16/2023 2.32 (H) 0.57 - 1.00 mg/dL Final   07/15/2023 2.65 (H) 0.57 - 1.00 mg/dL Final   07/14/2023 3.35 (H) 0.57 - 1.00 mg/dL Final      Calcium Calcium   Date Value Ref Range Status   07/16/2023 8.9 8.6 - 10.5 mg/dL Final   07/15/2023 8.4 (L) 8.6 - 10.5 mg/dL Final   07/14/2023 8.6 8.6 - 10.5 mg/dL Final      Magnesium Magnesium   Date Value Ref Range Status   07/15/2023 2.2 1.6 - 2.4 mg/dL Final   07/14/2023 2.2 1.6 - 2.4 mg/dL Final        Results from last 7 days   Lab Units 07/16/23  0425   WBC 10*3/mm3 8.88   HEMOGLOBIN g/dL 10.5*   HEMATOCRIT % 33.4*   PLATELETS 10*3/mm3 226       Lab Results   Lab Value Date/Time    TROPONINT 44 (H) 07/12/2023 0424    TROPONINT 43 (H) 07/12/2023 0012    TROPONINT 35 (H) 07/11/2023 2205    TROPONINT 40 (H) 07/11/2023 1236            Echo EF Estimated  No results found for: ECHOEFEST      Assessment/ Plan  Assessment & Plan   Active Hospital Problems    Diagnosis  POA    DDD (degenerative disc disease), cervical [M50.30]  Yes    Cervical pain (neck) [M54.2]  Yes    CHF (congestive heart failure) [I50.9]  Yes       1. ICM  2. Acute on chronic HFrEF  Stage D class IV    A. 2009 and a catheterization. Ejection fraction was 40%. There was no discrete clot in her apex, which had been noted years ago. There was an 80 percent circumflex stenosis, which was stented. She had a previous LAD PTCA, which was patent.    B. Per office note- refuses AICD  3. CAD  4. Hypoxia  5. Falls   6. Neck and shoulder pain  7. HLD  8. DDD  9. Chronic pancreatitis   10.  Acute kidney injury on chronic kidney disease most likely related to cardiorenal syndrome   Renal foll  Creat 3.19     PLAN  Now hypotensive with difficulty diuresing on levophed and Dobutrex, sys bp 115 on gtts and await renal today for diuresis  Discussed AICD with pt and family, they will consider    MRI C spine  pending    Interval plan with narrow complex tachycardia we will add amiodarone 200 mg twice daily discussed the plan with family/granddaughter and patient continue to monitor for further arrhythmias    No further tachycardia we will continue amiodarone 200 mg twice daily.      Leighton Beasley MD  07/16/23  22:02 EDT

## 2023-07-17 NOTE — SIGNIFICANT NOTE
07/17/23 1332   OTHER   Discipline speech language pathologist   Rehab Time/Intention   Session Not Performed other (see comments)  (Discussed with RN. Patient tolerating diet and eval not warranted at this time. SLP to s/o, please re-consult as warranted.)

## 2023-07-17 NOTE — PLAN OF CARE
Goal Outcome Evaluation:     Pt made palliative this afternoon. Transferring to [facility].

## 2023-07-17 NOTE — PROGRESS NOTES
LOS: 6 days   Patient Care Team:  Pam Charles APRN as PCP - General (Internal Medicine)  Joycelyn Zapata MD (Inactive) as PCP - Family Medicine    Subjective       Patient was transferred to the ICU with concerns for cardiogenic shock with acute kidney injury on chronic kidney disease and shortness of breath she has a history of coronary disease stage IV chronic kidney disease, prior CVA, DVTs, diabetes, pancreatic duct stent and hepatobiliary abnormalities who presented to hospital with neck and shoulder pain and shortness of breath was found to be in respiratory failure and CHF and hypotensive after diuresis.  Patient son arrived from out of town.  She seems to be fairly certain she wants to pursue a palliative course he would like to speak with the palliative care team and her cardiologist.  She is having some low lower lumbar upper sacral area pain and she is having some itching at her IV site.  He feels a little short of breath and mildly nauseated.        Review of Systems:          Objective     Vital Signs  Vital Sign Min/Max for last 24 hours  Temp  Min: 98.2 °F (36.8 °C)  Max: 98.6 °F (37 °C)   BP  Min: 93/33  Max: 141/128   Pulse  Min: 59  Max: 95   Resp  Min: 11  Max: 11   SpO2  Min: 94 %  Max: 100 %   Flow (L/min)  Min: 15  Max: 15   Weight  Min: 46.7 kg (102 lb 15.3 oz)  Max: 46.7 kg (102 lb 15.3 oz)        Ventilator/Non-Invasive Ventilation Settings (From admission, onward)      None                         Body mass index is 21.52 kg/m².  I/O last 3 completed shifts:  In: 931 [P.O.:840; I.V.:91]  Out: 1000 [Urine:1000]  No intake/output data recorded.        Physical Exam:  General Appearance: Thin elderly white female looks about the same as yesterday she is still on 12 L high flow nasal cannula O2 with oxygen saturations between 90 and 91%.  She is on Levophed she is up to 0.1 mics per kilogram per hour  Eyes: Conjunctiva are clear and anicteric pupils about 3 mm  ENT: Oral mucous  membranes are moist no erythema no exudates, nasal septum midline  Neck: Trachea midline lying at about 60 degrees she does have jugular venous distention  Lungs: Coarse rales in the bases bilaterally to about mid lung I would say a little better than yesterday the right is a little worse than the left  Cardiac: Regular rate rhythm now sinus on the monitor in the mid 70s  Abdomen: Soft no palpable hepatosplenomegaly  : Not examined  Musculoskeletal: She has at least moderate thoracic kyphosis may be moderately severe.  She has a little bit of tenderness to pressure right at the L5-S1 junction  Skin: Warm and dry no jaundice no petechiae   Neuro: She is  awake alert conversant and oriented she is following commands and moving extremities she is pretty weak  Extremities/P Vascular: No clubbing no cyanosis no edema palpable radial and dorsalis pedis pulses  MSE: Very pleasant lady       Labs:  Results from last 7 days   Lab Units 07/17/23  0531 07/16/23  0425 07/15/23  0402 07/14/23  0825 07/13/23  1537 07/13/23  0450 07/12/23  1451 07/12/23  0424 07/11/23  1236   GLUCOSE mg/dL 114* 108* 114* 107* 78 101*  --  99 108*   SODIUM mmol/L 140 142 137 136 139 139  --  144 142   POTASSIUM mmol/L 3.9 3.9 4.0 4.1 4.3 5.0  --  4.0 4.1   MAGNESIUM mg/dL  --   --  2.2 2.2  --  2.2  --   --   --    CO2 mmol/L 30.6* 31.5* 27.0 27.0 25.5 30.0*  --  29.1* 28.7   CHLORIDE mmol/L 103 100 100 97* 99 98  --  102 103   ANION GAP mmol/L 6.4 10.5 10.0 12.0 14.5 11.0  --  12.9 10.3   CREATININE mg/dL 2.18* 2.32* 2.65* 3.35* 3.19* 2.86*  --  1.76* 1.86*   BUN mg/dL 37* 39* 45* 49* 47* 46*  --  31* 28*   BUN / CREAT RATIO  17.0 16.8 17.0 14.6 14.7 16.1  --  17.6 15.1   CALCIUM mg/dL 8.3* 8.9 8.4* 8.6 8.3* 8.8  --  9.2 9.2   ALK PHOS U/L  --  113  --  125*  --  131* 135*  --  136*   TOTAL PROTEIN g/dL  --  6.3  --  6.2  --  6.5 6.8  --  7.2   ALT (SGPT) U/L  --  13  --  10  --  13 11  --  12   AST (SGOT) U/L  --  17  --  25  --  28 24  --  24    BILIRUBIN mg/dL  --  0.3  --  0.3  --  0.3 0.3  --  0.3   ALBUMIN g/dL  --  3.5 3.3* 3.4* 3.6 3.8 3.9  --  4.2   GLOBULIN gm/dL  --  2.8  --  2.8  --  2.7  --   --  3.0     Estimated Creatinine Clearance: 13.7 mL/min (A) (by C-G formula based on SCr of 2.18 mg/dL (H)).      Results from last 7 days   Lab Units 07/17/23  0531 07/16/23  0425 07/15/23  0402 07/14/23  0824 07/13/23  0450 07/12/23  0424 07/11/23  1236   WBC 10*3/mm3 8.23 8.88 7.74 8.65 7.33 6.01 7.04   RBC 10*6/mm3 4.33 4.20 4.01 4.15 4.13 4.16 4.17   HEMOGLOBIN g/dL 10.6* 10.5* 9.9* 10.4* 10.3* 10.4* 10.4*   HEMATOCRIT % 35.2 33.4* 32.3* 33.7* 34.2 33.6* 34.3   MCV fL 81.3 79.5 80.5 81.2 82.8 80.8 82.3   MCH pg 24.5* 25.0* 24.7* 25.1* 24.9* 25.0* 24.9*   MCHC g/dL 30.1* 31.4* 30.7* 30.9* 30.1* 31.0* 30.3*   RDW % 15.7* 15.4 15.5* 15.5* 15.5* 15.8* 15.8*   RDW-SD fl 46.4 44.4 44.9 45.3 46.5 46.5 46.9   MPV fL 9.0 8.5 8.8 8.4 8.7 8.8 8.5   PLATELETS 10*3/mm3 243 226 217 193 214 187 204   NEUTROPHIL % % 62.4 60.2 64.3 65.3  --   --  55.9   LYMPHOCYTE % % 17.6* 19.4* 16.8* 19.9  --   --  30.8   MONOCYTES % % 14.6* 15.0* 14.6* 12.8*  --   --  9.7   EOSINOPHIL % % 4.6 4.7 3.5 1.5  --   --  3.1   BASOPHIL % % 0.6 0.5 0.4 0.3  --   --  0.4   IMM GRAN % % 0.2 0.2  --  0.2  --   --  0.1   NEUTROS ABS 10*3/mm3 5.13 5.35 4.98 5.64  --   --  3.93   LYMPHS ABS 10*3/mm3 1.45 1.72 1.30 1.72  --   --  2.17   MONOS ABS 10*3/mm3 1.20* 1.33* 1.13* 1.11*  --   --  0.68   EOS ABS 10*3/mm3 0.38 0.42* 0.27 0.13  --   --  0.22   BASOS ABS 10*3/mm3 0.05 0.04 0.03 0.03  --   --  0.03   IMMATURE GRANS (ABS) 10*3/mm3 0.02 0.02  --  0.02  --   --  0.01   NRBC /100 WBC 0.0 0.0  --  0.0  --   --  0.0         Results from last 7 days   Lab Units 07/12/23  0424 07/12/23  0012 07/11/23  2205   HSTROP T ng/L 44* 43* 35*     Results from last 7 days   Lab Units 07/11/23  1236   PROBNP pg/mL 3,635.0*         Results from last 7 days   Lab Units 07/13/23  1819 07/11/23  1236   LACTATE  mmol/L 1.1  --    PROCALCITONIN ng/mL  --  0.10     Results from last 7 days   Lab Units 07/11/23  1236   INR  0.93     Microbiology Results (last 10 days)       Procedure Component Value - Date/Time    Respiratory Panel PCR w/COVID-19(SARS-CoV-2) RICARDA/JACK/KRUNAL/PAD/COR/MAD/CANDICE In-House, NP Swab in UTM/VTM, 3-4 HR TAT - Swab, Nasopharynx [[id number]]  (Normal) Collected: 07/11/23 1239    Lab Status: Final result Specimen: Swab from Nasopharynx Updated: 07/11/23 2534     ADENOVIRUS, PCR Not Detected     Coronavirus 229E Not Detected     Coronavirus HKU1 Not Detected     Coronavirus NL63 Not Detected     Coronavirus OC43 Not Detected     COVID19 Not Detected     Human Metapneumovirus Not Detected     Human Rhinovirus/Enterovirus Not Detected     Influenza A PCR Not Detected     Influenza B PCR Not Detected     Parainfluenza Virus 1 Not Detected     Parainfluenza Virus 2 Not Detected     Parainfluenza Virus 3 Not Detected     Parainfluenza Virus 4 Not Detected     RSV, PCR Not Detected     Bordetella pertussis pcr Not Detected     Bordetella parapertussis PCR Not Detected     Chlamydophila pneumoniae PCR Not Detected     Mycoplasma pneumo by PCR Not Detected    Narrative:      In the setting of a positive respiratory panel with a viral infection PLUS a negative procalcitonin without other underlying concern for bacterial infection, consider observing off antibiotics or discontinuation of antibiotics and continue supportive care. If the respiratory panel is positive for atypical bacterial infection (Bordetella pertussis, Chlamydophila pneumoniae, or Mycoplasma pneumoniae), consider antibiotic de-escalation to target atypical bacterial infection.                amiodarone, 200 mg, Oral, BID With Meals  aspirin, 81 mg, Oral, Daily  pantoprazole, 40 mg, Oral, Daily  potassium chloride, 20 mEq, Oral, Once  prasugrel, 5 mg, Oral, Daily  rosuvastatin, 10 mg, Oral, Daily  senna-docusate sodium, 2 tablet, Oral, BID      DOBUTamine,  5 mcg/kg/min, Last Rate: Stopped (07/15/23 0840)  norepinephrine, 0.02-0.3 mcg/kg/min, Last Rate: 0.1 mcg/kg/min (07/17/23 0652)        Diagnostics:  Adult Transthoracic Echo Complete W/ Cont if Necessary Per Protocol    Result Date: 7/14/2023    Left ventricular systolic function is moderately decreased. Left ventricular ejection fraction appears to be 36 - 40%.   The findings are consistent with dilated cardiomyopathy.   Left ventricular diastolic function was indeterminate.   Estimated right ventricular systolic pressure from tricuspid regurgitation is normal (<35 mmHg).     CT Cervical Spine Without Contrast    Result Date: 7/12/2023  CT CERVICAL SPINE WITHOUT CONTRAST  HISTORY: Fall, neck pain.  COMPARISON: CT cervical spine 04/05/2018  FINDINGS: There is a grade 1 anterolisthesis of C4 upon C5 estimated to be approximately 2 mm. There is moderate loss of disc height at C4-5 and C5-6. There is no evidence of prevertebral edema.  C2-3: A small central disc osteophyte complex is present with no evidence of herniation. Moderate facet degenerative disease is present on the right.  C3-4: Mild facet degenerative disease is present bilaterally.  C4-5: Moderate to severe facet degenerative disease is present on the left.  C5-6: A mild central disc osteophyte complex is present with no evidence of herniation. Moderate to severe foraminal stenosis is present on the right secondary to uncovertebral degenerative disease.  C6-7: There is no evidence of disc bulge or herniation.  C7-T1: There is no evidence of disc bulge or herniation.      Multilevel degenerative disease involving the cervical spine is noted as described above with no evidence of fracture. Further evaluation could be performed with a MRI examination of the cervical spine as indicated.    Radiation dose reduction techniques were utilized, including automated exposure control and exposure modulation based on body size.  This report was finalized on 7/12/2023  2:39 PM by Dr. Abram Ayala M.D.      XR Chest 1 View    Result Date: 7/13/2023  XR CHEST 1 VW-  HISTORY: Female who is 88 years-old,  respiratory failure  TECHNIQUE: Frontal view of the chest  COMPARISON: 07/11/2023  FINDINGS: The patient is rotated towards the right. The heart appears enlarged. Aorta is tortuous, calcified. Pulmonary vasculature is unremarkable. No focal pulmonary consolidation, pleural effusion, or pneumothorax. Chronic left humerus deformity is noted. No acute osseous process.      No focal pulmonary consolidation. Cardiomegaly. Tortuous aorta.  This report was finalized on 7/13/2023 5:47 PM by Dr. Amilcar Marquez M.D.      XR Chest 1 View    Result Date: 7/11/2023  XR CHEST 1 VW-  HISTORY: Female who is 88 years-old,  short of breath  TECHNIQUE: Frontal views of the chest  COMPARISON: None available  FINDINGS: The heart is enlarged. Aorta is tortuous. Mild prominence of interstitial markings. Minimal likely atelectasis at the bases. No pleural effusion, or pneumothorax. No acute osseous process.      Minimal likely atelectasis at the bases. Cardiomegaly with mild prominence of interstitial markings. Tortuous aorta.  This report was finalized on 7/11/2023 12:18 PM by Dr. Amilcar Marquez M.D.      US Abdomen Limited    Result Date: 7/13/2023  Examination: Renal sonogram  TECHNIQUE: Sonographic images of the kidneys and urinary bladder were obtained  HISTORY:Nausea  COMPARISON: 03/25/2016  FINDINGS: The pancreatic head and body are normal, with mildly dilated, 3 mm pancreatic duct. The visualized portions of the proximal IVC and aorta are normal. There is coarsened hepatic echotexture, without mass seen in visualized portions. The portal vein is patent, with antegrade flow. The common duct is dilated, measuring 1.2 cm, previously 0.4 cm. The right kidney is echogenic, without hydronephrosis, measuring approximately 6.5 cm with a simple appearing upper pole cyst measuring 4.5 cm. Additional  simple appearing right renal cysts are seen.      1. Interval common duct dilation. Correlate with history and laboratory findings and ERCP or MRCP if clinically indicated 2. Prominent pancreatic duct. Correlate with the above 3. Coarsened hepatic echotexture, compatible with hepatocellular disease, without mass seen in visualized portions 4. Echogenic kidneys, compatible with renal parenchymal disease, with simple appearing cysts, not requiring follow-up.  This report was finalized on 7/13/2023 12:50 PM by Dr. Francisco Bhatt M.D.      Results for orders placed during the hospital encounter of 07/11/23    Adult Transthoracic Echo Complete W/ Cont if Necessary Per Protocol    Interpretation Summary    Left ventricular systolic function is moderately decreased. Left ventricular ejection fraction appears to be 36 - 40%.    The findings are consistent with dilated cardiomyopathy.    Left ventricular diastolic function was indeterminate.    Estimated right ventricular systolic pressure from tricuspid regurgitation is normal (<35 mmHg).      EKG today reviewed looks like a sinus with a left bundle block and frequent PVCs    Active Hospital Problems    Diagnosis  POA    DDD (degenerative disc disease), cervical [M50.30]  Yes    Cervical pain (neck) [M54.2]  Yes    CHF (congestive heart failure) [I50.9]  Yes      Resolved Hospital Problems    Diagnosis Date Resolved POA    Closed left hip fracture [S72.002A] 07/11/2023 Yes         Assessment & Plan     Acute hypoxic respiratory failure wean O2 as tolerated I think the principal cause of her respiratory failure is cardiogenic pulmonary edema she may have a little atelectasis in the left base.  Acute on chronic heart failure reduced ejection fraction diuresis per nephrology and cardiology unfortunately her cardiogenic shock really makes diuresis difficult  Cardiogenic shock patient is now just on Levophed adequate blood pressure.  I discussed with the patient and her son were  having troubles with the IVs I really think we continue with Levophed she is going to need some sort of central venous catheter.  She is wanting palliative care if she does that then we would not need such.  Cardiac arrhythmias has been done better today she is in sinus rhythm now on p.o. amiodarone  Acute kidney injury on chronic kidney disease stage IV nephrology following renal function improved from yesterday, hypotension really makes diuresis difficult  Coronary artery disease  Ischemic cardiomyopathy LVEF 36 to 40%  Transient altered mental status yesterday I think really corresponded to hypoperfusion she is normal today.  Protein calorie malnutrition  Anemia her iron saturation was a little low but total iron was normal probably predominantly anemia of chronic disease there may be some mild iron deficiency component   severe thoracic kyphoscoliosis  Common bile duct dilatation, history of pancreatic stent and some coarsening of echogenic texture of the liver  DNR/DNI    Plan for disposition: I think patient wants to move to palliative care I am not sure her family is is ready as she is, they are to meet with the palliative care team today the son wants to talk with cardiology.  I told the patient if she wants to fight we certainly will fight with her I do think it is going to be a long flight and she is going to have to put up with quite a bit of sticking prodding and she is probably can have frequent stays in the hospital she indicate that she knows that and that is not what she wants.  I told her if she did opt for palliative care we could keep her comfortable help her with pain and air hunger etc. apparently she is familiar with palliative care.    Derek Olivier Jr, MD  07/17/23  07:21 EDT    Time: Critical care time 36 minutes

## 2023-07-17 NOTE — PROGRESS NOTES
Pulmonary/critical care update    Patient and family had a chance to talk with cardiology and the palliative care team and patient is very clear she wants to go full palliative care and the family was in the room with her supports her decision

## 2023-07-17 NOTE — NURSING NOTE
Spoke with patient at bedside along with two of her sons Kirk and Kenroy, granddaughter, brother, and daughter in law. We discussed goals of care and treatment options in detail. We discussed inpatient palliative care unit and medications used to alleviate suffering, [facility] consult. Patient is requesting transfer to palliative care unit later today. Answered all questions and provided support, advised of ongoing availability. Discussed with SUMIT Robledo and Dr. Olivier.

## 2023-07-17 NOTE — PROGRESS NOTES
Marilynn Gil   88 y.o.  female    LOS: 6 days   Patient Care Team:  Pam Charles APRN as PCP - General (Internal Medicine)  Joycelyn Zapata MD (Inactive) as PCP - Family Medicine  Pat Armstrong, [facility] as Ambulatory  ([facility])      Subjective   Awake     Review of Systems:   Lungs: no cough, no soa  CV: no CP, no palpitations  GI: no nausea, vomiting, diarrhea     Medication Review:   Current Facility-Administered Medications:     acetaminophen (TYLENOL) tablet 650 mg, 650 mg, Oral, Q4H PRN, Lacey Garibay MD, 650 mg at 07/16/23 2229    albuterol (PROVENTIL) nebulizer solution 0.083% 2.5 mg/3mL, 2.5 mg, Nebulization, Q6H PRN, Lacey Garibay MD    amiodarone (PACERONE) tablet 200 mg, 200 mg, Oral, BID With Meals, Tien Tello MD, 200 mg at 07/17/23 0813    aspirin chewable tablet 81 mg, 81 mg, Oral, Daily, Lacey Garibay MD, 81 mg at 07/17/23 0813    sennosides-docusate (PERICOLACE) 8.6-50 MG per tablet 2 tablet, 2 tablet, Oral, BID, 2 tablet at 07/17/23 0812 **AND** polyethylene glycol (MIRALAX) packet 17 g, 17 g, Oral, Daily PRN **AND** bisacodyl (DULCOLAX) EC tablet 5 mg, 5 mg, Oral, Daily PRN **AND** bisacodyl (DULCOLAX) suppository 10 mg, 10 mg, Rectal, Daily PRN, Lacey Garibay MD    dextrose (D50W) (25 g/50 mL) IV injection 25 g, 25 g, Intravenous, Q15 Min PRN, Lacey Garibay MD    dextrose (GLUTOSE) oral gel 15 g, 15 g, Oral, Q15 Min PRN, Lacey Garibay MD    diphenhydrAMINE (BENADRYL) injection 25 mg, 25 mg, Intravenous, Q6H PRN, Derek Olivier Jr., MD, 25 mg at 07/16/23 1800    DOBUTamine (DOBUTREX) 1 mg/mL infusion, 5 mcg/kg/min, Intravenous, Titrated, Francisco Romero, APRN, Stopped at 07/15/23 0840    fentaNYL citrate (PF) (SUBLIMAZE) injection 12.5 mcg, 12.5 mcg, Intravenous, Q4H PRN, Teodoro Flynn MD, 12.5 mcg at 07/16/23 0508    glucagon (GLUCAGEN) injection 1 mg, 1 mg, Intramuscular, Q15 Min PRN, Lani  Lacey Singh MD    HYDROcodone-acetaminophen (NORCO) 5-325 MG per tablet 1 tablet, 1 tablet, Oral, Q6H PRN, Lacey Garibay MD, 1 tablet at 07/16/23 1800    HYDROmorphone (DILAUDID) injection 0.5 mg, 0.5 mg, Intravenous, Q2H PRN, Jose Mejia MD, 0.5 mg at 07/14/23 2332    melatonin tablet 3 mg, 3 mg, Oral, Nightly PRN, Lacey Garibay MD, 3 mg at 07/11/23 2253    norepinephrine (LEVOPHED) 8 mg in 250 mL NS infusion (premix), 0.02-0.3 mcg/kg/min, Intravenous, Titrated, Francisco Romero, APRN, Last Rate: 8.08 mL/hr at 07/17/23 0652, 0.1 mcg/kg/min at 07/17/23 0652    ondansetron (ZOFRAN) tablet 4 mg, 4 mg, Oral, Q4H PRN **OR** ondansetron (ZOFRAN) injection 4 mg, 4 mg, Intravenous, Q4H PRN, Teodoro Flynn MD, 4 mg at 07/15/23 2120    pantoprazole (PROTONIX) EC tablet 40 mg, 40 mg, Oral, Daily, Lacey Garibay MD, 40 mg at 07/17/23 0813    potassium chloride (KLOR-CON) packet 20 mEq, 20 mEq, Oral, Once, Magdiel Umana MD    prasugrel (EFFIENT) tablet 5 mg, 5 mg, Oral, Daily, Lacey Garibay MD, 5 mg at 07/17/23 0813    promethazine (PHENERGAN) tablet 12.5 mg, 12.5 mg, Oral, Q6H PRN, 12.5 mg at 07/12/23 1050 **OR** promethazine (PHENERGAN) suppository 12.5 mg, 12.5 mg, Rectal, Q6H PRN, Jose Mejia MD    rosuvastatin (CRESTOR) tablet 10 mg, 10 mg, Oral, Daily, Lacey Garibay MD, 10 mg at 07/17/23 0813    [COMPLETED] Insert Peripheral IV, , , Once **AND** sodium chloride 0.9 % flush 10 mL, 10 mL, Intravenous, PRN, Carlton España MD, 10 mL at 07/16/23 0828    torsemide (DEMADEX) tablet 40 mg, 40 mg, Oral, BID, MistiGeorge torres MD    zolpidem (AMBIEN) tablet 5 mg, 5 mg, Oral, Nightly PRN, Jose Mejia MD, 5 mg at 07/16/23 2229      Objective     Vital Sign Min/Max for last 24 hours  Temp  Min: 98.2 °F (36.8 °C)  Max: 98.9 °F (37.2 °C)   BP  Min: 93/33  Max: 141/128    Pulse  Min: 59  Max: 95         07/15/23  0600 07/16/23  0530  07/17/23  0532   Weight: 47.8 kg (105 lb 6.1 oz) 47.9 kg (105 lb 9.6 oz) 46.7 kg (102 lb 15.3 oz)        Intake/Output Summary (Last 24 hours) at 7/17/2023 1046  Last data filed at 7/17/2023 0800  Gross per 24 hour   Intake 600 ml   Output 700 ml   Net -100 ml       Physical Exam:    General Appearance:     no acute distress   Lungs:     rh bilaterally. No wheezes, rhonchi or rales.     Heart:    Regular rate and rhythm.  Normal S1 and S2. No murmur, no gallop, rub or lift. , no JVD   Abdomen:     Normal bowel sounds, soft, non-tender, non-distended,   Extremities:   No clubbing, cyanosis or edema.     Skin:   Warm, dry   Neurologic:   awake a      Monitor: aftab mendes  Results Review:     I reviewed the patient's new clinical results.    Sodium   Date Value Ref Range Status   07/17/2023 140 136 - 145 mmol/L Final   07/16/2023 142 136 - 145 mmol/L Final   07/15/2023 137 136 - 145 mmol/L Final     Potassium   Date Value Ref Range Status   07/17/2023 3.9 3.5 - 5.2 mmol/L Final     Comment:     Slight hemolysis detected by analyzer. Results may be affected.   07/16/2023 3.9 3.5 - 5.2 mmol/L Final   07/15/2023 4.0 3.5 - 5.2 mmol/L Final     Chloride   Date Value Ref Range Status   07/17/2023 103 98 - 107 mmol/L Final   07/16/2023 100 98 - 107 mmol/L Final   07/15/2023 100 98 - 107 mmol/L Final     No results found for: PLASMABICARB  BUN   Date Value Ref Range Status   07/17/2023 37 (H) 8 - 23 mg/dL Final   07/16/2023 39 (H) 8 - 23 mg/dL Final   07/15/2023 45 (H) 8 - 23 mg/dL Final     Creatinine   Date Value Ref Range Status   07/17/2023 2.18 (H) 0.57 - 1.00 mg/dL Final   07/16/2023 2.32 (H) 0.57 - 1.00 mg/dL Final   07/15/2023 2.65 (H) 0.57 - 1.00 mg/dL Final     Calcium   Date Value Ref Range Status   07/17/2023 8.3 (L) 8.6 - 10.5 mg/dL Final   07/16/2023 8.9 8.6 - 10.5 mg/dL Final   07/15/2023 8.4 (L) 8.6 - 10.5 mg/dL Final     Magnesium   Date Value Ref Range Status   07/15/2023 2.2 1.6 - 2.4 mg/dL Final       Results  from last 7 days   Lab Units 07/17/23  0531   WBC 10*3/mm3 8.23   HEMOGLOBIN g/dL 10.6*   HEMATOCRIT % 35.2   PLATELETS 10*3/mm3 243     Lab Results   Component Value Date    CHOL 249 (H) 10/02/2018    CHOL 232 (H) 05/17/2018    CHOL 221 (H) 01/10/2018     Lab Results   Component Value Date    HDL 57 04/12/2023    HDL 67 (H) 10/11/2022    HDL 65 12/14/2021     Lab Results   Component Value Date     (H) 04/12/2023     (H) 10/11/2022    LDL 86 12/14/2021     Lab Results   Component Value Date    TRIG 243 (H) 04/12/2023    TRIG 161 (H) 10/11/2022    TRIG 125 12/14/2021     Results from last 7 days   Lab Units 07/11/23  1236   INR  0.93     Results from last 7 days   Lab Units 07/12/23  0424 07/12/23  0012 07/11/23  2205 07/11/23  1236   HSTROP T ng/L 44* 43* 35* 40*     Results from last 7 days   Lab Units 07/11/23  1236   PROBNP pg/mL 3,635.0*                    1. ICM  2. Acute on chronic HFrEF  Stage D class IV    A. 2009 and a catheterization. Ejection fraction was 40%. There was no discrete clot in her apex, which had been noted years ago. There was an 80 percent circumflex stenosis, which was stented. She had a previous LAD PTCA, which was patent.    B. Per office note- refuses AICD  3. CAD  4. Hypoxia  5. Falls   6. Neck and shoulder pain  7. HLD  8. DDD  9. Chronic pancreatitis   10.  Acute kidney injury on chronic kidney disease most likely related to cardiorenal syndrome        On amio PO 200mg bid.  Dobutrex stopped due to svt.  Family to speak with palliative care today.  Family requesting to speak with Dr. Jay who will see and evaluate the patient.        Scribed by  Nohemy HENSON    Time:  25min  Patient seen and examined  Family considering palliative care

## 2023-07-17 NOTE — CASE MANAGEMENT/SOCIAL WORK
Discharge Planning Assessment  [facility]     Patient Name: Marilynn Gil  MRN: [id number]  Today's Date: 7/17/2023    Admit Date: 7/11/2023    Plan: Transfer to palliative care   Discharge Needs Assessment       Row Name 07/17/23 1549       Living Environment    People in Home alone    Provides Primary Care For no one, unable/limited ability to care for self    Quality of Family Relationships helpful;involved       Discharge Needs Assessment    Equipment Currently Used at Home none    Concerns to be Addressed care coordination/care conferences                   Discharge Plan       Row Name 07/17/23 1549       Plan    Plan Transfer to palliative care    Patient/Family in Agreement with Plan yes    Plan Comments CCP met with patient's son, Kirk, and granddaughter, Keya, at bedside. Introduced self and explained role. Patient lives alone. She does not drive and relies on Keya for assistance with ADLs and transportation. She fills prescriptions at [facility] and sees Pam Charles for PCP. Her family was encouraged to provide a copy of any AD/LW documents to the hospital to place on file. Kirk denies any DME use. CCP noted plans to transfer to  and will follow clinical course for any discharge planning needs. Laquita COON RN/CCP                  Continued Care and Services - Admitted Since 7/11/2023    Coordination has not been started for this encounter.       Selected Continued Care - Episodes Includes continued care and service providers with selected services from the active episodes listed below      Chronic Care Management Episode start date: 7/17/2023   There are no active outsourced providers for this episode.                 Expected Discharge Date and Time       Expected Discharge Date Expected Discharge Time    Jul 14, 2023            Demographic Summary       Row Name 07/17/23 1549       General Information    Admission Type inpatient    Arrived From home    Required Notices  Provided Important Message from Medicare    Referral Source admission list    Reason for Consult discharge planning                   Functional Status       Row Name 07/17/23 1549       Functional Status    Usual Activity Tolerance moderate    Current Activity Tolerance poor       Functional Status, IADL    Medications assistive person    Meal Preparation assistive person    Housekeeping assistive person    Laundry assistive person    Shopping assistive person                   Psychosocial    No documentation.                  Abuse/Neglect    No documentation.                  Legal    No documentation.                  Substance Abuse    No documentation.                  Patient Forms    No documentation.                     Anna Collazo

## 2023-07-17 NOTE — PROGRESS NOTES
[facility] Progress Note      Patient Name: Marilynn Gil  : 1935  MRN: [id number]  Primary Care Physician:  Pam Charles APRN  Date of admission: 2023    Subjective     Interval History:   F/u NYLA  Patient is feeling better remains on vasopressors she had cardiac dysrhythmias and dobutamine was discontinued denies chest pain, no nausea or vomiting, no abdominal pain    Review of Systems:   As noted above    Objective     Vitals:   Temp:  [98.2 °F (36.8 °C)-98.9 °F (37.2 °C)] 98.9 °F (37.2 °C)  Heart Rate:  [59-95] 67  Resp:  [11-14] 14  BP: ()/() 123/81  Flow (L/min):  [15] 15    Intake/Output Summary (Last 24 hours) at 2023 0833  Last data filed at 2023 0400  Gross per 24 hour   Intake 600 ml   Output 700 ml   Net -100 ml         Physical Exam:    General Appearance: Awake, alert, chronically ill and frail no acute distress  Skin: Warm and dry  Neck: supple, no JVD  Lungs: Bilateral rhonchi, breathing effort not labored  Heart: RRR, normal S1 and S2, no rub  Abdomen: soft, nontender, nondistended normoactive bowel  : pure wick in place   Extremities: no edema, cyanosis or clubbing    Scheduled Meds:     amiodarone, 200 mg, Oral, BID With Meals  aspirin, 81 mg, Oral, Daily  pantoprazole, 40 mg, Oral, Daily  potassium chloride, 20 mEq, Oral, Once  prasugrel, 5 mg, Oral, Daily  rosuvastatin, 10 mg, Oral, Daily  senna-docusate sodium, 2 tablet, Oral, BID      IV Meds:   DOBUTamine, 5 mcg/kg/min, Last Rate: Stopped (07/15/23 0840)  norepinephrine, 0.02-0.3 mcg/kg/min, Last Rate: 0.1 mcg/kg/min (23 0652)        Results Reviewed:   I have personally reviewed the results from the time of this admission to 2023 08:33 EDT     Results from last 7 days   Lab Units 23  0531 23  0425 07/15/23  0402 23  0825 23  1537 23  0450   SODIUM mmol/L 140 142 137 136   < > 139   POTASSIUM mmol/L 3.9 3.9 4.0 4.1   < > 5.0    CHLORIDE mmol/L 103 100 100 97*   < > 98   CO2 mmol/L 30.6* 31.5* 27.0 27.0   < > 30.0*   BUN mg/dL 37* 39* 45* 49*   < > 46*   CREATININE mg/dL 2.18* 2.32* 2.65* 3.35*   < > 2.86*   CALCIUM mg/dL 8.3* 8.9 8.4* 8.6   < > 8.8   BILIRUBIN mg/dL  --  0.3  --  0.3  --  0.3   ALK PHOS U/L  --  113  --  125*  --  131*   ALT (SGPT) U/L  --  13  --  10  --  13   AST (SGOT) U/L  --  17  --  25  --  28   GLUCOSE mg/dL 114* 108* 114* 107*   < > 101*    < > = values in this interval not displayed.       Estimated Creatinine Clearance: 13.7 mL/min (A) (by C-G formula based on SCr of 2.18 mg/dL (H)).  Results from last 7 days   Lab Units 07/15/23  0402 07/14/23  0825 07/13/23  1537 07/13/23  0450   MAGNESIUM mg/dL 2.2 2.2  --  2.2   PHOSPHORUS mg/dL 3.7 4.7* 5.6*  --        Results from last 7 days   Lab Units 07/15/23  0402 07/14/23  0825 07/13/23  0450   URIC ACID mg/dL 9.5* 9.9* 9.4*       Results from last 7 days   Lab Units 07/17/23  0531 07/16/23  0425 07/15/23  0402 07/14/23  0824 07/13/23  0450   WBC 10*3/mm3 8.23 8.88 7.74 8.65 7.33   HEMOGLOBIN g/dL 10.6* 10.5* 9.9* 10.4* 10.3*   PLATELETS 10*3/mm3 243 226 217 193 214       Results from last 7 days   Lab Units 07/11/23  1236   INR  0.93         Assessment / Plan     ASSESSMENT:  Acute kidney injury on chronic kidney disease most likely related to cardiorenal syndrome and decreased renal perfusion in relationship to hypotension and the presence of ACE inhibitor which impedes autoregulation and leading probably to ATN, creatinine slowly improving since the blood pressure improved and it is down to 2.18, electrolyte within acceptable range and volume status appears to be euvolemic  Cardiogenic shock currently on levophed, off dobutamine due to SVT   CAD and ischemic cardiomyopathy, EF 35 to 40% on echo  Acute hypoxic resp failure, improved  SVT - CARD added amiodarone  Acute encephalopathy - ? Seizure related to SVT episode ; the encephalopathy resolved.  Degenerative  disc disease  Anemia, globin stable at 10.6  DNR/DNI status    PLAN:  Add oral torsemide 40 mg twice daily  Continue the same treatment  Surveillance labs  Prognosis very poor given her advanced age and cardiac status.  I discussed the case with Dr. Brooks Olivier also I discussed the case with the patient's son at the bedside    I reviewed other providers notes and reviewed the chart, reviewed imaging and labs  Copied text in this note has been reviewed and is accurate as of 07/17/23.           George Chappell MD  07/17/23  08:33 EDT    [facility]  [phone]

## 2023-07-17 NOTE — PLAN OF CARE
Goal Outcome Evaluation:      Pt still requiring levo to maintain BP, A&Ox4, PERRLA, 700 UO, pt recvd her ambien & was able to sleep much better overnight

## 2023-07-18 NOTE — PROGRESS NOTES
Continued Stay Note  [facility]     Patient Name: Marilynn Gil  MRN: [id number]  Today's Date: 7/18/2023    Admit Date: 7/11/2023    Plan: Transfer to palliative care   Discharge Plan       Row Name 07/18/23 1611       Plan    Plan Comments The patient transferred to [facility] from [facility]U on 7/17/23. The patient is palliative. [facility] to evaluate. CCP will continue to follow for any needs that may arise. NELSON Black RN, CCP.                   Discharge Codes    No documentation.                       Trish Black RN

## 2023-07-18 NOTE — CONSULTS
Visit with patient and family. Patient is unresponsive and appeared comfortable. Patient and family are [employer] and their Rabbi has been up to visit. Patient's puppy Kathy is in the room.  Patient took part in the decision after being sick for many years. Family is aware that chaplains are available for continued care and support.

## 2023-07-18 NOTE — PROGRESS NOTES
Palliative Social Work Note    Purpose of visit: Initial visit  Assessment: Patient sleeping soundly, appears comfortable. PPS: 30%   Support System: Present at bedside, Involved in care, and Family  Psychosocial Needs Identified: None identified at this time  Plan/Other Comments:   Spoke to patient's sons and other family members present at bedside. Family appears to be coping and grieving appropriately. They denied needs and noted that they have had several family members in palliative care and familiar with the process. They shared it has brought them peace because this was patient's decision and she has been clear on her wishes with them over the years. Offered support and advised of availability if needs arise.       Social Work Assessment Tool (SWAT) for Palliative Care Patients and Caregivers          How well are patient and/or primary caregiver doing?    1              2          3               4                 5  Not well  Not too Neutral  Reasonably  Extremely     at all         well                      well              well                          Issue:               Patient                  Primary               Caregiver    1.End of life decisions consistent with their religous and cultural norms.   6 N/A   5 Extremely well   2. Patient thoughts of suicide or wanting to hasten death.  6 N/A 5 Extremely well   3. Anxiety about death    6 N/A 5 Extremely well   4. Preferences about environment (e.g., pets, own bed etc.) 6 N/A 5 Extremely well   5. Social support    6 N/A 5 Extremely well   6. Financial resources 6 N/A 5 Extremely well   7. Safety issues 6 N/A 5 Extremely well   8. Comfort issues 6 N/A 5 Extremely well   9. Complicated anticipatory grief (e.g., guilt, depression, etc.) 6 N/A 5 Extremely well   10. Awareness of prognosis 6 N/A 5 Extremely well   11. Spirituality  (e.g., higher purpose in life, sense of connection with all)  6 N/A 5 Extremely well    Total Score  55

## 2023-07-18 NOTE — PLAN OF CARE
Goal Outcome Evaluation:  Plan of Care Reviewed With: patient, son, family        Progress: declining  Outcome Evaluation: Dilaudid 1mg for pain x2. Ativan 0.5mg IV x1. F/C placed. 8L HFNC. PPS 20%. Family at bedside updated.

## 2023-07-18 NOTE — PROGRESS NOTES
LOS: 7 days   Patient Care Team:  Pam Charles APRN as PCP - General (Internal Medicine)  Joycelyn Zapata MD (Inactive) as PCP - Family Medicine  Pat Armstrong, RN as Ambulatory  ([facility])    Subjective       Patient was transferred to the ICU with concerns for cardiogenic shock with acute kidney injury on chronic kidney disease and shortness of breath she has a history of coronary disease stage IV chronic kidney disease, prior CVA, DVTs, diabetes, pancreatic duct stent and hepatobiliary abnormalities who presented to hospital with neck and shoulder pain and shortness of breath was found to be in respiratory failure and CHF and hypotensive after diuresis.  Patient is resting in the palliative care unit she is pretty sedated but she did wake up she does not seem to have any pain or anxiety she did not express any needs thanked me for coming by        Review of Systems:          Objective     Vital Signs  Vital Sign Min/Max for last 24 hours  Temp  Min: 96.9 °F (36.1 °C)  Max: 98.1 °F (36.7 °C)   BP  Min: 104/61  Max: 119/52   Pulse  Min: 87  Max: 98   Resp  Min: 17  Max: 18   SpO2  Min: 91 %  Max: 96 %   Flow (L/min)  Min: 6  Max: 15   No data recorded        Ventilator/Non-Invasive Ventilation Settings (From admission, onward)      None                         Body mass index is 21.52 kg/m².  I/O last 3 completed shifts:  In: 840 [P.O.:840]  Out: 1200 [Urine:1200]  No intake/output data recorded.        Physical Exam:  General Appearance: Thin elderly white female looks worse than yesterday she is on 6 L nasal cannula sats are about 90 to 91%  Eyes: Conjunctiva are clear and anicteric pupils about 2.5 mm  ENT: Oral mucous membranes are dry no erythema no exudates, nasal septum midline  Neck: Trachea midline lying  Venous distention with her lying at about 15 degrees  Lungs: Coarse rales i and rhonchi bilaterally  Cardiac: Regular to tachycardic she is right that upper 90s low 100  range  Abdomen: Soft no palpable hepatosplenomegaly  : Not examined  Musculoskeletal: She has at least moderate thoracic kyphosis may be moderately severe.    Skin: Warm and dry no jaundice no petechiae   Neuro: He is pretty somnolent but will wake up and answer a few simple questions  Extremities/P Vascular: No clubbing no cyanosis no edema palpable radial and dorsalis pedis pulses  MSE: Very pleasant lady       Labs:  Results from last 7 days   Lab Units 07/17/23  0531 07/16/23  0425 07/15/23  0402 07/14/23  0825 07/13/23  1537 07/13/23  0450 07/12/23  1451 07/12/23  0424   GLUCOSE mg/dL 114* 108* 114* 107* 78 101*  --  99   SODIUM mmol/L 140 142 137 136 139 139  --  144   POTASSIUM mmol/L 3.9 3.9 4.0 4.1 4.3 5.0  --  4.0   MAGNESIUM mg/dL  --   --  2.2 2.2  --  2.2  --   --    CO2 mmol/L 30.6* 31.5* 27.0 27.0 25.5 30.0*  --  29.1*   CHLORIDE mmol/L 103 100 100 97* 99 98  --  102   ANION GAP mmol/L 6.4 10.5 10.0 12.0 14.5 11.0  --  12.9   CREATININE mg/dL 2.18* 2.32* 2.65* 3.35* 3.19* 2.86*  --  1.76*   BUN mg/dL 37* 39* 45* 49* 47* 46*  --  31*   BUN / CREAT RATIO  17.0 16.8 17.0 14.6 14.7 16.1  --  17.6   CALCIUM mg/dL 8.3* 8.9 8.4* 8.6 8.3* 8.8  --  9.2   ALK PHOS U/L  --  113  --  125*  --  131* 135*  --    TOTAL PROTEIN g/dL  --  6.3  --  6.2  --  6.5 6.8  --    ALT (SGPT) U/L  --  13  --  10  --  13 11  --    AST (SGOT) U/L  --  17  --  25  --  28 24  --    BILIRUBIN mg/dL  --  0.3  --  0.3  --  0.3 0.3  --    ALBUMIN g/dL  --  3.5 3.3* 3.4* 3.6 3.8 3.9  --    GLOBULIN gm/dL  --  2.8  --  2.8  --  2.7  --   --      Estimated Creatinine Clearance: 13.7 mL/min (A) (by C-G formula based on SCr of 2.18 mg/dL (H)).      Results from last 7 days   Lab Units 07/17/23  0531 07/16/23  0425 07/15/23  0402 07/14/23  0824 07/13/23  0450 07/12/23  0424   WBC 10*3/mm3 8.23 8.88 7.74 8.65 7.33 6.01   RBC 10*6/mm3 4.33 4.20 4.01 4.15 4.13 4.16   HEMOGLOBIN g/dL 10.6* 10.5* 9.9* 10.4* 10.3* 10.4*   HEMATOCRIT % 35.2 33.4*  32.3* 33.7* 34.2 33.6*   MCV fL 81.3 79.5 80.5 81.2 82.8 80.8   MCH pg 24.5* 25.0* 24.7* 25.1* 24.9* 25.0*   MCHC g/dL 30.1* 31.4* 30.7* 30.9* 30.1* 31.0*   RDW % 15.7* 15.4 15.5* 15.5* 15.5* 15.8*   RDW-SD fl 46.4 44.4 44.9 45.3 46.5 46.5   MPV fL 9.0 8.5 8.8 8.4 8.7 8.8   PLATELETS 10*3/mm3 243 226 217 193 214 187   NEUTROPHIL % % 62.4 60.2 64.3 65.3  --   --    LYMPHOCYTE % % 17.6* 19.4* 16.8* 19.9  --   --    MONOCYTES % % 14.6* 15.0* 14.6* 12.8*  --   --    EOSINOPHIL % % 4.6 4.7 3.5 1.5  --   --    BASOPHIL % % 0.6 0.5 0.4 0.3  --   --    IMM GRAN % % 0.2 0.2  --  0.2  --   --    NEUTROS ABS 10*3/mm3 5.13 5.35 4.98 5.64  --   --    LYMPHS ABS 10*3/mm3 1.45 1.72 1.30 1.72  --   --    MONOS ABS 10*3/mm3 1.20* 1.33* 1.13* 1.11*  --   --    EOS ABS 10*3/mm3 0.38 0.42* 0.27 0.13  --   --    BASOS ABS 10*3/mm3 0.05 0.04 0.03 0.03  --   --    IMMATURE GRANS (ABS) 10*3/mm3 0.02 0.02  --  0.02  --   --    NRBC /100 WBC 0.0 0.0  --  0.0  --   --          Results from last 7 days   Lab Units 07/12/23  0424 07/12/23  0012 07/11/23  2205   HSTROP T ng/L 44* 43* 35*               Results from last 7 days   Lab Units 07/13/23  1819   LACTATE mmol/L 1.1           Microbiology Results (last 10 days)       Procedure Component Value - Date/Time    Respiratory Panel PCR w/COVID-19(SARS-CoV-2) RICARDA/JACK/KRUNAL/PAD/COR/MAD/CANDICE In-House, NP Swab in UTM/VTM, 3-4 HR TAT - Swab, Nasopharynx [[id number]]  (Normal) Collected: 07/11/23 1239    Lab Status: Final result Specimen: Swab from Nasopharynx Updated: 07/11/23 1357     ADENOVIRUS, PCR Not Detected     Coronavirus 229E Not Detected     Coronavirus HKU1 Not Detected     Coronavirus NL63 Not Detected     Coronavirus OC43 Not Detected     COVID19 Not Detected     Human Metapneumovirus Not Detected     Human Rhinovirus/Enterovirus Not Detected     Influenza A PCR Not Detected     Influenza B PCR Not Detected     Parainfluenza Virus 1 Not Detected     Parainfluenza Virus 2 Not Detected      Parainfluenza Virus 3 Not Detected     Parainfluenza Virus 4 Not Detected     RSV, PCR Not Detected     Bordetella pertussis pcr Not Detected     Bordetella parapertussis PCR Not Detected     Chlamydophila pneumoniae PCR Not Detected     Mycoplasma pneumo by PCR Not Detected    Narrative:      In the setting of a positive respiratory panel with a viral infection PLUS a negative procalcitonin without other underlying concern for bacterial infection, consider observing off antibiotics or discontinuation of antibiotics and continue supportive care. If the respiratory panel is positive for atypical bacterial infection (Bordetella pertussis, Chlamydophila pneumoniae, or Mycoplasma pneumoniae), consider antibiotic de-escalation to target atypical bacterial infection.                pantoprazole, 40 mg, Oral, Q AM   Or  pantoprazole, 40 mg, Intravenous, Q AM  senna-docusate sodium, 2 tablet, Oral, BID             Diagnostics:  Adult Transthoracic Echo Complete W/ Cont if Necessary Per Protocol    Result Date: 7/14/2023    Left ventricular systolic function is moderately decreased. Left ventricular ejection fraction appears to be 36 - 40%.   The findings are consistent with dilated cardiomyopathy.   Left ventricular diastolic function was indeterminate.   Estimated right ventricular systolic pressure from tricuspid regurgitation is normal (<35 mmHg).     CT Cervical Spine Without Contrast    Result Date: 7/12/2023  CT CERVICAL SPINE WITHOUT CONTRAST  HISTORY: Fall, neck pain.  COMPARISON: CT cervical spine 04/05/2018  FINDINGS: There is a grade 1 anterolisthesis of C4 upon C5 estimated to be approximately 2 mm. There is moderate loss of disc height at C4-5 and C5-6. There is no evidence of prevertebral edema.  C2-3: A small central disc osteophyte complex is present with no evidence of herniation. Moderate facet degenerative disease is present on the right.  C3-4: Mild facet degenerative disease is present bilaterally.   C4-5: Moderate to severe facet degenerative disease is present on the left.  C5-6: A mild central disc osteophyte complex is present with no evidence of herniation. Moderate to severe foraminal stenosis is present on the right secondary to uncovertebral degenerative disease.  C6-7: There is no evidence of disc bulge or herniation.  C7-T1: There is no evidence of disc bulge or herniation.      Multilevel degenerative disease involving the cervical spine is noted as described above with no evidence of fracture. Further evaluation could be performed with a MRI examination of the cervical spine as indicated.    Radiation dose reduction techniques were utilized, including automated exposure control and exposure modulation based on body size.  This report was finalized on 7/12/2023 2:39 PM by Dr. Abram Ayala M.D.      XR Chest 1 View    Result Date: 7/13/2023  XR CHEST 1 VW-  HISTORY: Female who is 88 years-old,  respiratory failure  TECHNIQUE: Frontal view of the chest  COMPARISON: 07/11/2023  FINDINGS: The patient is rotated towards the right. The heart appears enlarged. Aorta is tortuous, calcified. Pulmonary vasculature is unremarkable. No focal pulmonary consolidation, pleural effusion, or pneumothorax. Chronic left humerus deformity is noted. No acute osseous process.      No focal pulmonary consolidation. Cardiomegaly. Tortuous aorta.  This report was finalized on 7/13/2023 5:47 PM by Dr. Amilcar Marquez M.D.      XR Chest 1 View    Result Date: 7/11/2023  XR CHEST 1 VW-  HISTORY: Female who is 88 years-old,  short of breath  TECHNIQUE: Frontal views of the chest  COMPARISON: None available  FINDINGS: The heart is enlarged. Aorta is tortuous. Mild prominence of interstitial markings. Minimal likely atelectasis at the bases. No pleural effusion, or pneumothorax. No acute osseous process.      Minimal likely atelectasis at the bases. Cardiomegaly with mild prominence of interstitial markings. Tortuous aorta.   This report was finalized on 7/11/2023 12:18 PM by Dr. Amilcar Marquez M.D.      US Abdomen Limited    Result Date: 7/13/2023  Examination: Renal sonogram  TECHNIQUE: Sonographic images of the kidneys and urinary bladder were obtained  HISTORY:Nausea  COMPARISON: 03/25/2016  FINDINGS: The pancreatic head and body are normal, with mildly dilated, 3 mm pancreatic duct. The visualized portions of the proximal IVC and aorta are normal. There is coarsened hepatic echotexture, without mass seen in visualized portions. The portal vein is patent, with antegrade flow. The common duct is dilated, measuring 1.2 cm, previously 0.4 cm. The right kidney is echogenic, without hydronephrosis, measuring approximately 6.5 cm with a simple appearing upper pole cyst measuring 4.5 cm. Additional simple appearing right renal cysts are seen.      1. Interval common duct dilation. Correlate with history and laboratory findings and ERCP or MRCP if clinically indicated 2. Prominent pancreatic duct. Correlate with the above 3. Coarsened hepatic echotexture, compatible with hepatocellular disease, without mass seen in visualized portions 4. Echogenic kidneys, compatible with renal parenchymal disease, with simple appearing cysts, not requiring follow-up.  This report was finalized on 7/13/2023 12:50 PM by Dr. Francisco Bhatt M.D.      Results for orders placed during the hospital encounter of 07/11/23    Adult Transthoracic Echo Complete W/ Cont if Necessary Per Protocol    Interpretation Summary    Left ventricular systolic function is moderately decreased. Left ventricular ejection fraction appears to be 36 - 40%.    The findings are consistent with dilated cardiomyopathy.    Left ventricular diastolic function was indeterminate.    Estimated right ventricular systolic pressure from tricuspid regurgitation is normal (<35 mmHg).      EKG today reviewed looks like a sinus with a left bundle block and frequent PVCs    Active Hospital Problems     Diagnosis  POA    DDD (degenerative disc disease), cervical [M50.30]  Yes    Cervical pain (neck) [M54.2]  Yes    CHF (congestive heart failure) [I50.9]  Yes      Resolved Hospital Problems    Diagnosis Date Resolved POA    Closed left hip fracture [S72.002A] 07/11/2023 Yes         Assessment & Plan     Acute hypoxic respiratory failure   Acute on chronic heart failure reduced ejection fraction   Cardiogenic shock blood pressure is adequate off of Levophed  Cardiac arrhythmias still looks to be in sinus rhythm today  Acute kidney injury on chronic kidney disease stage IV   Coronary artery disease  Ischemic cardiomyopathy LVEF 36 to 40%  Transient altered mental status yesterday I think really corresponded to hypoperfusion she is normal today.  Protein calorie malnutrition  Anemia her iron saturation was a little low but total iron was normal probably predominantly anemia of chronic disease there may be some mild iron deficiency component   severe thoracic kyphoscoliosis  Common bile duct dilatation, history of pancreatic stent and some coarsening of echogenic texture of the liver  DNR/DNI    Plan for disposition: Continue palliative care hospice is to evaluate    Derek Olivier Jr, MD  07/18/23  16:08 EDT    Time:

## 2023-07-18 NOTE — PLAN OF CARE
Problem: Fall Injury Risk  Goal: Absence of Fall and Fall-Related Injury  Outcome: Ongoing, Progressing     Problem: Skin Injury Risk Increased  Goal: Skin Health and Integrity  Outcome: Ongoing, Progressing     Problem: Pain Acute  Goal: Acceptable Pain Control and Functional Ability  Outcome: Ongoing, Progressing     Problem: Palliative Care  Goal: Enhanced Quality of Life  Outcome: Ongoing, Progressing   Goal Outcome Evaluation:           Progress: no change  Outcome Evaluation: Pt transferred to 4 park, alert and oriented x4, forgetful, pain/nausea/itching managed with oral/iv meds, repositioned, son at bedside, voiding per mary kate, meds whole with water.

## 2023-07-18 NOTE — PROGRESS NOTES
The patient was transition to palliative care, there is nothing else to add from the renal standpoint I will sign off.

## 2023-07-19 NOTE — PROGRESS NOTES
Case Management Discharge Note      Final Note: The patient  on 23 @ 04:40. NELSON Black RN ,CCP.         Selected Continued Care - Discharged on 2023 Admission date: 2023 - Discharge disposition:       Destination    No services have been selected for the patient.                Durable Medical Equipment    No services have been selected for the patient.                Dialysis/Infusion    No services have been selected for the patient.                Home Medical Care    No services have been selected for the patient.                Therapy    No services have been selected for the patient.                Community Resources    No services have been selected for the patient.                Community & DME    No services have been selected for the patient.                         Final Discharge Disposition Code: 20 -

## 2023-07-19 NOTE — PROGRESS NOTES
Name: Marilynn Gil ADMIT: 2023   : 1935  PCP: Pam Charles APRN    MRN: [id number] LOS: 7 days   AGE/SEX: 88 y.o. female  ROOM: [facility]/     Subjective   Subjective   Transferred out of the ICU to palliative care today.  Chart reviewed.  CHARLY is resuming the care as she has been treated transferred to palliative care..  Patient is unresponsive at this time and most of the history is from the family who is surrounding the patient.  Patient with shallow breathing and vomiting x1 today.  She has been unresponsive for several hours now.  Bro cath insertion revealed at 1000 mL of urine.       Objective   Objective   Vital Signs  Temp:  [96.9 °F (36.1 °C)-98.1 °F (36.7 °C)] 98.1 °F (36.7 °C)  Heart Rate:  [90-98] 98  Resp:  [17-18] 17  BP: (109)/(63) 109/63  SpO2:  [91 %-96 %] 91 %  on  Flow (L/min):  [6-10] 6;   Device (Oxygen Therapy): humidified;high-flow nasal cannula    Intake/Output Summary (Last 24 hours) at 2023 2103  Last data filed at 2023 1725  Gross per 24 hour   Intake 240 ml   Output 1100 ml   Net -860 ml     Body mass index is 21.52 kg/m².      07/15/23  0600 23  0530 23  0532   Weight: 47.8 kg (105 lb 6.1 oz) 47.9 kg (105 lb 9.6 oz) 46.7 kg (102 lb 15.3 oz)     Physical Exam  General.  Elderly female.  She is deeply somnolent and not arousable.  Positive shallow breathing.  Eyes.  Pupils sluggish.  No pallor or jaundice.  Oral cavity.  Mildly dry mucous membrane.  Neck.  Supple.  No JVD.  No lymphadenopathy or thyromegaly.  Cardiovascular.  Regular rate and rhythm and grade 2 systolic murmur.  Chest.  Poor bilateral air entry with bilateral rhonchi and bibasilar crackles.  Abdomen.  Soft lax.  No tenderness.  No organomegaly.  No guarding or rebound.  Extremities.  Trace bilateral lower extremity edema.  CNS.  Unresponsive.  Difficult to examine.      Results Review:      Results from last  days   Lab Units 23  0531 23  0425 07/15/23  0402  07/14/23  0825 07/13/23  1537 07/13/23  0450 07/12/23  1451 07/12/23  0424   SODIUM mmol/L 140 142 137 136 139 139  --  144   POTASSIUM mmol/L 3.9 3.9 4.0 4.1 4.3 5.0  --  4.0   CHLORIDE mmol/L 103 100 100 97* 99 98  --  102   CO2 mmol/L 30.6* 31.5* 27.0 27.0 25.5 30.0*  --  29.1*   BUN mg/dL 37* 39* 45* 49* 47* 46*  --  31*   CREATININE mg/dL 2.18* 2.32* 2.65* 3.35* 3.19* 2.86*  --  1.76*   GLUCOSE mg/dL 114* 108* 114* 107* 78 101*  --  99   CALCIUM mg/dL 8.3* 8.9 8.4* 8.6 8.3* 8.8  --  9.2   AST (SGOT) U/L  --  17  --  25  --  28 24  --    ALT (SGPT) U/L  --  13  --  10  --  13 11  --      Estimated Creatinine Clearance: 13.7 mL/min (A) (by C-G formula based on SCr of 2.18 mg/dL (H)).      Results from last 7 days   Lab Units 07/15/23  1242 07/15/23  0726 07/15/23  0412 07/14/23  2017 07/14/23  1530 07/14/23  1144 07/14/23  0739 07/14/23  0559   GLUCOSE mg/dL 111 95 111 129 144* 121 106 123     Results from last 7 days   Lab Units 07/12/23  0424 07/12/23  0012 07/11/23  2205   HSTROP T ng/L 44* 43* 35*             Results from last 7 days   Lab Units 07/15/23  0402 07/14/23  0825 07/13/23  0450   MAGNESIUM mg/dL 2.2 2.2 2.2           Invalid input(s): LDLCALC  Results from last 7 days   Lab Units 07/17/23  0531 07/16/23  0425 07/15/23  0402 07/14/23  0824 07/13/23  0450 07/13/23  0450 07/12/23  0424   WBC 10*3/mm3 8.23 8.88 7.74 8.65  --  7.33 6.01   HEMOGLOBIN g/dL 10.6* 10.5* 9.9* 10.4*  --  10.3* 10.4*   HEMATOCRIT % 35.2 33.4* 32.3* 33.7*  --  34.2 33.6*   PLATELETS 10*3/mm3 243 226 217 193  --  214 187   MCV fL 81.3 79.5 80.5 81.2  --  82.8 80.8   MCH pg 24.5* 25.0* 24.7* 25.1*  --  24.9* 25.0*   MCHC g/dL 30.1* 31.4* 30.7* 30.9*  --  30.1* 31.0*   RDW % 15.7* 15.4 15.5* 15.5*  --  15.5* 15.8*   RDW-SD fl 46.4 44.4 44.9 45.3  --  46.5 46.5   MPV fL 9.0 8.5 8.8 8.4  --  8.7 8.8   NEUTROPHIL % % 62.4 60.2 64.3 65.3   < >  --   --    LYMPHOCYTE % % 17.6* 19.4* 16.8* 19.9   < >  --   --    MONOCYTES % % 14.6*  15.0* 14.6* 12.8*   < >  --   --    EOSINOPHIL % % 4.6 4.7 3.5 1.5   < >  --   --    BASOPHIL % % 0.6 0.5 0.4 0.3   < >  --   --    IMM GRAN % % 0.2 0.2  --  0.2   < >  --   --    NEUTROS ABS 10*3/mm3 5.13 5.35 4.98 5.64   < >  --   --    LYMPHS ABS 10*3/mm3 1.45 1.72 1.30 1.72   < >  --   --    MONOS ABS 10*3/mm3 1.20* 1.33* 1.13* 1.11*   < >  --   --    EOS ABS 10*3/mm3 0.38 0.42* 0.27 0.13   < >  --   --    BASOS ABS 10*3/mm3 0.05 0.04 0.03 0.03   < >  --   --    IMMATURE GRANS (ABS) 10*3/mm3 0.02 0.02  --  0.02   < >  --   --    NRBC /100 WBC 0.0 0.0  --  0.0   < >  --   --     < > = values in this interval not displayed.             Results from last 7 days   Lab Units 07/13/23  1819   LACTATE mmol/L 1.1         Results from last 7 days   Lab Units 07/12/23  1451   LIPASE U/L 26             Results from last 7 days   Lab Units 07/12/23  1817   NITRITE UA  Negative     Results from last 7 days   Lab Units 07/15/23  0402 07/14/23  0825 07/14/23  0355   SODIUM UR mmol/L  --   --  56   CREATININE UR mg/dL  --   --  78.5   CHLORIDE UR mmol/L  --   --  38   PROTEIN TOTAL URINE mg/dL  --   --  7.8   URIC ACID mg/dL 9.5*   < >  --     < > = values in this interval not displayed.       Imaging:  Imaging Results (Last 24 Hours)       ** No results found for the last 24 hours. **               I reviewed the patient's new clinical results / labs / tests / procedures      Assessment/Plan     Active Hospital Problems    Diagnosis  POA    DDD (degenerative disc disease), cervical [M50.30]  Yes    Cervical pain (neck) [M54.2]  Yes    CHF (congestive heart failure) [I50.9]  Yes      Resolved Hospital Problems    Diagnosis Date Resolved POA    Closed left hip fracture [S72.002A] 07/11/2023 Yes           Acute hypoxemic respiratory failure secondary to acute on chronic systolic congestive heart failure associated with cardiogenic shock in a patient with a history of coronary artery disease and ischemic cardiomyopathy..  S/p IV  pressors.  Ejection fraction is 36 to 40%.  Patient with failure of medical treatment.  Patient and family wanted symptomatic control and comfort measures and the patient is currently in palliative care for symptomatic treatment.  Unable to tolerate p.o.  No blood test is planned.  Anemia of chronic disease.  Severe kyphoscoliosis.  Acute on top of stage IV renal failure/urine retention.  Appears mildly volume overloaded.  Acute is secondary to the heart failure.  Continue Bro.  Nephrology signed off.  DNR status and goals of treatment..  Patient and family wishes patient requested that she has comfort measures only and DNR was confirmed with the patient and the family.  VTE prophylaxis.  Not indicated as the patient is palliative.      Discussed my findings and plan of treatment with the patient's family.  Disposition.  Hospice to evaluate.  I think she qualify for scattered bed.      Collins Blevins MD  [facility]  07/18/23  21:03 EDT

## 2023-07-21 NOTE — DISCHARGE SUMMARY
Patient Name: Marilynn Gil  : 1935  MRN: [id number]    Date of Admission: 2023  Date of Discharge/death: 2023 at 0 440  Primary Care Physician: Pam Charles APRN  Primary cause of death.  Acute hypoxemic respiratory failure secondary to acute on chronic systolic congestive heart failure leading to cardiogenic shock  Secondary causes of death coronary artery disease/ischemic cardiomyopathy.  Acute kidney failure  Other diagnoses.  Anemia of chronic disease/severe kyphoscoliosis/stage IV chronic renal failure/urine retention  Discharge Diagnoses     Active Hospital Problems    Diagnosis  POA    DDD (degenerative disc disease), cervical [M50.30]  Yes    Cervical pain (neck) [M54.2]  Yes    CHF (congestive heart failure) [I50.9]  Yes      Resolved Hospital Problems    Diagnosis Date Resolved POA    Closed left hip fracture [S72.002A] 2023 Yes        Hospital Course     Brief admission history and physical.  A pleasant 88 years old white female with a past history of coronary artery disease/stage IV chronic renal failure/CVA/hypertension/ischemic cardiomyopathy/peripheral vascular disease/pancreatitis/type 2 diabetes who presents to the emergency department with neck and shoulder pain associated with shortness of breath and hypoxemia.  Physical examination admission included an afebrile patient with stable vital signs.  Chronically ill-appearing female.  Poor bilateral air entry   Hospital course.  Initial ER evaluation included a CBC that was normal except a hemoglobin of 10.4.  Procalcitonin was normal.  High-sensitivity troponin elevated at 40.  proBNP was elevated at 3635.  INR was normal.  CMP normal except a glucose of 108, BUN 28, creatinine 1.86, alkaline phosphatase 136, GFR 26.1.  Respiratory PCR panel was negative.  Chest x-ray with atelectasis at both lung bases with cardiomegaly and increased interstitial marking.  Patient was admitted to the hospital with acute hypoxemic  respiratory failure secondary to acute on chronic systolic congestive heart failure exacerbation.  Soon after admission she developed hypotension indicative of cardiogenic shock.  She does have a history of coronary artery disease and ischemic cardiomyopathy and ejection fraction of 36 to 40% by echo.  She was transferred to the ICU and started on IV pressors.  Her medical condition did not improve and the patient and the family decided on symptomatic and palliative treatment she was transferred to palliative care.  Palliative order set was done.  Her anemia of chronic disease remained stable.  Her severe kyphoscoliosis has contributed a bit to her respiratory failure.  She was noted to have an acute on top of stage IV chronic renal failure and developed urinary retention.  She remained volume overloaded despite diuresis and acute component of the renal failure is thought to be secondary to cardiorenal syndrome.  Nephrology saw the patient during her acute facility stay.  DNR status and goals of treatment were discussed with the patient and the family who requested a DO NOT RESUSCITATE and comfort measures only particularly that she has failed medical treatment.  The patient  at the above-mentioned date and time because of the above reasons.      Consultants     Consult Orders (all) (From admission, onward)       Start     Ordered    23 1647  Inpatient Hospitalist Consult  Once,   Status:  Canceled        Specialty:  Hospitalist  Provider:  Collins Blevins MD    23 1646    23 1400  Inpatient Hospice Consult  Once        Specialty:  Hospice and Palliative Medicine  Provider:  (Not yet assigned)    23 1401    23 1354  Inpatient Palliative Care Team Consult  Once        Comments: Patient is refusing diuretics, intermittently forgetful, but is Aox4   Provider:  (Not yet assigned)    23 1354    23 1348  Inpatient Pulmonology Consult  STAT        Specialty:  Pulmonary  Disease  Provider:  Eugenio Dailey MD    07/13/23 1348    07/13/23 1116  Inpatient Nephrology Consult  Once        Specialty:  Nephrology  Provider:  Jose Torres MD    07/13/23 1116    07/12/23 0000  Inpatient Case Management  Consult  Once        Provider:  (Not yet assigned)    07/11/23 1539    07/11/23 1814  Inpatient Cardiology Consult  Once        Specialty:  Cardiology  Provider:  Dick Camara MD    07/11/23 1813    07/11/23 1757  Inpatient Cardiology Consult  Once,   Status:  Canceled        Specialty:  Cardiology  Provider:  Dick Camara MD    07/11/23 1758    07/11/23 1757  Inpatient Neurosurgery Consult  Once        Specialty:  Neurosurgery  Provider:  Derrick Driver MD    07/11/23 1758    07/11/23 1540  Inpatient Orthopedic Surgery Consult  Once,   Status:  Canceled        Specialty:  Orthopedic Surgery  Provider:  Adrian Sweeney MD    07/11/23 1539                  Procedures     Imaging Results (All)       Procedure Component Value Units Date/Time    XR Chest 1 View [[id number]] Collected: 07/15/23 0943     Updated: 07/15/23 0954    Narrative:      XR CHEST 1 VW-     HISTORY: Female who is 88 years-old,  hypoxia     TECHNIQUE: Frontal view of the chest     COMPARISON: 07/13/2023     FINDINGS: The heart is enlarged. Aorta is tortuous, calcified. Pulmonary  vasculature is unremarkable. Minimal peripheral right basilar  atelectasis or infiltrate. No large pleural effusion, or pneumothorax.  No acute osseous process.       Impression:      Minimal peripheral right basilar atelectasis or infiltrate.  Cardiomegaly. Tortuous aorta.     This report was finalized on 7/15/2023 9:50 AM by Dr. Amilcar Marquez M.D.       XR Chest 1 View [[id number]] Collected: 07/13/23 1745     Updated: 07/13/23 1750    Narrative:      XR CHEST 1 VW-     HISTORY: Female who is 88 years-old,  respiratory failure     TECHNIQUE: Frontal view of the chest     COMPARISON:  07/11/2023     FINDINGS: The patient is rotated towards the right. The heart appears  enlarged. Aorta is tortuous, calcified. Pulmonary vasculature is  unremarkable. No focal pulmonary consolidation, pleural effusion, or  pneumothorax. Chronic left humerus deformity is noted. No acute osseous  process.       Impression:      No focal pulmonary consolidation. Cardiomegaly. Tortuous  aorta.     This report was finalized on 7/13/2023 5:47 PM by Dr. Amilcar Marquez M.D.       US Abdomen Limited [[id number]] Collected: 07/13/23 1248     Updated: 07/13/23 1253    Narrative:      Examination: Renal sonogram     TECHNIQUE: Sonographic images of the kidneys and urinary bladder were  obtained     HISTORY:Nausea     COMPARISON: 03/25/2016     FINDINGS: The pancreatic head and body are normal, with mildly dilated,  3 mm pancreatic duct. The visualized portions of the proximal IVC and  aorta are normal. There is coarsened hepatic echotexture, without mass  seen in visualized portions. The portal vein is patent, with antegrade  flow. The common duct is dilated, measuring 1.2 cm, previously 0.4 cm.  The right kidney is echogenic, without hydronephrosis, measuring  approximately 6.5 cm with a simple appearing upper pole cyst measuring  4.5 cm. Additional simple appearing right renal cysts are seen.       Impression:      1. Interval common duct dilation. Correlate with history and laboratory  findings and ERCP or MRCP if clinically indicated  2. Prominent pancreatic duct. Correlate with the above  3. Coarsened hepatic echotexture, compatible with hepatocellular  disease, without mass seen in visualized portions  4. Echogenic kidneys, compatible with renal parenchymal disease, with  simple appearing cysts, not requiring follow-up.     This report was finalized on 7/13/2023 12:50 PM by Dr. Francisco Bhatt M.D.       CT Cervical Spine Without Contrast [[id number]] Collected: 07/12/23 1044     Updated: 07/12/23 1442    Narrative:       CT CERVICAL SPINE WITHOUT CONTRAST     HISTORY: Fall, neck pain.     COMPARISON: CT cervical spine 04/05/2018     FINDINGS: There is a grade 1 anterolisthesis of C4 upon C5 estimated to  be approximately 2 mm. There is moderate loss of disc height at C4-5 and  C5-6. There is no evidence of prevertebral edema.     C2-3: A small central disc osteophyte complex is present with no  evidence of herniation. Moderate facet degenerative disease is present  on the right.     C3-4: Mild facet degenerative disease is present bilaterally.     C4-5: Moderate to severe facet degenerative disease is present on the  left.     C5-6: A mild central disc osteophyte complex is present with no evidence  of herniation. Moderate to severe foraminal stenosis is present on the  right secondary to uncovertebral degenerative disease.     C6-7: There is no evidence of disc bulge or herniation.     C7-T1: There is no evidence of disc bulge or herniation.       Impression:      Multilevel degenerative disease involving the cervical spine  is noted as described above with no evidence of fracture. Further  evaluation could be performed with a MRI examination of the cervical  spine as indicated.           Radiation dose reduction techniques were utilized, including automated  exposure control and exposure modulation based on body size.     This report was finalized on 7/12/2023 2:39 PM by Dr. Abram Ayala M.D.       CT Outside Films [[id number]] Resulted: 07/12/23 0912     Updated: 07/12/23 0913    Narrative:      This procedure was auto-finalized with no dictation required.    CT Outside Films [[id number]] Resulted: 07/12/23 0909     Updated: 07/12/23 0910    Narrative:      This procedure was auto-finalized with no dictation required.    XR Chest 1 View [[id number]] Collected: 07/11/23 1217     Updated: 07/11/23 1223    Narrative:      XR CHEST 1 VW-     HISTORY: Female who is 88 years-old,  short of breath     TECHNIQUE: Frontal views of the  chest     COMPARISON: None available     FINDINGS: The heart is enlarged. Aorta is tortuous. Mild prominence of  interstitial markings. Minimal likely atelectasis at the bases. No  pleural effusion, or pneumothorax. No acute osseous process.       Impression:      Minimal likely atelectasis at the bases. Cardiomegaly with  mild prominence of interstitial markings. Tortuous aorta.     This report was finalized on 7/11/2023 12:18 PM by Dr. Amilcar Marquez M.D.               Pertinent Labs     Results from last 7 days   Lab Units 07/17/23  0531 07/16/23  0425 07/15/23  0402   WBC 10*3/mm3 8.23 8.88 7.74   HEMOGLOBIN g/dL 10.6* 10.5* 9.9*   PLATELETS 10*3/mm3 243 226 217     Results from last 7 days   Lab Units 07/17/23  0531 07/16/23  0425 07/15/23  0402   SODIUM mmol/L 140 142 137   POTASSIUM mmol/L 3.9 3.9 4.0   CHLORIDE mmol/L 103 100 100   CO2 mmol/L 30.6* 31.5* 27.0   BUN mg/dL 37* 39* 45*   CREATININE mg/dL 2.18* 2.32* 2.65*   GLUCOSE mg/dL 114* 108* 114*   Estimated Creatinine Clearance: 13.7 mL/min (A) (by C-G formula based on SCr of 2.18 mg/dL (H)).  Results from last 7 days   Lab Units 07/16/23  0425 07/15/23  0402   ALBUMIN g/dL 3.5 3.3*   BILIRUBIN mg/dL 0.3  --    ALK PHOS U/L 113  --    AST (SGOT) U/L 17  --    ALT (SGPT) U/L 13  --      Results from last 7 days   Lab Units 07/17/23 0531 07/16/23 0425 07/15/23  0402   CALCIUM mg/dL 8.3* 8.9 8.4*   ALBUMIN g/dL  --  3.5 3.3*   MAGNESIUM mg/dL  --   --  2.2   PHOSPHORUS mg/dL  --   --  3.7         Results from last 7 days   Lab Units 07/15/23  0402   URIC ACID mg/dL 9.5*         Invalid input(s): LDLCALC      Imaging Results (Last 24 Hours)       ** No results found for the last 24 hours. **            Test Results Pending at Discharge         Discharge Exam   Physical Exam  Last physical examination on this patient was done on 7/18/2023 at 2111 and is as follows  Temp:  [96.9 °F (36.1 °C)-98.1 °F (36.7 °C)] 98.1 °F (36.7 °C)  Heart Rate:  [90-98]  98  Resp:  [17-18] 17  BP: (109)/(63) 109/63  SpO2:  [91 %-96 %] 91 %  on  Flow (L/min):  [6-10] 6;   Device (Oxygen Therapy): humidified;high-flow nasal cannula     Intake/Output Summary (Last 24 hours) at 7/18/2023 2103  Last data filed at 7/18/2023 1725      Gross per 24 hour   Intake 240 ml   Output 1100 ml   Net -860 ml      Body mass index is 21.52 kg/m².  Vitals               07/15/23  0600 07/16/23  0530 07/17/23  0532   Weight: 47.8 kg (105 lb 6.1 oz) 47.9 kg (105 lb 9.6 oz) 46.7 kg (102 lb 15.3 oz)         Physical Exam  General.  Elderly female.  She is deeply somnolent and not arousable.  Positive shallow breathing.  Eyes.  Pupils sluggish.  No pallor or jaundice.  Oral cavity.  Mildly dry mucous membrane.  Neck.  Supple.  No JVD.  No lymphadenopathy or thyromegaly.  Cardiovascular.  Regular rate and rhythm and grade 2 systolic murmur.  Chest.  Poor bilateral air entry with bilateral rhonchi and bibasilar crackles.  Abdomen.  Soft lax.  No tenderness.  No organomegaly.  No guarding or rebound.  Extremities.  Trace bilateral lower extremity edema.  CNS.  Unresponsive.  Difficult to examine.  Discharge Details     CODE STATUS:    Code Status and Medical Interventions:   Ordered at: 07/17/23 1401     Level Of Support Discussed With:    Patient     Code Status (Patient has no pulse and is not breathing):    No CPR (Do Not Attempt to Resuscitate)     Medical Interventions (Patient has pulse or is breathing):    Comfort Measures       No future appointments.          Time Spent on Discharge:  Greater than 30 minutes      Collins Blevins MD  [facility]  07/21/23  18:52 EDT

## 2023-07-25 NOTE — PROGRESS NOTES
"Enter Query Response Below      Query Response:   Patient had a encephalopathy probably secondary to hypoperfusion related to her cardiac arrhythmia           If applicable, please update the problem list.     Patient: Marilynn Gil        : 1935  Account: [id number]           Admit Date: 2023        How to Respond to this query:       a. Click New Note     b. Answer query within the yellow box.                c. Update the Problem List, if applicable.      If you have any questions about this query contact me at: [email or web address]     Dr. Olivier,    88 yr. old female presented to hospital with neck and shoulder pain and shortness of breath was found to be in respiratory failure and CHF and hypotensive after diuresis. Your progress note on 7/15 documents \"Altered mental status question did she have a seizure or was she just hypo perfused or both with her arrhythmia. She was out of it for a few minutes after we got a better rhythm and pulse/blood pressure but seems to be awake and responding appropriately now.\" Nephrology progress notes document \"Acute encephalopathy -? Seizure related to SVT episode; mentation better currently.\" The patient was treated with Dobutamine stopped, Lopressor IV and bag mask ventilated [id number].     Please clarify if patient treated/monitored for one or more of the following:    - Metabolic encephalopathy  - Altered mental status only  - Other- specify__________  - Unable to determine     By submitting this query, we are merely seeking further clarification of documentation to accurately reflect all conditions that you are monitoring, evaluating, treating or that extend the hospitalization or utilize additional resources of care. Please utilize your independent clinical judgment when addressing the question(s) above.     This query and your response, once completed, will be entered into the legal medical record.    Sincerely,  Janneth Barillas RN  Clinical " Documentation Integrity Program

## 2024-11-17 NOTE — HOME HEALTH
Subjective:     Falls reported: none    Medication changes: none      Plan for next visit: PAST MEDICAL HISTORY:  No pertinent past medical history